# Patient Record
Sex: FEMALE | Race: WHITE | NOT HISPANIC OR LATINO | Employment: FULL TIME | URBAN - METROPOLITAN AREA
[De-identification: names, ages, dates, MRNs, and addresses within clinical notes are randomized per-mention and may not be internally consistent; named-entity substitution may affect disease eponyms.]

---

## 2020-10-27 ENCOUNTER — OFFICE VISIT (OUTPATIENT)
Dept: FAMILY MEDICINE | Facility: CLINIC | Age: 30
End: 2020-10-27
Payer: COMMERCIAL

## 2020-10-27 VITALS
TEMPERATURE: 99.2 F | SYSTOLIC BLOOD PRESSURE: 154 MMHG | DIASTOLIC BLOOD PRESSURE: 106 MMHG | RESPIRATION RATE: 20 BRPM | BODY MASS INDEX: 44.41 KG/M2 | OXYGEN SATURATION: 98 % | HEART RATE: 121 BPM | WEIGHT: 293 LBS | HEIGHT: 68 IN

## 2020-10-27 DIAGNOSIS — F64.9 GENDER DYSPHORIA: Primary | ICD-10-CM

## 2020-10-27 DIAGNOSIS — R03.0 ELEVATED BLOOD PRESSURE READING WITHOUT DIAGNOSIS OF HYPERTENSION: ICD-10-CM

## 2020-10-27 DIAGNOSIS — Z83.3 FAMILY HISTORY OF DIABETES MELLITUS: ICD-10-CM

## 2020-10-27 LAB
ALBUMIN SERPL-MCNC: 3.6 G/DL (ref 3.4–5)
ALP SERPL-CCNC: 57 U/L (ref 40–150)
ALT SERPL W P-5'-P-CCNC: 58 U/L (ref 0–70)
ANION GAP SERPL CALCULATED.3IONS-SCNC: 5 MMOL/L (ref 3–14)
AST SERPL W P-5'-P-CCNC: 32 U/L (ref 0–45)
BASOPHILS # BLD AUTO: 0.1 10E9/L (ref 0–0.2)
BASOPHILS NFR BLD AUTO: 0.8 %
BILIRUB SERPL-MCNC: 0.5 MG/DL (ref 0.2–1.3)
BUN SERPL-MCNC: 9 MG/DL (ref 7–30)
CALCIUM SERPL-MCNC: 9.3 MG/DL (ref 8.5–10.1)
CHLORIDE SERPL-SCNC: 98 MMOL/L (ref 94–109)
CHOLEST SERPL-MCNC: 226 MG/DL
CO2 SERPL-SCNC: 29 MMOL/L (ref 20–32)
CREAT SERPL-MCNC: 0.71 MG/DL (ref 0.66–1.25)
DIFFERENTIAL METHOD BLD: ABNORMAL
EOSINOPHIL # BLD AUTO: 0.1 10E9/L (ref 0–0.7)
EOSINOPHIL NFR BLD AUTO: 2 %
ERYTHROCYTE [DISTWIDTH] IN BLOOD BY AUTOMATED COUNT: 13.3 % (ref 10–15)
GFR SERPL CREATININE-BSD FRML MDRD: >90 ML/MIN/{1.73_M2}
GLUCOSE SERPL-MCNC: 356 MG/DL (ref 70–99)
HBA1C MFR BLD: 10.5 % (ref 0–5.6)
HCT VFR BLD AUTO: 50.3 % (ref 40–53)
HDLC SERPL-MCNC: 21 MG/DL
HGB BLD-MCNC: 16.8 G/DL (ref 13.3–17.7)
IMM GRANULOCYTES # BLD: 0 10E9/L (ref 0–0.4)
IMM GRANULOCYTES NFR BLD: 0.3 %
LDLC SERPL CALC-MCNC: ABNORMAL MG/DL
LDLC SERPL DIRECT ASSAY-MCNC: 66 MG/DL
LYMPHOCYTES # BLD AUTO: 2 10E9/L (ref 0.8–5.3)
LYMPHOCYTES NFR BLD AUTO: 30.6 %
MCH RBC QN AUTO: 27.5 PG (ref 26.5–33)
MCHC RBC AUTO-ENTMCNC: 33.4 G/DL (ref 31.5–36.5)
MCV RBC AUTO: 83 FL (ref 78–100)
MONOCYTES # BLD AUTO: 0.4 10E9/L (ref 0–1.3)
MONOCYTES NFR BLD AUTO: 5.7 %
NEUTROPHILS # BLD AUTO: 4 10E9/L (ref 1.6–8.3)
NEUTROPHILS NFR BLD AUTO: 60.6 %
NONHDLC SERPL-MCNC: 205 MG/DL
NRBC # BLD AUTO: 0 10*3/UL
NRBC BLD AUTO-RTO: 0 /100
PLATELET # BLD AUTO: 256 10E9/L (ref 150–450)
POTASSIUM SERPL-SCNC: 3.8 MMOL/L (ref 3.4–5.3)
PROT SERPL-MCNC: 8.3 G/DL (ref 6.8–8.8)
RBC # BLD AUTO: 6.1 10E12/L (ref 4.4–5.9)
SODIUM SERPL-SCNC: 131 MMOL/L (ref 133–144)
TRIGL SERPL-MCNC: 1750 MG/DL
WBC # BLD AUTO: 6.6 10E9/L (ref 4–11)

## 2020-10-27 PROCEDURE — 85025 COMPLETE CBC W/AUTO DIFF WBC: CPT | Performed by: FAMILY MEDICINE

## 2020-10-27 PROCEDURE — 36415 COLL VENOUS BLD VENIPUNCTURE: CPT | Performed by: FAMILY MEDICINE

## 2020-10-27 PROCEDURE — 83036 HEMOGLOBIN GLYCOSYLATED A1C: CPT | Performed by: FAMILY MEDICINE

## 2020-10-27 PROCEDURE — 80061 LIPID PANEL: CPT | Performed by: FAMILY MEDICINE

## 2020-10-27 PROCEDURE — 99204 OFFICE O/P NEW MOD 45 MIN: CPT | Performed by: FAMILY MEDICINE

## 2020-10-27 PROCEDURE — 83721 ASSAY OF BLOOD LIPOPROTEIN: CPT | Mod: 59 | Performed by: FAMILY MEDICINE

## 2020-10-27 PROCEDURE — 80053 COMPREHEN METABOLIC PANEL: CPT | Performed by: FAMILY MEDICINE

## 2020-10-27 ASSESSMENT — PATIENT HEALTH QUESTIONNAIRE - PHQ9
SUM OF ALL RESPONSES TO PHQ QUESTIONS 1-9: 15
5. POOR APPETITE OR OVEREATING: NEARLY EVERY DAY

## 2020-10-27 ASSESSMENT — ANXIETY QUESTIONNAIRES
6. BECOMING EASILY ANNOYED OR IRRITABLE: SEVERAL DAYS
IF YOU CHECKED OFF ANY PROBLEMS ON THIS QUESTIONNAIRE, HOW DIFFICULT HAVE THESE PROBLEMS MADE IT FOR YOU TO DO YOUR WORK, TAKE CARE OF THINGS AT HOME, OR GET ALONG WITH OTHER PEOPLE: SOMEWHAT DIFFICULT
5. BEING SO RESTLESS THAT IT IS HARD TO SIT STILL: SEVERAL DAYS
2. NOT BEING ABLE TO STOP OR CONTROL WORRYING: MORE THAN HALF THE DAYS
GAD7 TOTAL SCORE: 13
7. FEELING AFRAID AS IF SOMETHING AWFUL MIGHT HAPPEN: MORE THAN HALF THE DAYS
3. WORRYING TOO MUCH ABOUT DIFFERENT THINGS: MORE THAN HALF THE DAYS
1. FEELING NERVOUS, ANXIOUS, OR ON EDGE: MORE THAN HALF THE DAYS

## 2020-10-27 ASSESSMENT — MIFFLIN-ST. JEOR: SCORE: 2396.32

## 2020-10-27 NOTE — Clinical Note
Patient seeing you to discuss feminizing hormone therapy. BP high (she was super nervous so if still high next visit would recommend treating). I put in a referral for laser hair in case they do it at the  (she might need to call her insurance to see preferred location for laser hair and you might need to put in a referral for that specific place). She would benefit from a PCP so hopefully that can be you! She's got a lot of medical anxiety.  Best, Franca Sterling

## 2020-10-27 NOTE — PATIENT INSTRUCTIONS
After visit summary    1. Labs today  2. Review informed consent for feminizing hormone therapy  3. Schedule a 40 minute follow up to discuss informed consent and hormone types and dosing    Franca Saeed

## 2020-10-28 ASSESSMENT — ANXIETY QUESTIONNAIRES: GAD7 TOTAL SCORE: 13

## 2020-10-29 ENCOUNTER — OFFICE VISIT (OUTPATIENT)
Dept: PHARMACY | Facility: CLINIC | Age: 30
End: 2020-10-29
Payer: COMMERCIAL

## 2020-10-29 ENCOUNTER — OFFICE VISIT (OUTPATIENT)
Dept: FAMILY MEDICINE | Facility: CLINIC | Age: 30
End: 2020-10-29
Payer: COMMERCIAL

## 2020-10-29 VITALS
BODY MASS INDEX: 43.4 KG/M2 | WEIGHT: 293 LBS | HEART RATE: 108 BPM | TEMPERATURE: 99.1 F | DIASTOLIC BLOOD PRESSURE: 105 MMHG | HEIGHT: 69 IN | OXYGEN SATURATION: 96 % | SYSTOLIC BLOOD PRESSURE: 163 MMHG

## 2020-10-29 VITALS
SYSTOLIC BLOOD PRESSURE: 163 MMHG | TEMPERATURE: 99.1 F | HEART RATE: 108 BPM | WEIGHT: 293 LBS | HEIGHT: 69 IN | OXYGEN SATURATION: 96 % | DIASTOLIC BLOOD PRESSURE: 105 MMHG | BODY MASS INDEX: 43.4 KG/M2

## 2020-10-29 DIAGNOSIS — Z79.4 TYPE 2 DIABETES MELLITUS WITHOUT COMPLICATION, WITH LONG-TERM CURRENT USE OF INSULIN (H): Primary | ICD-10-CM

## 2020-10-29 DIAGNOSIS — E78.1 HYPERTRIGLYCERIDEMIA: ICD-10-CM

## 2020-10-29 DIAGNOSIS — Z83.3 FAMILY HISTORY OF DIABETES MELLITUS: Primary | ICD-10-CM

## 2020-10-29 DIAGNOSIS — Z23 NEED FOR PROPHYLACTIC VACCINATION AND INOCULATION AGAINST INFLUENZA: ICD-10-CM

## 2020-10-29 DIAGNOSIS — E11.9 TYPE 2 DIABETES MELLITUS WITHOUT COMPLICATION, WITH LONG-TERM CURRENT USE OF INSULIN (H): Primary | ICD-10-CM

## 2020-10-29 DIAGNOSIS — R03.0 ELEVATED BLOOD PRESSURE READING WITHOUT DIAGNOSIS OF HYPERTENSION: ICD-10-CM

## 2020-10-29 LAB
CHOLEST SERPL-MCNC: 211.7 MG/DL (ref 0–200)
CHOLEST/HDLC SERPL: 8.2 {RATIO} (ref 0–5)
HDLC SERPL-MCNC: 25.8 MG/DL
LDLC SERPL CALC-MCNC: ABNORMAL MG/DL (ref 0–129)
TRIGL SERPL-MCNC: 1084.5 MG/DL (ref 0–150)
VLDL CHOLESTEROL: 216.9 MG/DL (ref 7–32)

## 2020-10-29 PROCEDURE — 80061 LIPID PANEL: CPT | Performed by: FAMILY MEDICINE

## 2020-10-29 PROCEDURE — 99214 OFFICE O/P EST MOD 30 MIN: CPT | Mod: 25 | Performed by: FAMILY MEDICINE

## 2020-10-29 PROCEDURE — 82043 UR ALBUMIN QUANTITATIVE: CPT | Performed by: FAMILY MEDICINE

## 2020-10-29 PROCEDURE — 90686 IIV4 VACC NO PRSV 0.5 ML IM: CPT | Performed by: FAMILY MEDICINE

## 2020-10-29 PROCEDURE — 90471 IMMUNIZATION ADMIN: CPT | Performed by: FAMILY MEDICINE

## 2020-10-29 PROCEDURE — 99605 MTMS BY PHARM NP 15 MIN: CPT | Performed by: PHARMACIST

## 2020-10-29 PROCEDURE — 36415 COLL VENOUS BLD VENIPUNCTURE: CPT | Performed by: FAMILY MEDICINE

## 2020-10-29 RX ORDER — IBUPROFEN 200 MG
600 TABLET ORAL EVERY 4 HOURS PRN
COMMUNITY
End: 2020-11-26

## 2020-10-29 RX ORDER — METFORMIN HCL 500 MG
500 TABLET, EXTENDED RELEASE 24 HR ORAL 2 TIMES DAILY WITH MEALS
Qty: 360 TABLET | Refills: 1 | Status: SHIPPED | OUTPATIENT
Start: 2020-10-29 | End: 2020-11-27

## 2020-10-29 ASSESSMENT — MIFFLIN-ST. JEOR
SCORE: 2403.02
SCORE: 2403.02

## 2020-10-29 NOTE — Clinical Note
I will plan to call Ghazala next week to check in with initiation of these changes to start managing diabetes and get blood pressure readings. Thank you for the opportunity to collaborate on the care of this patient!  Barb Ortiz, Pharm D.

## 2020-10-29 NOTE — PROGRESS NOTES
MTM ENCOUNTER  SUBJECTIVE/OBJECTIVE:                           Osman Castellano is a 30 year old adult coming in for an initial visit. Ghazala Castellano was referred to me from Dr. Sterling. Patient is seeing provider after our visit today.     Chief Complaint: New diabetes diagnosis.    Allergies/ADRs: Reviewed in chart  Tobacco: Ghazala Castellano reports that Ghazala Castellano has never smoked. Ghazala Castellano has never used smokeless tobacco.  Alcohol: Less than 1 beverage / month  Caffeine: 3 cups/day of coffee; has stopped soda almost entirely due to sugar content. Did have one on Tuesday and notices an increase around holidays.  Activity: making recent effort so start increasing activity; difficult with pandemic - start with physical video games on Switch this week. Her personal goal is 15-30 mins every day.      Medication Adherence/Access: Ghazala is not currently taking any chronic medications. Only ibuprofen 200mg 3 tablets prn for pain up to 3 times daily in frequently for aches and pains    Diabetes: Ghazala is feeling a bit overwhelmed about new diabetes diagnosis but is motivated to control blood sugars and improve health. She has concerns about her new diagnosis impacting her desire to initiate hormone replacement therapy soon. Her mom has diabetes (takes metformin and tests blood sugars) and there is a family history of T2DM which she is aware of starting later in life. Ghazala has made some lifestyle changes and is aware of how her body reacts to sugar. She has some concerns about insulin and it causing her blood sugars to increase.    Hypertension: Ghazala is not currently testing her blood pressure at home. She has noticed that she has had high blood pressures in the past.    Hypertriglyceridemia: Ghazala is fasting today and is amenable to getting labs today. She believes her mother also takes medication for cholesterol management.      Today's Vitals: BP (!) 163/105   " Pulse 108   Temp 99.1  F (37.3  C) (Oral)   Ht 5' 8.5\" (1.74 m)   Wt 322 lb (146.1 kg)   SpO2 96%   BMI 48.25 kg/m      The ASCVD Risk score (Elinor EUBANKS Jr., et al., 2013) failed to calculate for the following reasons:    The 2013 ASCVD risk score is only valid for ages 40 to 79    ASSESSMENT:                              Medication Adherence: No issues identified    Diabetes: Not at goal - needs additional therapy  Ghazala's A1C of 10.5% is not at goal of <7%.  Metformin 2000mg daily is recommended for initial therapy. It is reasonable to titrate metformin for GI tolerability. This is her first visit regarding new DM diagnosis and her questions/concerns were addressed including education on insulin decreasing blood sugar, not increasing it.     Since A1C>10%, insulin is recommended. A starting basal insulin dose of 0.2 units/kg would be 29 units daily. Since Ghazala has concerns for hypoglycemia, it is reasonable to start at 10 units today with close monitoring and titration up to 29 units. It was explained that long acting insulin would be where therapy would be started to help get blood sugars controlled more efficiently but has slow onset of action.  Ghazala was educated on use of insulin and demonstrated this in clinic.     Additionally, Ghazala was educated on self monitored blood glucose and recognizing and treating low blood sugars. She is willing to start testing blood sugars daily.     Ghazala has never had a flu shot and was educated on the importance of a flu shot for people with diabetes. PPSV23 and Hep B vaccines should also be considered in the future. She was educated on daily foot exam and discussed annual foot, eye, and dental exams in the future. She is interested in a referral to nutrition/diabetes education. There is no expected complications with HRT and diabetes management.       Hypertension: Not at goal; needs additional monitoring  Ghazala is not currently testing blood pressure at home. It would " be helpful to know if blood pressure readings are elevated at home to rule out white coat hypertension. She was supplied a home blood pressure cuff today and educated on how to use it daily with a log sheet to record. Urine albumin test was recommended today to evaluate microalbuminuria and assess if ACE/ARB appropriate for kidney protection and blood pressure management.    Hypertrigliceridemia: Not at goal; needs additional therapy  She was fasting when she came to clinic so fasting lipid panel was ordered. Triglycerides were elevated to 1084 mg/dL; readings above 885 mg/dL are  Recommended to be treated with fenofibrate initially to manage elevated triglycerides to prevent pancreatitis. Lifestyle management including diet and exercise and controlling blood glucose will also decrease triglycerides. Uncontrolled diabetes also contributes to elevated triglycerides and may infer insulin resistance.  Since Ghazala is less than 40, ASCVD risk is unable to be calculated. Oral estrogens may also increase triglycerides which may need to be evaluated as Ghazala considers HRT.      PLAN:                            To patient:  1) Start metformin ER 500mg BID with plan to taper to 2000 mg daily  2) Start testing blood sugars at least once daily in the morning  3) Start monitoring blood pressure at home  4) Get flu shot today    To provider:  1) recommend fasting lipid panel and urine albumin   2) recommend start basal insulin 10 units with plan to taper to 29 units daily  3) consider fenofibrate in the future to manage triglycerides  4) consider ACE/ARB for blood pressure management, pending urine albumin results in the future      I spent 45 minutes with this patient today. All changes were made via collaborative practice agreement with Dr. RENO Santillan. A copy of the visit note was provided to the patient's referring provider.    Will follow up in 1 week via phone.    The patient was given a summary of these recommendations. See  Provider note/AVS from today.     Barb Ortiz, Pharm D.

## 2020-10-29 NOTE — PATIENT INSTRUCTIONS
Recommendations from today's MTM visit:                                                    Today we reviewed what your medicines are for, how to know if they are working, that your medicines are safe and how to make your medicine regimen as easy as possible.      To prepare to take your blood pressure:  1. Do not consume any caffeinated beverages (tea, coffee, soda), do not smoke, do not exercise for 30 minutes before measuring your blood pressure.  2. Sit quietly for at least 5 minutes before starting to measure your blood pressure.    To measure your blood pressure:  1. Sit with both feet flat on the floor. Do not stand, do not lie down.  2. Place the cuff on your arm above your elbow so that your elbow can bend comfortably.  3. Pull the cuff tight enough to stay in place and allow for your fingers to fit between your arm and the cuff.  4. Position the cuff so that the cord lies down the inside of your arm.  5. Do not talk while you are using the machine.  6. Press the START button. It will squeeze your arm and then release slowly.   7. When you see the numbers on the screen, you are done.   8. Write the numbers down and the time of day so that you can track what they are.        It was great to speak with you today.  I value your experience and would be very thankful for your time with providing feedback on our clinic survey. You may receive a survey via email or text message in the next few days.     Next MTM visit: in 1-2 weeks    To schedule another MTM appointment, please call the clinic directly or you may call the MTM scheduling line at [phone] or toll-free at [phone].     My Clinical Pharmacist's contact information:                                                      It was a pleasure talking with you today!  Please feel free to contact me with any questions or concerns you have.      Barb Ortiz, Pharm D.

## 2020-10-29 NOTE — PROGRESS NOTES
Ghazala Peña is a 30 year old  who presents for   Chief Complaint   Patient presents with     Diabetes     questions how HRT would interact with new DX of diabetes     Imm/Inj     Flu Shot       Assessment and Plan      Ghazala was seen today for diabetes and imm/inj.    Diagnoses and all orders for this visit:    Type 2 diabetes mellitus without complication, with long-term current use of insulin (H)  Plan to start metformin and lantus today.   Teaching done via Pharm D.   Referred to nutrition for further discussion on diet recommendations.   Plan 1 week check in with PharmD via phone visit. 2 week visit with provider.     -     blood glucose monitoring (NO BRAND SPECIFIED) meter device kit; Use to test blood sugar 2 times daily or as directed.  -     blood glucose (NO BRAND SPECIFIED) lancets standard; Use to test blood sugar 2 times daily or as directed.  -     blood glucose (NO BRAND SPECIFIED) test strip; Use to test blood sugar 2 times daily or as directed.  -     metFORMIN (GLUCOPHAGE-XR) 500 MG 24 hr tablet; Take 1 tablet (500 mg) by mouth 2 times daily (with meals)  -     Lipid [place] ([place])  -     Albumin Random Urine Quantitative with Creat Ratio  -     insulin glargine (LANTUS PEN) 100 UNIT/ML pen; Inject 10 Units Subcutaneous At Bedtime  -     insulin pen needle (32G X 6 MM) 32G X 6 MM miscellaneous; Use 1 pen needles daily or as directed.  -     NUTRITION REFERRAL - INTERNAL    Elevated blood pressure reading without diagnosis of hypertension  Blood pressure elevated today. May have history of white coat hypertension. Patient is going to monitor BP at home, will bring log to next appointment. May need anti-hypertensive.     Hypertriglyceridemia  Labs returned after appointment. Fasting triglycerides remain elevated. Discussed with pharm D, consider statin therapy (although patient is below age range for ASCVD recommendations) vs. fibrate to help lower triglycerides.     Need for prophylactic  "vaccination and inoculation against influenza  -     INFLUENZA VACCINE IM > 6 MONTHS VALENT IIV4 [10525]    I did reassure patient that a new diagnosis of diabetes would not prevent her from starting HRT. We briefly discussed concerns about starting estrogen in the setting of uncontrolled blood pressure, we will see how her blood pressure is doing on home checks before deciding next steps.     Return in about 12 days (around 11/10/2020).    Options for treatment and follow-up care were reviewed with the patient. Ghazala Peña engaged in the decision making process and verbalized understanding of the options discussed and agreed with the final plan.    Vani Fletcher, DO         HPI       Ghazala Peña is a 30 year old  who presents for   Patient presents with:  Diabetes: questions how HRT would interact with new DX of diabetes  Imm/Inj: Flu Shot      HPI:     Patient presents today with new diagnosis of diabetes. [facility] seen at [facility], had routine screening labs done. A1c noted to be 10.5.     Lab Results   Component Value Date    A1C 10.5 10/27/2020     Patient does not have a history of diabetes.   Working on diet - tracking macros. Overall finds she feels healthier when eating this way.   Has been told in the past that she has high blood pressure, notes she is often anxious when going to the doctor.   Met with pharmacy already today - they reviewed medications, options and glucometer.     Patient is an established patient of this clinic..    Reviewed and updated as needed this visit by Provider  Tobacco  Allergies  Meds   Med Hx  Surg Hx  Fam Hx  Soc Hx             Review of Systems:   Review of Systems  All ROS was negative except those noted in HPI       Physical Exam:     Vitals:    10/29/20 0828 10/29/20 0829   BP: (!) 160/103 (!) 163/105   Pulse: 108    Temp: 99.1  F (37.3  C)    TempSrc: Oral    SpO2: 96%    Weight: 146.1 kg (322 lb)    Height: 1.74 m (5' 8.5\")      Body mass index is 48.25 " kg/m .  Vitals were reviewed and were normal  Physical Exam    Results:      Results from this visit  Results for orders placed or performed in visit on 10/29/20   Lipid [place] ([facility])     Status: Abnormal   Result Value Ref Range    Cholesterol 211.7 (H) 0.0 - 200.0 mg/dL    Cholesterol/HDL Ratio 8.2 (H) 0.0 - 5.0    HDL Cholesterol 25.8 (L) >40.0 mg/dL    Triglycerides 1,084.5 (H) 0.0 - 150.0 mg/dL    VLDL Cholesterol 216.9 (H) 7.0 - 32.0 mg/dL    LDL Cholesterol Calculated Unable to calculate Trig >=400 0 - 129 mg/dL    Narrative    Triglycerides were >400 mg/dL, unable to calculate the LDL       DO ANGELO Garcia [facility]

## 2020-10-30 LAB
CREAT UR-MCNC: 201 MG/DL
MICROALBUMIN UR-MCNC: 1360 MG/L
MICROALBUMIN/CREAT UR: 676.62 MG/G CR (ref 0–17)

## 2020-11-03 NOTE — PROGRESS NOTES
I have verified the content of the note, which accurately reflects my assessment of the patient and the plan of care.   Thomas Guerra, [facility], PharmD

## 2020-11-05 ENCOUNTER — TELEPHONE (OUTPATIENT)
Dept: FAMILY MEDICINE | Facility: CLINIC | Age: 30
End: 2020-11-05

## 2020-11-05 NOTE — TELEPHONE ENCOUNTER
Patient called requesting to have a call back from PharmD regarding what their next steps should be. Please call back to discuss, okay to LVM.    Patrizia Quezada, Senior Patient Representative/

## 2020-11-09 NOTE — TELEPHONE ENCOUNTER
PHARMACY TELEPHONE ENCOUNTER:    Reason: DM follow up       I called the patient back. She has recently started insulin and continues to take 10 units daily. She has been monitoring BS in the AM.  Reports BS have been dropping. Has not experienced any adverse effects.  Reports BS this  mg/dl.  Today we reviewed BS goals in the AM and post meals. We reviewed save BS values.   Ghazala will continue to monitor her blood sugar.  Planned to visit with Dr. Meredith later this week.    She has been increasing her exercise and controlling her diet with has also contributed to improve her BS.   I have congratulated her on these changes and encouraged her to discuss this further with Dr. Meredith.    No changes made on DM medications today.      Thomas Guerra, Pharm.D.

## 2020-11-11 ENCOUNTER — OFFICE VISIT (OUTPATIENT)
Dept: FAMILY MEDICINE | Facility: CLINIC | Age: 30
End: 2020-11-11
Payer: COMMERCIAL

## 2020-11-11 VITALS
SYSTOLIC BLOOD PRESSURE: 176 MMHG | BODY MASS INDEX: 47.05 KG/M2 | DIASTOLIC BLOOD PRESSURE: 108 MMHG | WEIGHT: 293 LBS | TEMPERATURE: 98.5 F | HEART RATE: 95 BPM | RESPIRATION RATE: 16 BRPM

## 2020-11-11 DIAGNOSIS — R80.9 TYPE 2 DIABETES MELLITUS WITH MICROALBUMINURIA, WITH LONG-TERM CURRENT USE OF INSULIN (H): ICD-10-CM

## 2020-11-11 DIAGNOSIS — F64.9 GENDER DYSPHORIA: ICD-10-CM

## 2020-11-11 DIAGNOSIS — E11.29 TYPE 2 DIABETES MELLITUS WITH MICROALBUMINURIA, WITH LONG-TERM CURRENT USE OF INSULIN (H): ICD-10-CM

## 2020-11-11 DIAGNOSIS — I10 ESSENTIAL HYPERTENSION: Primary | ICD-10-CM

## 2020-11-11 DIAGNOSIS — Z79.4 TYPE 2 DIABETES MELLITUS WITH MICROALBUMINURIA, WITH LONG-TERM CURRENT USE OF INSULIN (H): ICD-10-CM

## 2020-11-11 DIAGNOSIS — E78.1 HYPERTRIGLYCERIDEMIA: ICD-10-CM

## 2020-11-11 PROCEDURE — 99214 OFFICE O/P EST MOD 30 MIN: CPT | Mod: GC | Performed by: STUDENT IN AN ORGANIZED HEALTH CARE EDUCATION/TRAINING PROGRAM

## 2020-11-11 RX ORDER — LISINOPRIL 2.5 MG/1
2.5 TABLET ORAL DAILY
Qty: 60 TABLET | Refills: 0 | Status: SHIPPED | OUTPATIENT
Start: 2020-11-11 | End: 2020-11-27

## 2020-11-11 RX ORDER — FENOFIBRATE 200 MG/1
200 CAPSULE ORAL
Qty: 60 CAPSULE | Refills: 0 | Status: SHIPPED | OUTPATIENT
Start: 2020-11-11 | End: 2021-01-04

## 2020-11-11 NOTE — PROGRESS NOTES
Clinical Pharmacy Consult:                                                    Ghazala Castellano is a 30 year old adult coming in for a clinical pharmacist consult.  Ghazala Castellano was referred to me from Dr. Meredith.    Reason for Consult: New insulin dose    Lantus 10 units at 9PM at night.    No new adverse effects.  Has been able to improve blood sugars and blood pressure with changes to diet and exercise.      Record of home blood sugars      Date Fasting AM Before lunch After lunch Before Dinner After Dinner Before Bedtime Late night   11/11 124         11/10 114         11/09 119         11/08 122         11/07 124         11/06 127         11/05 141         11/04 165         11/03 159           Lab Results   Component Value Date    A1C 10.5 10/27/2020       Home blood pressures:  129/90  129/100  129/89  132/89  126/87  133/95  130/95  139/94  143/91  145/97    Ghazala has been using the Tr[employer] jose:  Has been using this to track lifestyle changes.      Improving diet    Decreasing caffeine intake.      Monitoring weight a few times a week    Feels that this has brought an objective perspective to her care.      Discussion:   HTN: improved, but remain above goal of 130/80 mg/dl. Agree with Dr. Meredith's assessment to initiate Lisinopril today to address proteinuria and elevated blood pressure.    DM: Improved values.  Patient has been tolerating insulin start and morning blood sugars continue to trend downwards.  May consider either continuing on the same dose or consider decreasing the dose       Plan:  1. Agree with addition of ACE inhibitor   2. Continue to monitor Blood sugars     Thomas Guerra Pharm.D.       .

## 2020-11-11 NOTE — PROGRESS NOTES
"       SULY Vivar is a 30 year old who presents for   Chief Complaint   Patient presents with     RECHECK     DM follow up: Checking glucose at home and Blood pressure at home. Patient brought a log book of the readings     RECHECK     HRT: Patient would like to discus side effects of starting a hormone,\"estrogen\".     Chest Pain     Upper right side of chest has some achy pain.     Initial HRT visit was on 10/27/20  fouind to have diabetes on initial screenings  Follow up visit 10/29/20 -  elevated triglycerides. pharmD discussed then to consider statin vs fibrate  started metformin 500 mg bid and 10 U lantus then.   teaching done with pharmd.    No chest pain   No shortness of breath  No RUQ pain  No vision changes     Diabetes Follow-up  <7.0  Patient is checking blood sugars: once to twice daily.  Results are as follows:    Check pharmD note.          -Last A1C was   Lab Results   Component Value Date    A1C 10.5 10/27/2020        Diabetic concerns: None    Chest Pain or exercise related calf pain (claudication):no     Symptoms of hypoglycemia (low blood sugar): none     Paresthesias (numbness or burning in feet) or sores: No     Diabetic eye exam within the last year?: not yet. Just diagnosed.     We discussed elevated triglycerides >1000 and risk of pancreatitis     Hypertension Follow-up     <130/80    Outpatient blood pressures are being checked at home. See PharmD note. diastolics as high as 100.     Low Salt Diet: no added salt    Daily NSAID Use?no   Last Basic Metabolic Panel:  Lab Results   Component Value Date     10/27/2020      Lab Results   Component Value Date    POTASSIUM 3.8 10/27/2020     Lab Results   Component Value Date    CHLORIDE 98 10/27/2020     Lab Results   Component Value Date    DIAMOND 9.3 10/27/2020     Lab Results   Component Value Date    CO2 29 10/27/2020     Lab Results   Component Value Date    BUN 9 10/27/2020     Lab Results   Component Value Date    CR 0.71 10/27/2020 "     Lab Results   Component Value Date     10/27/2020     Problem, Medication and Allergy Lists were reviewed and updated if needed..    Patient is an established patient of this clinic.  Past Medical History:   Diagnosis Date     Anxiety      Depressive disorder      Environmental allergies     flare-up in fall          Review of Systems:   Review of Systems  See hpi        Physical Exam:     Vitals:    11/11/20 1522 11/11/20 1525   BP: (!) 165/102 (!) 176/108   Pulse: 95    Resp: 16    Temp: 98.5  F (36.9  C)    TempSrc: Oral    Weight: 142.4 kg (314 lb)      Body mass index is 47.05 kg/m .  Vital signs normal except hypertension      Physical Exam  Constitutional:       General: Ghazala Castellano is not in acute distress.     Appearance: Ghazala Castellano is obese. Ghazala Castellano is not ill-appearing, toxic-appearing or diaphoretic.   Eyes:      General: No scleral icterus.     Conjunctiva/sclera: Conjunctivae normal.   Cardiovascular:      Rate and Rhythm: Normal rate and regular rhythm.      Pulses: Normal pulses.      Heart sounds: Normal heart sounds. No murmur.   Pulmonary:      Effort: Pulmonary effort is normal. No respiratory distress.      Breath sounds: No wheezing or rhonchi.   Skin:     General: Skin is warm and dry.      Capillary Refill: Capillary refill takes less than 2 seconds.   Neurological:      Mental Status: Ghazala Castellano is alert.   Psychiatric:         Mood and Affect: Mood normal.         Behavior: Behavior normal.         Thought Content: Thought content normal.         Judgment: Judgment normal.         Results:   Results from last visit:  Office Visit on 10/29/2020   Component Date Value Ref Range Status     Cholesterol 10/29/2020 211.7* 0.0 - 200.0 mg/dL Final     Cholesterol/HDL Ratio 10/29/2020 8.2* 0.0 - 5.0 Final     HDL Cholesterol 10/29/2020 25.8* >40.0 mg/dL Final     Triglycerides 10/29/2020 1,084.5* 0.0 - 150.0 mg/dL Final      VLDL Cholesterol 10/29/2020 216.9* 7.0 - 32.0 mg/dL Final     LDL Cholesterol Calculated 10/29/2020 Unable to calculate Trig >=400  0 - 129 mg/dL Final     Creatinine Urine 10/29/2020 201  mg/dL Final     Albumin Urine mg/L 10/29/2020 1,360  mg/L Final     Albumin Urine mg/g Cr 10/29/2020 676.62* 0 - 17 mg/g Cr Final     Assessment and Plan      Ghazala was seen today for DM, hypertriglyceride and hypertension check.     Diagnoses and all orders for this visit:    Essential hypertension  Made diagnosis of hypertension with high clinic BPs and with high diastolic BPs at home.   Start 2.5 mg lisinopril today. Also renal protective.   BMP in 2 weeks.   -     lisinopril (ZESTRIL) 2.5 MG tablet; Take 1 tablet (2.5 mg) by mouth daily    Hypertriglyceridemia  Decision made today to start fenofibrate 200 mg with pharmD recommendation. Discussed common side effects and efficacy to reduce triglycerides up to 70%.   Needed to start fibrate also to decrease possible risk of pancreatitis with triglycerides now over 1,000.   Whenever we start statin in the future, will be important to reeval triglyceride level and likely stop fibrate then.   -     fenofibrate micronized (LOFIBRA) 200 MG capsule; Take 1 capsule (200 mg) by mouth every morning (before breakfast)    Type 2 diabetes mellitus with microalbuminuria, with long-term current use of insulin (H)  Started lisinopril today. See above. Albuminuria present.   Continue lantus and metformin without change today.   Blood glucose mostly in range.    nutrition needed - discuss at next visit   phone visit in 1-2 weeks with pharmd    Follow up with me or Dr. Collado in 2 weeks.      There are no discontinued medications.    Options for treatment and follow-up care were reviewed with the patient. Osman Castellano  engaged in the decision making process and verbalized understanding of the options discussed and agreed with the final plan.    Damian Meredith MD

## 2020-11-11 NOTE — PATIENT INSTRUCTIONS
Here is the plan from today's visit    1. Essential hypertension  Start 2.5 mg lisinopril   - lisinopril (ZESTRIL) 2.5 MG tablet; Take 1 tablet (2.5 mg) by mouth daily  Dispense: 60 tablet; Refill: 0    2. Hypertriglyceridemia  Start fenofibrate for high triglycerides   Keep up the hard work   - fenofibrate micronized (LOFIBRA) 200 MG capsule; Take 1 capsule (200 mg) by mouth every morning (before breakfast)  Dispense: 60 capsule; Refill: 0    No changes to diabetes management or meds     In person visit in 2 weeks to check labs     Please call or return to clinic if your symptoms don't go away.    Thank you for coming to [facility] today.  Lab Testing:  **If you had lab testing today and your results are reassuring or normal they will be mailed to you or sent through [employer] within 7 days.   **If the lab tests need quick action we will call you with the results.  The phone number we will call with results is # [phone] (home) . If this is not the best number please call our clinic and change the number.  Medication Refills:  If you need any refills please call your pharmacy and they will contact us.   If you need to  your refill at a new pharmacy, please contact the new pharmacy directly. The new pharmacy will help you get your medications transferred faster.   Scheduling:  If you have any concerns about today's visit or wish to schedule another appointment please call our office during normal business hours [phone] (8-5:00 M-F)  If a referral was made to a [facility] and you don't get a call from central scheduling please call [phone].  If a Mammogram was ordered for you at [facility] call [phone] to schedule or change your appointment.  If you had an XRay/CT/Ultrasound/MRI ordered the number is [phone] to schedule or change your radiology appointment.   Medical Concerns:  If you have urgent medical concerns please call [phone] at any time of  the day.    Damian Meredith MD

## 2020-11-13 PROBLEM — E78.1 HYPERTRIGLYCERIDEMIA: Status: ACTIVE | Noted: 2020-11-13

## 2020-11-13 PROBLEM — I10 ESSENTIAL HYPERTENSION: Status: ACTIVE | Noted: 2020-11-13

## 2020-11-26 RX ORDER — PEN NEEDLE, DIABETIC 32GX 5/32"
1 NEEDLE, DISPOSABLE MISCELLANEOUS 4 TIMES DAILY PRN
COMMUNITY
Start: 2020-10-30 | End: 2021-01-04

## 2020-11-27 ENCOUNTER — OFFICE VISIT (OUTPATIENT)
Dept: FAMILY MEDICINE | Facility: CLINIC | Age: 30
End: 2020-11-27
Payer: COMMERCIAL

## 2020-11-27 VITALS
DIASTOLIC BLOOD PRESSURE: 88 MMHG | HEART RATE: 110 BPM | BODY MASS INDEX: 46.6 KG/M2 | TEMPERATURE: 99.4 F | WEIGHT: 293 LBS | OXYGEN SATURATION: 98 % | SYSTOLIC BLOOD PRESSURE: 156 MMHG

## 2020-11-27 DIAGNOSIS — F64.9 GENDER DYSPHORIA: ICD-10-CM

## 2020-11-27 DIAGNOSIS — R80.9 TYPE 2 DIABETES MELLITUS WITH MICROALBUMINURIA, WITH LONG-TERM CURRENT USE OF INSULIN (H): Primary | ICD-10-CM

## 2020-11-27 DIAGNOSIS — Z79.4 TYPE 2 DIABETES MELLITUS WITH MICROALBUMINURIA, WITH LONG-TERM CURRENT USE OF INSULIN (H): Primary | ICD-10-CM

## 2020-11-27 DIAGNOSIS — E78.1 HYPERTRIGLYCERIDEMIA: ICD-10-CM

## 2020-11-27 DIAGNOSIS — E11.29 TYPE 2 DIABETES MELLITUS WITH MICROALBUMINURIA, WITH LONG-TERM CURRENT USE OF INSULIN (H): Primary | ICD-10-CM

## 2020-11-27 DIAGNOSIS — I10 ESSENTIAL HYPERTENSION: ICD-10-CM

## 2020-11-27 LAB
BUN SERPL-MCNC: 10.9 MG/DL (ref 7–21)
CALCIUM SERPL-MCNC: 9.9 MG/DL (ref 8.5–10.1)
CHLORIDE SERPLBLD-SCNC: 105.1 MMOL/L (ref 98–110)
CHOLEST SERPL-MCNC: 170.9 MG/DL (ref 0–200)
CHOLEST/HDLC SERPL: 5.5 {RATIO} (ref 0–5)
CO2 SERPL-SCNC: 27.2 MMOL/L (ref 20–32)
CREAT SERPL-MCNC: 0.9 MG/DL (ref 0.7–1.3)
GFR SERPL CREATININE-BSD FRML MDRD: >90 ML/MIN/1.7 M2
GLUCOSE SERPL-MCNC: 149.6 MG'DL (ref 70–99)
HDLC SERPL-MCNC: 31.2 MG/DL
LDLC SERPL CALC-MCNC: 67 MG/DL (ref 0–129)
POTASSIUM SERPL-SCNC: 3.8 MMOL/L (ref 3.3–4.5)
SODIUM SERPL-SCNC: 138.9 MMOL/L (ref 132.6–141.4)
TRIGL SERPL-MCNC: 362.7 MG/DL (ref 0–150)
VLDL CHOLESTEROL: 72.5 MG/DL (ref 7–32)

## 2020-11-27 PROCEDURE — 90715 TDAP VACCINE 7 YRS/> IM: CPT | Performed by: FAMILY MEDICINE

## 2020-11-27 PROCEDURE — 90732 PPSV23 VACC 2 YRS+ SUBQ/IM: CPT | Performed by: FAMILY MEDICINE

## 2020-11-27 PROCEDURE — 90471 IMMUNIZATION ADMIN: CPT | Performed by: FAMILY MEDICINE

## 2020-11-27 PROCEDURE — 90472 IMMUNIZATION ADMIN EACH ADD: CPT | Performed by: FAMILY MEDICINE

## 2020-11-27 PROCEDURE — 99214 OFFICE O/P EST MOD 30 MIN: CPT | Mod: 25 | Performed by: FAMILY MEDICINE

## 2020-11-27 PROCEDURE — 36415 COLL VENOUS BLD VENIPUNCTURE: CPT | Performed by: FAMILY MEDICINE

## 2020-11-27 PROCEDURE — 90746 HEPB VACCINE 3 DOSE ADULT IM: CPT | Performed by: FAMILY MEDICINE

## 2020-11-27 PROCEDURE — 80048 BASIC METABOLIC PNL TOTAL CA: CPT | Performed by: FAMILY MEDICINE

## 2020-11-27 PROCEDURE — 80061 LIPID PANEL: CPT | Performed by: FAMILY MEDICINE

## 2020-11-27 RX ORDER — BLOOD-GLUCOSE METER
1 EACH MISCELLANEOUS 2 TIMES DAILY
COMMUNITY
Start: 2020-10-29

## 2020-11-27 RX ORDER — LISINOPRIL 5 MG/1
5 TABLET ORAL DAILY
Qty: 90 TABLET | Refills: 3 | Status: SHIPPED | OUTPATIENT
Start: 2020-11-27 | End: 2021-03-08

## 2020-11-27 RX ORDER — LANCETS
1 EACH MISCELLANEOUS 2 TIMES DAILY
COMMUNITY
Start: 2020-11-26 | End: 2024-03-11

## 2020-11-27 RX ORDER — METFORMIN HYDROCHLORIDE EXTENDED-RELEASE TABLETS 1000 MG/1
1000 TABLET, FILM COATED, EXTENDED RELEASE ORAL 2 TIMES DAILY WITH MEALS
Qty: 180 TABLET | Refills: 3 | Status: SHIPPED | OUTPATIENT
Start: 2020-11-27 | End: 2020-12-02

## 2020-11-27 NOTE — PATIENT INSTRUCTIONS
I recommend you to get a cholesterol re check to evaluate the triglyceride levels. To rule out any possible further complications such as pancreatitis for any elevated triglyceride levels.    Keep your lantax 10 units at night.     Increase the metformin and increase the lisinopril to protect your kidneys.     If you feel dizzy, light headed, blurry vision, shaky, if the blood pressure falls to low, Have some juice, piece of toast, or candy.  Call if symptoms do not resolve.     For estrogen therapy goal is to reduce A1C levels to 7.     Return for with kassidy Meredith 1 month, in person, for hormones.

## 2020-11-27 NOTE — PROGRESS NOTES
HPI       Osman Castellano is a 30 year old  who presents for   Chief Complaint   Patient presents with     RECHECK     HRT and Blood pressure check       RECHECK:She reports that she has been keeping a track of her blood glucose levels, blood pressure, it was almost in 140's about a month ago but is currently around 120/91. Sugars went from 200's to . She reports that she has an experience of tracking blood sugar, pressure, protein and carbohydrates in take. She reports that she has pens of insulin. Endorses using 500 mg tablet of metformin. Reports mild diarrhea from the medication. Endorses softer stool when first started the metformin medication. She reports taking the metformin once in the AM/PM.    LISINOPRIL: He reports experiencing dry cough from lisinopril upon initial intake of medication but endorses reduction in cough lately. Denies any severe side effects from lisinopril. Recalls a history of type 11 diabetes mellitus in the mother. Upon questioning about any kidney problems in the family history she replies that she is not aware of any. She also reports taking fenofibrate.     THERAPY: She reports that she has been seeing a therapist since over one year now and endorses a good connection with her therapist. She endorses severe anxiety with medication intake and treatment adherence. She reports working on her anxiety with her therapist.     A1C levels and Estrogen therapy: Upon questioning about eating anything today she replies she has not eaten anything as if yet. I recommend you to get a cholesterol re check to evaluate the triglyceride levels. To rule out any possible further complications such as pancreatitis for any elevated triglyceride levels. Keep your lantax 10 units at night. She is willing to undergo estrogen therapy going forward and is concerned about the care. She likes the idea of breast development, less hair.  She reports psychological trigger such as pulling hair on  her body if she does not shave often. She endorses speaking to dermatologist about a permanent hair removal. She is willing to undergo face advancement and hair line adjustment as a part of her feminization process.     PREVENTIVE HEALTH: Upon questioning about interest in flu shots today as a part of her preventive health. She reports that she is willing to receive one. She is also willing to repeat tdap.     Hypertension Follow-up  <140/90    Outpatient blood pressures are being checked at home.  Results are consistently 130's/80-91.    Chest Pain? :No     Low Salt Diet: starting to watch diet    Daily NSAID Use?No     Did patient take their HTN pills today/last night as usual?  Yes    Last Basic Metabolic Panel:  Lab Results   Component Value Date     10/27/2020      Lab Results   Component Value Date    POTASSIUM 3.8 10/27/2020     Lab Results   Component Value Date    CHLORIDE 98 10/27/2020     Lab Results   Component Value Date    DIAMOND 9.3 10/27/2020     Lab Results   Component Value Date    CO2 29 10/27/2020     Lab Results   Component Value Date    BUN 9 10/27/2020     Lab Results   Component Value Date    CR 0.71 10/27/2020     Lab Results   Component Value Date     10/27/2020       Adherence and Exercise  Medication side effects: yes: mild diarrhea from metformin and mild cough from lisinopril.   How often is a medication missed? Never  Exercise:No exercise None       Hyperlipidemia Follow-Up      Recommended Level of Therapy:Moderate Intensity  (atorvastatin 10-20mg, rosuvastatin 5-10mg, simvastatin 20-40mg, pravastatin 40-80mg, lovastatin 40 mg) AT SOME POINT. Recheck of lipids today .     Other lipid medications/supplements?:  Fenofibrate, without side effects      Cholesterol   Date Value Ref Range Status   10/29/2020 211.7 (H) 0.0 - 200.0 mg/dL Final   10/27/2020 226 (H) <200 mg/dL Final     Comment:     Desirable:       <200 mg/dl      HDL Cholesterol   Date Value Ref Range Status    10/29/2020 25.8 (L) >40.0 mg/dL Final   10/27/2020 21 (L) >39 mg/dL Final      LDL Cholesterol Calculated   Date Value Ref Range Status   10/29/2020 Unable to calculate Trig >=400 0 - 129 mg/dL Final   10/27/2020  <100 mg/dL Final    Cannot estimate LDL when triglyceride exceeds 400 mg/dL     LDL Cholesterol Direct   Date Value Ref Range Status   10/27/2020 66 <100 mg/dL Final     Comment:     Desirable:       <100 mg/dl      Triglycerides   Date Value Ref Range Status   10/29/2020 1,084.5 (H) 0.0 - 150.0 mg/dL Final   10/27/2020 1,750 (H) <150 mg/dL Final     Comment:     Borderline high:  150-199 mg/dl  High:             200-499 mg/dl  Very high:       >499 mg/dl        Cholesterol/HDL Ratio   Date Value Ref Range Status   10/29/2020 8.2 (H) 0.0 - 5.0 Final   ,      Problem, Medication and Allergy Lists were      Patient Active Problem List    Diagnosis Date Noted     Essential hypertension 11/13/2020     Priority: Medium     Hypertriglyceridemia 11/13/2020     Priority: Medium     Diabetes mellitus, type 2 (H) 10/29/2020     Priority: Medium     Morbid obesity (H) 10/29/2020     Priority: Medium     Elevated blood pressure reading without diagnosis of hypertension 10/27/2020     Priority: Medium     Gender dysphoria 10/27/2020     Priority: Medium     Sees Ashlyn Parrish at [facility] - therapist, gender aware       Family history of diabetes mellitus 10/27/2020     Priority: Medium     CARDIOVASCULAR SCREENING; LDL GOAL LESS THAN 160 10/31/2010     Priority: Medium   ,     Current Outpatient Medications   Medication Sig Dispense Refill     BD PEN NEEDLE GOMEZ 2ND GEN 32G X 4 MM miscellaneous Inject 1 Units Subcutaneous 4 times daily as needed       blood glucose (NO BRAND SPECIFIED) lancets standard Use to test blood sugar 2 times daily or as directed. 100 each 11     blood glucose (NO BRAND SPECIFIED) test strip Use to test blood sugar 2 times daily or as directed. 100 each 11     blood  glucose monitoring (NO BRAND SPECIFIED) meter device kit Use to test blood sugar 2 times daily or as directed. 1 kit 0     fenofibrate micronized (LOFIBRA) 200 MG capsule Take 1 capsule (200 mg) by mouth every morning (before breakfast) 60 capsule 0     insulin glargine (LANTUS PEN) 100 UNIT/ML pen Inject 10 Units Subcutaneous At Bedtime 15 mL 0     insulin pen needle (32G X 6 MM) 32G X 6 MM miscellaneous Use 1 pen needles daily or as directed. 50 each 0     lisinopril (ZESTRIL) 5 MG tablet Take 1 tablet (5 mg) by mouth daily 90 tablet 3     metFORMIN (FORTAMET) 1000 MG 24 hr tablet Take 1 tablet (1,000 mg) by mouth 2 times daily (with meals) 180 tablet 3     blood glucose monitoring (SOFTCLIX) lancets 1 each 2 times daily       Blood Glucose Monitoring Suppl (ACCU-CHEK GUIDE) w/Device KIT Inject 1 Units Subcutaneous 2 times daily       NO ACTIVE MEDICATIONS .     ,     Allergies   Allergen Reactions     Ceclor [Cefaclor]    .    Patient is I reviewed  filler.         Review of Systems:   Review of Systems    ROS: Positive for mild diarrhea(metformin side effect), otherwise 10 point ROS neg other than the symptoms noted above in the HPI.    This document serves as a record of the services and decisions personally performed and made by Ana Collado MD. It was created on his/her behalf by Mike Gregory, a trained medical scribe. The creation of this document is based the provider's statements to the medical scribe.  Scribe Mike Gregory  9:28 AM, November 27, 2020           Physical Exam:     Vitals:    11/27/20 0839 11/27/20 0842   BP: (!) 157/89 (!) 156/88   Pulse:  110   Temp:  99.4  F (37.4  C)   TempSrc:  Oral   SpO2:  98%   Weight:  141.1 kg (311 lb)     Body mass index is 46.6 kg/m .  Vitals were reviewed and were normal  Wt Readings from Last 4 Encounters:   11/27/20 141.1 kg (311 lb)   11/11/20 142.4 kg (314 lb)   10/29/20 146.1 kg (322 lb)   10/29/20 146.1 kg (322 lb)        Physical Exam    BMI= Body mass  index is 46.6 kg/m .   GENERAL: healthy, alert and no distress.   PSYCH: Alert and oriented times 3;  affect- anxious      Results:   Results from last visit:  Office Visit on 10/29/2020   Component Date Value Ref Range Status     Cholesterol 10/29/2020 211.7* 0.0 - 200.0 mg/dL Final     Cholesterol/HDL Ratio 10/29/2020 8.2* 0.0 - 5.0 Final     HDL Cholesterol 10/29/2020 25.8* >40.0 mg/dL Final     Triglycerides 10/29/2020 1,084.5* 0.0 - 150.0 mg/dL Final     VLDL Cholesterol 10/29/2020 216.9* 7.0 - 32.0 mg/dL Final     LDL Cholesterol Calculated 10/29/2020 Unable to calculate Trig >=400  0 - 129 mg/dL Final     Creatinine Urine 10/29/2020 201  mg/dL Final     Albumin Urine mg/L 10/29/2020 1,360  mg/L Final     Albumin Urine mg/g Cr 10/29/2020 676.62* 0 - 17 mg/g Cr Final       Assessment and Plan       Osman was seen today for recheck.    Diagnoses and all orders for this visit:    Type 2 diabetes mellitus with microalbuminuria, with long-term current use of insulin (H)  -     Basic Metabolic Panel ([facility])  -     metFORMIN (FORTAMET) 1000 MG 24 hr tablet; Take 1 tablet (1,000 mg) by mouth 2 times daily (with meals)    Essential hypertension  -     lisinopril (ZESTRIL) 5 MG tablet; Take 1 tablet (5 mg) by mouth daily  - We recommend not using spironolactone in combination with lisinopril. Finasteride could be used.  - Great control! Continue the night time lantax. Increase metformin. Increase lisinopril for microalbuminuria. Follow up in a month. Repeat levels today and consider adding statin.     Hypertriglyceridemia  -     Lipid Cascade ([facility])    Gender dysphoria  - Consider trial of progesterone when it is safe to use estrogen. Would use transdermal estrogen patches through the skin when A1C reaches 7.          Medications Discontinued During This Encounter   Medication Reason     ibuprofen (ADVIL/MOTRIN) 200 MG tablet      metFORMIN (GLUCOPHAGE-XR) 500 MG 24 hr tablet Reorder     lisinopril (ZESTRIL)  2.5 MG tablet        Options for treatment and follow-up care were reviewed with the patient. Osman Castellano  engaged in the decision making process and verbalized understanding of the options discussed and agreed with the final plan.    Ana Collado MD  The information in this document, created by the medical scribe for me, accurately reflects the services I personally performed and the decisions made by me. I have reviewed and approved this document for accuracy prior to leaving the patient care area.  Ana Collado MD  9:06 AM, 11/27/20  END TIME: 9:40 AM

## 2020-11-27 NOTE — Clinical Note
Labs pending, doing awesome. Rec to follow up with you q month to document/discuss feminizing goals. Got  started today.   c

## 2020-11-30 ENCOUNTER — TELEPHONE (OUTPATIENT)
Dept: FAMILY MEDICINE | Facility: CLINIC | Age: 30
End: 2020-11-30

## 2020-11-30 DIAGNOSIS — E11.29 TYPE 2 DIABETES MELLITUS WITH MICROALBUMINURIA, WITH LONG-TERM CURRENT USE OF INSULIN (H): Primary | ICD-10-CM

## 2020-11-30 DIAGNOSIS — R80.9 TYPE 2 DIABETES MELLITUS WITH MICROALBUMINURIA, WITH LONG-TERM CURRENT USE OF INSULIN (H): Primary | ICD-10-CM

## 2020-11-30 DIAGNOSIS — Z79.4 TYPE 2 DIABETES MELLITUS WITH MICROALBUMINURIA, WITH LONG-TERM CURRENT USE OF INSULIN (H): Primary | ICD-10-CM

## 2020-11-30 NOTE — TELEPHONE ENCOUNTER
Central Prior Authorization Team   Phone: [phone]    PA Initiation    Medication: metFORMIN (FORTAMET) 1000 MG 24 hr tablet  Insurance Company:    Pharmacy Filling the Rx: [id number] - [place] [id number] [place] [facility] [place] [facility] [address] [facility]  Filling Pharmacy Phone: [phone]  Filling Pharmacy Fax: [phone]  Start Date: 11/30/2020

## 2020-11-30 NOTE — TELEPHONE ENCOUNTER
Prior Authorization Retail Medication Request    Medication/Dose: metFORMIN (FORTAMET) 1000 MG 24 hr tablet  ICD code (if different than what is on RX):  Type 2 diabetes mellitus with microalbuminuria, with long-term current use of insulin (H) [E11.29, R80.9, Z79.4]  - Primary   Previously Tried and Failed:  See Chart  Rationale:  See Chart    Insurance Name:  [facility]  Insurance ID:  [id number]       Pharmacy Information (if different than what is on RX)  Name:  [facility]  Phone:  [phone]

## 2020-12-01 ENCOUNTER — TELEPHONE (OUTPATIENT)
Dept: PHARMACY | Facility: CLINIC | Age: 30
End: 2020-12-01

## 2020-12-01 DIAGNOSIS — Z83.3 FAMILY HISTORY OF DIABETES MELLITUS: Primary | ICD-10-CM

## 2020-12-01 NOTE — TELEPHONE ENCOUNTER
Clinical Pharmacy Consult:                                                    Ghazala Peña is a 30 year old adult called for a clinical pharmacist  follow-up for home blood pressure monitoring.  Ghazala Castellano was referred to me from Dr. Fletcher.     Reason for Consult: Blood pressure monitoring and diabetes follow-up      Discussion:     Hypertension: Ghazala Started lisinopril on 11/15 2.5 mg and has seen a steady decline in blood pressure readings at home. 11/27 increased to 5 mg lisinopril daily at clinic visit.  Ghazala has not experienced dizziness or lightheadedness; she notes a dry cough (inconsistent - 3 times in the past week which Ghazala connects to dry air)    Home Blood Pressure Readings:    Date Time Blood Pressure Pulse Notes   11/26 8am 123/82     11/27 8 am 136/90     11/29 8am 125/85 74    11/30  8 am 122/78  63    12/1 8 am 117/83 70      Blood pressures continue to be at goal without adverse effects. Dry cough is very infrequent and Ghazala connects to environmental dry air and allergies. Will continue to monitor for increase in frequency of dry cough as a potential side effect of lisinopril, but not of high suspicion today.    Diabetes:  Metformin dose was increased to 1000 mg BID on 11/27. Ghazala has noticed a little bit of an increase in gi upset, but this has been tolerable and not consistent. She also attributes this to eating less food with metformin or if she eats more fiber. She also had food poisoning on 11/28 which contributed to soft stool and gi upset. She did not take metformin on the day she had food poisoning. She also continues to take 10 units Lantus QHS. Ghazala is waiting for a prior authorization for 1000 mg ER Metformin from her pharmacy, but has enough 500 mg ER metformin at home to get her through at least another month while waiting for the prior authorization. She would not be opposed to continuing 2 tabs of 500 mg ER metformin if it were covered.    Blood Glucose  Readings:  Fastin/21: 99, : 98, - 96, : 101,  - 97;  - 123;  107; : 85,  - 101    2hrs Post Prandial: : 108, : 109, 126; : 120    Ghazala's blood sugars are at goal and she denies any hypoglycemia. She was educated on hypoglycemia signs/sympotoms and treatment today. As metformin has been titrated up and Ghazala continues to make lifestyle adjustments, it is important to continue to monitor blood sugar and it may be considered to reduce insulin to prevent risk of hypoglycemia. Expect gi effects of metformin to continue to improve as body becomes accustomed to 2000 mg daily dose and recommended to continue to take with food.    Plan:  1. Continue lisinopril 5 mg daily & monitoring blood pressure daily  2. Continue 1000 mg Metformin ER twice daily with food and continue to monitor blood sugar, monitoring for blood sugar <70 mg/dl.  3. PharmD to follow up via phone in 2 weeks.        Barb Ortiz, [facility]

## 2020-12-02 RX ORDER — METFORMIN HCL 500 MG
2000 TABLET, EXTENDED RELEASE 24 HR ORAL
Qty: 360 TABLET | Refills: 3 | Status: SHIPPED | OUTPATIENT
Start: 2020-12-02 | End: 2021-03-08

## 2020-12-02 NOTE — TELEPHONE ENCOUNTER
PRIOR AUTHORIZATION DENIED    Medication: metFORMIN (FORTAMET) 1000 MG-DENIED    Denial Date: 12/1/2020    Denial Rational: PATIENT MUST TRY/FAIL FORMULARY ALTERNATIVES - METFORMIN IR, METFORMIN ER 500MG, 750MG.        Appeal Information:  IF PATIENT IS UNABLE TO TRY/FAIL ALTERNATIVE(S) PLEASE SUPPLY PA TEAM WITH A LETTER OF MEDICAL NECESSITY WITH CLINICAL REASON.

## 2020-12-02 NOTE — TELEPHONE ENCOUNTER
===================    Pharmacy Attestation Statement:    I have verified the content of the note, which accurately reflects my assessment of the patient and the plan of care.     Thomas Guerra, PharmD.    ===================

## 2020-12-02 NOTE — TELEPHONE ENCOUNTER
My last appointment with this patient was 10 years ago and I am not a primary health care provider in any shape of the word    1. Remove my primary care physician designation   2. Please reroute to appropriate health care provider     Steve Mejia MD

## 2020-12-02 NOTE — TELEPHONE ENCOUNTER
I haven't seen patient in 10 years     Please remove primary care physician designation from me    Then close this encounter or Reroute if additional input requested from me     Steve Mejia MD

## 2020-12-02 NOTE — TELEPHONE ENCOUNTER
Dr. Mejia removed as primary provider and routed to Dr. Collado who ordered metformin.  Lizzy Fish,

## 2020-12-09 NOTE — PROGRESS NOTES
Preceptor Attestation:   Patient seen, evaluated and discussed with the resident. I have verified the content of the note, which accurately reflects my assessment of the patient and the plan of care.   Supervising Physician:  Andrae Montilla MD

## 2020-12-16 ENCOUNTER — VIRTUAL VISIT (OUTPATIENT)
Dept: PHARMACY | Facility: CLINIC | Age: 30
End: 2020-12-16
Payer: COMMERCIAL

## 2020-12-16 DIAGNOSIS — Z79.4 TYPE 2 DIABETES MELLITUS WITH MICROALBUMINURIA, WITH LONG-TERM CURRENT USE OF INSULIN (H): Primary | ICD-10-CM

## 2020-12-16 DIAGNOSIS — E11.29 TYPE 2 DIABETES MELLITUS WITH MICROALBUMINURIA, WITH LONG-TERM CURRENT USE OF INSULIN (H): Primary | ICD-10-CM

## 2020-12-16 DIAGNOSIS — I10 ESSENTIAL HYPERTENSION: ICD-10-CM

## 2020-12-16 DIAGNOSIS — E78.1 HYPERTRIGLYCERIDEMIA: ICD-10-CM

## 2020-12-16 DIAGNOSIS — R80.9 TYPE 2 DIABETES MELLITUS WITH MICROALBUMINURIA, WITH LONG-TERM CURRENT USE OF INSULIN (H): Primary | ICD-10-CM

## 2020-12-16 PROCEDURE — 99605 MTMS BY PHARM NP 15 MIN: CPT | Mod: TEL | Performed by: PHARMACIST

## 2020-12-16 PROCEDURE — 99607 MTMS BY PHARM ADDL 15 MIN: CPT | Mod: TEL | Performed by: PHARMACIST

## 2020-12-16 NOTE — Clinical Note
I apologize if this is a duplicate, not sure the last chart sent!    Dr. Meredith, I met with Ghazala today and we decreased her Lantus! Her blood sugars have been at goal and her lifestyle changes could be rewarded and prevent hypoglycemia. I'm happy to meet with her a couple weeks after her visit with you on 1/4 to continue the inertia!    Thanks!  Barb Ortiz, Pharm D.

## 2020-12-16 NOTE — PROGRESS NOTES
MTM ENCOUNTER  SUBJECTIVE/OBJECTIVE:                           Osman Castellano is a 30 year old adult called for a follow-up visit. Ghazala Castellano was referred to me from Dr. Fletcher.  Today's visit is a follow-up MTM visit from 10/29     Reason for visit: Diabetes and hypertesion.    Allergies/ADRs: Reviewed in chart  Tobacco: Ghazala Castellano reports that Ghazala Castellano has never smoked. Ghazala Castellano has never used smokeless tobacco.  Past Medical History: Reviewed in chart      Medication Adherence/Access: no issues reported     Diabetes:  Pt currently taking 1000 mg Metformin BID (1000 mg ER metformin denied by PA); 10 units latus before bed (one night only used 5 units and noted no difference in BG the next day).  Pt does not report problems taking medications regularly.  SMBG: three times daily.   Ranges:     Fasting B/2: 99 mg/dl  12/3: 108  :  99  :  113  12:  112  12 : 114  :  101  129:  101  1210:  91  1211:  87  1212 : 101  12/13:  109  12/14:  103  12/15:  102  1216:  91    Post Prandial BN (Morning, Bedtime)  :  91, 111  12/3:  116, 107  :  101, 108  :  111, 100  12:  96, 100  :  94, 93  12:  103, 91  :  108, 87  10:  111, 94  :  101, 95  :  124, 124  12:  99, 109  :  113, 101  1215:  100, 95  16:  93      Symptoms of low blood sugar? none. Frequency of hypoglycemia? Never  Recent symptoms of high blood sugar? none.    Immunizations: Seasonal Flu? Yes; Pneumonia Vaccine? Hepatitis B  yes  Aspirin: not indicated, <40 years old  Diet/Exercise: Diet - Ghazala is planning meals and timing more often. Calories [id number]. Counting macros: 75-100g protein, <150g net carbs per day, sugar 45g per day, <15 g sat fat. Have been doing this since 10/28. Body feels best when doing this. Ghazala has questions about intermittent fasting between hours of 8:30pm and 11am if ok with current  meds.  Activity: try to walk or jogging sprints: 1 hour per day about 5-6 days per week.       Hypertension: Ghazala continues to take 5 mg lisinopril; no more cough. No lightheadness or dizziness. No numbness or swelling in face/mouth. Ghazala tests her BP every morning and records. She notes getting an Error message (E1) - cuff losing pressure and found that the cuff was too loose. She fixed that and has been getting better readings as of 12/13:    12/13: 123/81 mmHg HR 79 bpm  12/14: 119/79 HR 69  12/15: 119/83 HR 65  12/16: 123/79 HR 64    Hypertriglyceridemia: Ghazala is taking 200 mg fenofibrate every day. She denies any constant or significant abdominal or back pain, no headache. She does note infrequent (2x in past month) dull pain under right breast. She noted this prior to November as well.      Insomnia: Ghazala currently takes Zquil liquid 15 mL 5 days per week to help settle mind and help sleep.      Today's Vitals: There were no vitals taken for this visit. - virtual visit      ASSESSMENT:                              Medication Adherence: No issues identified    Diabetes: Not at goal - dose too high.   Patient is not meeting A1c goal of < 7%. Self monitoring of blood glucose is at goal of fasting <110 mg/dL and post-prandial <180 mg/dL.    Immunizations are up-to-date.     Microalbumin is not at goal < 30 mg/g.  Pt is not up-to-date with recommended annual eye exam and foot exam.  Aspirin therapy is not indicated in this patient due to age.  Pt would benefit from Basal Insulin (lantus) : recommend decrease dose to 8 units at bedtime. Ghazala's fasting and post prandial blood sugars are well below goal. Though she denies symptoms of hypoglycemia, it is recommended to decrease insulin dose to prevent risk of hypoglycemia. Her significant changes in diet and exercise are likely to continue to promote goal blood sugars and may also be rewarded by decreasing insulin. It may be reasonable to discontinue insulin  completely as lifestyle changes have been significant. It is recommended to monitor A1c for 3 months on metformin only to assess efficacy of these lifestyle changes and monotherapy. Additionally, if Ghazala wants to try intermittent fasting, it may be safest to do this once insulin has been discontinued to reduce risk of hypoglycemia. Also to be considered with intermittent fasting is that metformin should be taken with food to prevent nausea and diarrhea (which Ghazala has experienced in the past). Taking metformin once daily may be a good first step for Ghazala to try to see if this is tolerable. It may be reasonable to consider SGLT2s in the future as microalbuminuria was detected at diagnosis in October. These also help with blood pressure and weight loss. If more significant weight loss is prioritized, GLP1 agonists may be considered.    Hypertension: At goal.  Ghazala's blood pressures have been at goal of <130/80mmHg and she denies adverse effects. It is recommended to continue this dose for kidney protection and BP control.    Hypertriglyceridemia: Not at goal - needs additional monitoring  Max triglyceride lowering effects expected in 6-8 weeks. Since Ghazala just started fenofibrate on 11/27, it is reasonable to check lipids at next visit. She denies significant abdominal or back pain which may be associated with fenofibrate use. Mild, dull pain under breast noted infrequently and was noted prior to initiation of fenofibrate so is not related.    Insomnia: At goal  Ghazala finds Zzz-Quil to be effective in treating insomnia. She is using a syrup which likely contains sugar, so in the future it may be beneficial to try melatonin tablets instead which would have no anticholinergic effects and no sugar.      PLAN:                            1) Decrease Lantus to 8 units at bedtime  2) Trial taking all 4 tablets of metformin once daily with food.      I spent 30 minutes with this patient today. All changes were made via  collaborative practice agreement with Dr. Fletcher. A copy of the visit note was provided to the patient's primary care provider.    Will follow up in 4 weeks.    The patient was sent via [employer] a summary of these recommendations.     Barb Ortiz, Pharm D.       I was present for the entire pharmacy encounter.  I agree with the above description of the interview.  I have reviewed the assessment and plan for Ghazala and these reflect our discussed plans.  Thomas Ramírez.D      Patient consented to a telehealth visit: yes  Telemedicine Visit Details  Type of service:  Telephone visit  Start Time: 1:24 PM  End Time: 2:01 PM  Originating Location (patient location): [place]  Distant Location (provider location):  [facility]  Mode of Communication:  Telephone      Medication Therapy Recommendations   Medication Therapy Recommendations  Diabetes mellitus, type 2 (H)    Current Medication: insulin glargine (LANTUS PEN) 100 UNIT/ML pen   Rationale: Dose too high - Dosage too high - Safety   Recommendation: Decrease Dose - Reduce from 10 units to 8 units QHS   Status: Accepted per CPA

## 2021-01-04 ENCOUNTER — OFFICE VISIT (OUTPATIENT)
Dept: PHARMACY | Facility: CLINIC | Age: 31
End: 2021-01-04
Payer: COMMERCIAL

## 2021-01-04 ENCOUNTER — OFFICE VISIT (OUTPATIENT)
Dept: FAMILY MEDICINE | Facility: CLINIC | Age: 31
End: 2021-01-04
Payer: COMMERCIAL

## 2021-01-04 VITALS
SYSTOLIC BLOOD PRESSURE: 131 MMHG | BODY MASS INDEX: 44.41 KG/M2 | HEART RATE: 72 BPM | DIASTOLIC BLOOD PRESSURE: 88 MMHG | OXYGEN SATURATION: 98 % | WEIGHT: 293 LBS | TEMPERATURE: 98.3 F | HEIGHT: 68 IN

## 2021-01-04 VITALS
HEIGHT: 68 IN | WEIGHT: 293 LBS | SYSTOLIC BLOOD PRESSURE: 125 MMHG | DIASTOLIC BLOOD PRESSURE: 83 MMHG | BODY MASS INDEX: 44.41 KG/M2 | TEMPERATURE: 98.3 F | HEART RATE: 72 BPM

## 2021-01-04 DIAGNOSIS — I10 ESSENTIAL HYPERTENSION: ICD-10-CM

## 2021-01-04 DIAGNOSIS — Z79.4 TYPE 2 DIABETES MELLITUS WITH MICROALBUMINURIA, WITH LONG-TERM CURRENT USE OF INSULIN (H): Primary | ICD-10-CM

## 2021-01-04 DIAGNOSIS — E11.29 TYPE 2 DIABETES MELLITUS WITH MICROALBUMINURIA, WITH LONG-TERM CURRENT USE OF INSULIN (H): Primary | ICD-10-CM

## 2021-01-04 DIAGNOSIS — R80.9 TYPE 2 DIABETES MELLITUS WITH MICROALBUMINURIA, WITH LONG-TERM CURRENT USE OF INSULIN (H): Primary | ICD-10-CM

## 2021-01-04 DIAGNOSIS — E78.1 HYPERTRIGLYCERIDEMIA: ICD-10-CM

## 2021-01-04 LAB
CHOLEST SERPL-MCNC: 162.6 MG/DL (ref 0–200)
CHOLEST/HDLC SERPL: 4.3 {RATIO} (ref 0–5)
HDLC SERPL-MCNC: 38.1 MG/DL
LDLC SERPL CALC-MCNC: 88 MG/DL (ref 0–129)
TRIGL SERPL-MCNC: 183.8 MG/DL (ref 0–150)
VLDL CHOLESTEROL: 36.8 MG/DL (ref 7–32)

## 2021-01-04 PROCEDURE — 99214 OFFICE O/P EST MOD 30 MIN: CPT | Mod: GC | Performed by: STUDENT IN AN ORGANIZED HEALTH CARE EDUCATION/TRAINING PROGRAM

## 2021-01-04 PROCEDURE — 36415 COLL VENOUS BLD VENIPUNCTURE: CPT | Performed by: STUDENT IN AN ORGANIZED HEALTH CARE EDUCATION/TRAINING PROGRAM

## 2021-01-04 PROCEDURE — 99607 MTMS BY PHARM ADDL 15 MIN: CPT | Performed by: PHARMACIST

## 2021-01-04 PROCEDURE — 80061 LIPID PANEL: CPT | Performed by: STUDENT IN AN ORGANIZED HEALTH CARE EDUCATION/TRAINING PROGRAM

## 2021-01-04 PROCEDURE — 99606 MTMS BY PHARM EST 15 MIN: CPT | Performed by: PHARMACIST

## 2021-01-04 RX ORDER — FENOFIBRATE 200 MG/1
200 CAPSULE ORAL
Qty: 30 CAPSULE | Refills: 0 | Status: SHIPPED | OUTPATIENT
Start: 2021-01-04 | End: 2021-01-19

## 2021-01-04 ASSESSMENT — ENCOUNTER SYMPTOMS
SHORTNESS OF BREATH: 0
LIGHT-HEADEDNESS: 0
CONSTIPATION: 0
FEVER: 0
VOMITING: 0
ABDOMINAL PAIN: 0
NAUSEA: 0
DIARRHEA: 0
CHILLS: 0
DIZZINESS: 0

## 2021-01-04 ASSESSMENT — MIFFLIN-ST. JEOR
SCORE: 2299.83
SCORE: 2304.64

## 2021-01-04 NOTE — PROGRESS NOTES
"Medication Therapy Management (MTM) Encounter    ASSESSMENT/PLAN:                            Medication Adherence/Access: No issues identified    Diabetes: Not at goal - Unnecessary medication  Patient is not meeting A1c goal of < 7%. Self monitoring of blood glucose is at goal of fasting  mg/dL and post prandial < 180 mg/dL.    Immunizations are up-to-date.     Microalbumin is not at goal < 30 mg/g. Recommend to continue lisinopril and recheck urine albumin at future visit.  Pt is not up-to-date with recommended annual eye exam and foot exam.  Aspirin therapy is not indicated in this patient due to age.  Pt would benefit from Basal Insulin (Lantus) :  Ghazala's fasting and post prandial blood sugar readings have been consistently at goal due to her significant lifestyle changes. She did not notice change in blood sugar readings with decrease in Lantus. It is recommended to discontinue Lantus due to it not likely being necessary to control blood sugar with significant lifestyle modifications and risk for hypoglycemia. Ghazala was educated on the possibility of some ER metformin tablets producing a \"ghost tablet\" and that it does not mean the metformin is not working. She acknowledged understanding and would like to continue taking metformin ER BID. It was discussed with Ghazala that she is likely to see some increases in her blood sugar with discontinuation of Lantus and she was reminded of her goal of fasting 80 - 130mg/dL and 2 hour post prandial <180 mg/dL. It is also reasonable that she checks her BG twice daily instead of TID, especially with discontinuation of Lantus.    Hypertension: At goal  Ghazala's blood pressure at home and in clinic are at goal of <130/80 mmHg. She denies adverse effects. No adjustment recommended.     Hypertriglyceridemia: Not at goal - Needs additional monitoring  Ghazala's triglycerides are not at goal of <150 mg/dL. However, this is not a fasting lipid panel today. Fenofibrate's peak " effects are expected at 6-8 weeks (treatement started 6 weeks ago) so it was reasonable to check these levels today. Ghazala's significant changes in lifestyle have likely contributed to continued improvements in triglyceride levels along with initiation of fenofibrate. Blood sugar control is also expected to reduce triglycerides. It is reasonable to maintain fenofibrate for 6 months - 1 year and recheck fasting lipids to allow for Ghazala to continue lifestyle management and then trial discontinuation of fenofibrate to evaluate whether the hypertriglyceridemia is related to blood sugar and lifestyle or if it may be from a genetic contribution.    Insomnia: Stable  Ghazala reports her insomnia is improving and she requires less Zzz-Quil than before. It was discussed today that the liquid likely contains sugar that may impact her blood sugar readings (especially with stopping Lantus). Additionally, diphenhydramine may have anticholinergic side effects. Since she is already practicing good sleep hygiene limiting screens before bed, melatonin was discussed as a possible replacement to Zzz-Quil liquid. Ghazala will consider this for future discussion.    PLAN:     1) Stop Lantus  2) Reduce testing blood sugars to twice daily  3) Consider melatonin    Will follow up in 2 weeks with Pharm D and 4 weeks with provider.    SUBJECTIVE/OBJECTIVE:                           Osman Castellano is a 30 year old adult coming in for a co-visit with Dr. Meredith.     Reason for visit: Diabetes, Hypertension, and Hypertriglyceridemia follow up.    Allergies/ADRs: Reviewed in chart  Tobacco: Ghazala Castellano reports that Ghazala Castellano has never smoked. Ghazala Castellano has never used smokeless tobacco.  Diet/Exercise: Ghazala continues to track Macros in an jose - fat, protein, carbohydrates. She felt that she indulged a bit more during the holiday season but is back on track.Though her blood sugars have not  reflected significant deviation in diet. She has not tried intermittent fasting yet. Ghazala continues to be active with cardio and weight lifting.  Past Medical History: Reviewed in chart      Medication Adherence/Access: no issues reported; Ghazala takes thorough notes on her medications, blood pressure, and blood sugar daily on paper log sheet.    Diabetes:  Ghazala is currently taking 8 units Lantus at bedtime and Metformin  mg - 2 tablets BID (she did try taking 4 tabs at once and had no adverse effects, but prefers to take 2 tabs BID as she has some concern for not getting whole dose when 4 tabs taken at once and saw a shell from a tablet in her stool),  .  Pt does not report problems taking medications regularly.  SMBG: three times daily and wonders if she could reduce testing to BID:  Fastin/24 - 103  -110   - 140   - 101   - 104   - 98   - 114  1/ - 96  1/3 - 101   - 103    Post Prandial:   - 97, 104   - 106, 125   - 115, 114   - 112, 101   - 106, 98   - 116, 132   - 102, 103   - 97, 118  1/3 - 98, 146   - 110    Symptoms of low blood sugar? none. She noticed no difference in glucose with 2 unit decrease in insulin 2 weeks ago.  Recent symptoms of high blood sugar? none.    Aspirin: No      Hypertension: Ghazala is currently taking lisinopril 5mg QAM. She took it prior to her visit today. She takes her BP every morning at home upon awakening and finds her blood pressure readings are more consistent as she becomes more familiar with measuring at home. She denies any cough or lightheadedness (asside from one episode upon standing up too quickly). Ghazala has some anxiety that she wonders if contributes to hypertension and she is currently managing with therapy and meditation; in the future she would like to discuss medication treatment options with her PCP.    Home Blood Pressure Readings:    Date Time Blood Pressure Pulse Notes     "07:28 125/81 65    12/18 07:36 120/78 64    12/19 07:53 115/79 63    12/20 07:46 114/79 62    12/21 08:13 112/78 67    12/22 07:37 121/79 67    12/23 08:25 115/84 69    12/24 08:02 127/80 66    12/25 07:32 128/81 68    12/26 08:13 129/78 81    12/27 07:42 122/77 66    12/29 09:05 121/79 68    12/30 07:33 119/83 68    12/31 09:09 123/79 73    1/1 09:09 121/77 68    1/2 11:11 123/79 69    1/3 09:39 110/74 77    1/4 08:02 125/83 72        Hypertriglyceridemia: Ghazala is taking fenofibrate 200 mg daily. She denies abdominal pain and unexplained muscle pain or weakness.    Insomnia: Ghazala has reduced the amount of Zzz-Quil she requires in the past few weeks: she currently uses approx 1/2 dose (15 ml) 3 nights per week instead of nightly. She also practices good sleep hygiene including turning off screens prior to bed and listening to podcasts to get to sleep. Ghazala tried melatonin once but didn't know if it was very effective.    Today's Vitals: /83   Pulse 72   Temp 98.3  F (36.8  C) (Oral)   Ht 5' 8.19\" (1.732 m)   Wt 301 lb 6.4 oz (136.7 kg)   BMI 45.57 kg/m          I spent 30 minutes with this patient today. Dr. Meredith was provided the recommendations above  in clinic today and Dr. Whelan is the authorizing prescriber for this visit through the pharmacist collaborative practice agreement.. A copy of the visit note was provided to the patient's referring provider.    The patient was given a summary of these recommendations. See Provider note/AVS from today.     Barb Ortiz, Pharm D.         Medication Therapy Recommendations  Diabetes mellitus, type 2 (H)    Rationale: Treating avoidable adverse medication reaction - Unnecessary medication therapy - Indication   Recommendation: Discontinue Medication - LANTUS VIAL 100 UNIT/ML soln   Status: Accepted per Provider               "

## 2021-01-04 NOTE — PATIENT INSTRUCTIONS
Recommendations from today's MTM visit:                                                    MTM (medication therapy management) is a service provided by a clinical pharmacist designed to help you get the most of out of your medicines.      In addition to Dr. Meredith's recommendation to stop Lantus, may reduce checking blood sugar daily to twice: before breakfast and 2 hours after a meal.    It was great to speak with you today.  I value your experience and would be very thankful for your time with providing feedback on our clinic survey. You may receive a survey via email or text message in the next few days.     Next MTM visit: Follow up with Barb in 2 weeks (phone) and with Dr. Viri Lira (or [facility]) in 4 weeks    To schedule another MTM appointment, please call the clinic directly or you may call the MTM scheduling line at [phone] or toll-free at [phone].     My Clinical Pharmacist's contact information:                                                      It was a pleasure talking with you today!  Please feel free to contact me with any questions or concerns you have.      Barb Ortiz, Pharm D.

## 2021-01-04 NOTE — PROGRESS NOTES
"       HPI       Ghazala Castellano is a 30 year old  who presents for   Chief Complaint   Patient presents with     RECHECK     HRT     seeing pharmacy - last visit 12/16 where they decreased lantus to 8U at bedtime and trialing 2000 mg metformin once daily with food.    Taking 8U lantus now - \"no change in glucose readings really\"   See pharm note for specific glucose readings.   Doing well  No readings over 200   All well within goal     If she eats too much, doesn't \"feel too well\" afterwards   One time she felt lightheaded but thought it was due to some congestion that has now resolved.     BP better controlled too.  No side effects with lisinopril  No chest pain, shortness of breath, vision changes, RUQ abd pain, no vision changes.     Problem, Medication and Allergy Lists were reviewed and updated if needed.    Patient is an established patient of this clinic.  Past Medical History:   Diagnosis Date     Anxiety      Depressive disorder      Environmental allergies     flare-up in fall          Review of Systems:   Review of Systems   Constitutional: Negative for chills and fever.   Respiratory: Negative for shortness of breath.    Cardiovascular: Negative for chest pain.   Gastrointestinal: Negative for abdominal pain, constipation, diarrhea, nausea and vomiting.   Neurological: Negative for dizziness and light-headedness.   Sometimes aching in right side of chest \"maybe I bumped it\" super infrequent. Hasn't been persistent and hasn't tried any meds with it       Physical Exam:     Vitals:    01/04/21 1335   BP: 125/83   Pulse: 72   Temp: 98.3  F (36.8  C)   TempSrc: Oral   SpO2: 98%   Weight: 136.5 kg (301 lb)   Height: 1.727 m (5' 8\")     Body mass index is 45.77 kg/m .  Vitals were reviewed and were normal     Physical Exam  Vitals signs reviewed.   Constitutional:       General: Ghazala Castellano is not in acute distress.     Appearance: Normal appearance. Ghazala Castellano is obese. Ghazala " Casey Castellano is not ill-appearing or diaphoretic.   HENT:      Head: Normocephalic and atraumatic.   Eyes:      General: No scleral icterus.     Conjunctiva/sclera: Conjunctivae normal.   Cardiovascular:      Rate and Rhythm: Normal rate and regular rhythm.      Pulses: Normal pulses.      Heart sounds: Normal heart sounds. No murmur.   Pulmonary:      Effort: Pulmonary effort is normal. No respiratory distress.      Breath sounds: Normal breath sounds. No wheezing.   Abdominal:      Palpations: Abdomen is soft.      Tenderness: There is no abdominal tenderness.   Skin:     General: Skin is warm and dry.   Neurological:      Mental Status: Ghazala Castellano is alert.   Psychiatric:         Mood and Affect: Mood normal.         Behavior: Behavior normal.         Thought Content: Thought content normal.         Judgment: Judgment normal.         Results:   Results are ordered and pending    Assessment and Plan      Type 2 diabetes mellitus with microalbuminuria, with long-term current use of insulin (H)  Following with pharm, too. Better controlled- expecting A1c to be much improved in 4 weeks. Glucose well within range. Stopping lantus today.   Continue 2000 mg metformin  Continue bringing logs to appt    Essential hypertension  Now controlled. On 5 mg lisinopril. Continue.     Hypertriglyceridemia  Initially triglycerides >1000s. Started on max dose fibrate. Repeat lipid today. Expect to see max effects of fibrate at this time.  Continue until follow up with pharmD- then likely decrease dose and consider stopping fibrate as appropriate.   -     Lipid Licking ([facility])  -     fenofibrate micronized (LOFIBRA) 200 MG capsule; Take 1 capsule (200 mg) by mouth every morning (before breakfast)    Patient originally came to [facility] to start HRT. I informed Ghazala that she is very close to starting HRT after all of her hard work controlling diabetes and hypertension, hypertriglycerides.     Follow up in 2  weeks with PharmD.   Follow up in 4 weeks with PCP.      Medications Discontinued During This Encounter   Medication Reason     insulin glargine (LANTUS PEN) 100 UNIT/ML pen      insulin pen needle (32G X 6 MM) 32G X 6 MM miscellaneous      fenofibrate micronized (LOFIBRA) 200 MG capsule Reorder     Options for treatment and follow-up care were reviewed with the patient. Osman Castellano  engaged in the decision making process and verbalized understanding of the options discussed and agreed with the final plan.    Damian Meredith MD

## 2021-01-04 NOTE — Clinical Note
Dr. Meredith,  Thank you for the co-visit today. I discussed with Thomas about when it might be beneficial to stop fenofibrate and he brought up a good point that it would maybe be best for Ghazala to continue to build up confidence and continue these lifestyle changes before stopping the fibrate. Maybe in 6 months we could trial discontinuing fenofibrate to try to help discern if there is any familial component or if these maintained lifestyle changes are enough to continue to control TGs.   Barb Ortiz, Pharm D.

## 2021-01-04 NOTE — PATIENT INSTRUCTIONS
Here is the plan from today's visit    1. Type 2 diabetes mellitus with microalbuminuria, with long-term current use of insulin (H)  Stop lantus/insulin  Keep up the good work! Keep bringing in glucose logs- those are super helpful.   Continue metformin 2000 mg daily     2. Essential hypertension  Continue 5 mg lisinopril daily-- now at goal     3. Hypertriglyceridemia  Follow up with Barb in 2 weeks.  Keep taking fibrate  May decrease in 2-4 weeks    Recheck A1c in 4 weeks with PCP or [facility]     Please call or return to clinic if your symptoms don't go away.    Thank you for coming to [facility] today.  Lab Testing:  **If you had lab testing today and your results are reassuring or normal they will be mailed to you or sent through [employer] within 7 days.   **If the lab tests need quick action we will call you with the results.  The phone number we will call with results is # [phone] (home) . If this is not the best number please call our clinic and change the number.  Medication Refills:  If you need any refills please call your pharmacy and they will contact us.   If you need to  your refill at a new pharmacy, please contact the new pharmacy directly. The new pharmacy will help you get your medications transferred faster.   Scheduling:  If you have any concerns about today's visit or wish to schedule another appointment please call our office during normal business hours [phone] (8-5:00 M-F)  If a referral was made to a [facility] and you don't get a call from central scheduling please call [phone].  If a Mammogram was ordered for you at [facility] call [phone] to schedule or change your appointment.  If you had an XRay/CT/Ultrasound/MRI ordered the number is [phone] to schedule or change your radiology appointment.   Medical Concerns:  If you have urgent medical concerns please call [phone] at any time of the day.    Damian  Keisha Meredith MD

## 2021-01-10 ENCOUNTER — HEALTH MAINTENANCE LETTER (OUTPATIENT)
Age: 31
End: 2021-01-10

## 2021-01-10 NOTE — PROGRESS NOTES
I have verified the content of the note, which accurately reflects my assessment of the patient and the plan of care.   Thomas Guerra, [facility], PharmD

## 2021-01-16 NOTE — PROGRESS NOTES
Preceptor Attestation:    Patient seen and evaluated in person. I discussed the patient with the resident. I have verified the content of the note, which accurately reflects my assessment of the patient and the plan of care.   Supervising Physician:  Martha Whelan MD.

## 2021-01-19 ENCOUNTER — VIRTUAL VISIT (OUTPATIENT)
Dept: PHARMACY | Facility: CLINIC | Age: 31
End: 2021-01-19
Payer: COMMERCIAL

## 2021-01-19 DIAGNOSIS — I10 ESSENTIAL HYPERTENSION: ICD-10-CM

## 2021-01-19 DIAGNOSIS — E78.1 HYPERTRIGLYCERIDEMIA: ICD-10-CM

## 2021-01-19 DIAGNOSIS — Z79.4 TYPE 2 DIABETES MELLITUS WITH MICROALBUMINURIA, WITH LONG-TERM CURRENT USE OF INSULIN (H): Primary | ICD-10-CM

## 2021-01-19 DIAGNOSIS — E11.29 TYPE 2 DIABETES MELLITUS WITH MICROALBUMINURIA, WITH LONG-TERM CURRENT USE OF INSULIN (H): Primary | ICD-10-CM

## 2021-01-19 DIAGNOSIS — R80.9 TYPE 2 DIABETES MELLITUS WITH MICROALBUMINURIA, WITH LONG-TERM CURRENT USE OF INSULIN (H): Primary | ICD-10-CM

## 2021-01-19 PROCEDURE — 99607 MTMS BY PHARM ADDL 15 MIN: CPT | Mod: TEL | Performed by: PHARMACIST

## 2021-01-19 PROCEDURE — 99606 MTMS BY PHARM EST 15 MIN: CPT | Mod: TEL | Performed by: PHARMACIST

## 2021-01-19 RX ORDER — FENOFIBRATE 200 MG/1
200 CAPSULE ORAL
Qty: 90 CAPSULE | Refills: 1 | Status: SHIPPED | OUTPATIENT
Start: 2021-01-19 | End: 2021-03-08

## 2021-01-19 NOTE — Clinical Note
"Dr. Chopra,  I believe you may be seeing Ghazala for the first time in 2 weeks so I wanted to forward you my recommendation for labs at that visit (A1C, urine albumin, fasting lipid). She also noted an \"acrid\" smell to her urine which I wondered if a ketone test would be beneficial? Her blood sugars are not at all elevated, so I'm not thinking DKA but it was a concern she shared with me on the phone today. She is also due for an annual foot exam to put that on your radar.  She is doing extremely well with her new diagnosis of DM in October!  Barb Ortiz, Pharm D.     "

## 2021-01-19 NOTE — PATIENT INSTRUCTIONS
Recommendations from today's MTM visit:                                                    MTM (medication therapy management) is a service provided by a clinical pharmacist designed to help you get the most of out of your medicines.      There are no changes in your medications today. Continue checking your feet for any cuts or sores. A goal would be to do this daily.    I went ahead and sent a 90 day refill of your fenofibrate 200 mg capsules to [facility] on [facility] so that if you need it, you can get it prior to your visit on February 4th with Dr. Chopra.     It was great to speak with you today.  I value your experience and would be very thankful for your time with providing feedback on our clinic survey. You may receive a survey via email or text message in the next few days.     Next MTM visit: 6 weeks (4 weeks after visit with Dr. Chopra)    To schedule another MTM appointment, please call the clinic directly or you may call the MTM scheduling line at [phone] or toll-free at [phone].     My Clinical Pharmacist's contact information:                                                      It was a pleasure talking with you today!  Please feel free to contact me with any questions or concerns you have.      Barb Ortiz, Pharm D.   [phone]

## 2021-01-19 NOTE — PROGRESS NOTES
Medication Therapy Management (MTM) Encounter    ASSESSMENT:                            Medication Adherence/Access: No issues identified - There are no medications that are known to cause change in urine smell on Ghazala's list.    Diabetes: At goal  Though patient is not meeting A1c goal of < 7%, Self monitoring of blood glucose is at goal of fasting  mg/dL and post prandial < 180 mg/dL. Consistently at goal.    Immunizations are not up-to-date.   Next Hep B vaccine due 3/27/21  Microalbumin is not at goal < 30 mg/g. Recommend continue lisinopril for kidney protection  Pt is not up-to-date with recommended annual eye exam and foot exam. She was instructed today to do daily foot exams which she has been doing periodically already.  Aspirin therapy is not indicated in this patient due to age.  Pt would benefit from no changes. It is recommended that she get an A1C at next visit as well as urine albumin to monitor progress as Ghazala has made significant lifestyle modifications. Her sugars have remained at goal with discontinuation of insulin and is titrated to recommended dose of 2000 mg metformin. Her post prandial blood sugars are at goal but low end of normal (110s), though she denies hypoglycemia. She does see variability with exercise in BG, but this is not to an extent where she is out of goal or safety range. Ghazala's reported change in urine odor does not appear to be related to any medications. Her reported blood sugars would not indicate DKA. However, with change in diet/exercise, its possible she is experiencing increased ketones as the body is breaking down fat for energy. Further discussion of diet is recommended. Would recommend evaluating ketones in urine at next visit as potential cause of urine odor.    Hypertension: At goal.  Ghazala's blood pressure is <130/80 mmHg and she denies side effects. It is recommended to maintain lisinopril for kidney benefit and blood pressure  lowering.    Hypertrigliceridemia: Needs Additional Monitoring  Ghazala's triglycerides were not at goal of <150 mg/dL. See note from 1/4/20 for further assessment of hypertriglyceridemia. Recommend to continue therapy for 6 months - 1 year to help to further support lifestyle changes and protect from pancreatitis from elevated triglycerides. A fasting lipid level is recommended at next visit to better assess fenofibrate efficacy.    Insomnia: At goal.  Ghazala has found melatonin 3 mg to be effective in helping her fall asleep. It is recommended if needed to increase to  melatonin 5 mg instead of diphenhydramine as diphenhydramine may have anticholinergic side effects such as urinary retention and dry mouth with longer duration of action which may cause drowsiness in the morning.    Allergies: At goal.  Ghazala finds apple cider vinegar to be effective at treating her allergies. She has no symptoms currently, no further recommendation.    PLAN:                              To Patient:  1) Increase frequency of foot exam to daily  2) Fenofibrate ordered for 90 day supply     To Provider:  1) Recommend labs at next visit: A1c, urine albumin, fasting lipids  2) Consider urine ketones to better understand acrid urine reported by patient.  3) Recommend annual foot exam    Follow-up: 6 weeks with PharmD    SUBJECTIVE/OBJECTIVE:                          Osman Castellano is a 30 year old adult called for a follow-up visit. She was referred to me from Dr. Meredith.  Today's visit is a follow-up MTM visit from 1/4/2021     Reason for visit: Medication Therapy Management - Diabetes, Hypertension, Hypertriglyceridemia, Insomnia    Allergies/ADRs: Reviewed in chart  Tobacco: She reports that she has never smoked. She has never used smokeless tobacco.  Caffeine: 1 cups/day of tea  Activity: 30-45 mins of cardio 5 days per week  Past Medical History: Reviewed in chart      Medication Adherence/Access: no issues reported. Ghazala wonders  "if any of her medications may make her urine smell \"acrid\" (no pain or burning when urinating, not cloudy) as she has noticed a change in urine smell when she started these medications.    Diabetes:  Pt currently taking Metformin  mg 2 tabs BID with food.  She denies any gi upset with this medication. Stopped Lantus on  and has not noticed much difference in her blood sugar readings .  Pt does not report problems taking medications regularly.SMBG: two times daily.     Ranges:   Fastin/5: 95  : 115  : 109  : 109  : 107  1/10:  109  : 102  : 95  : 106  : 100  1/15: 109  : 99  : 97  : 107  : 100    2hr Post Prandial:     : 97   118  : 123  : 100  : 101  1/10: 101  : 113  : 107  : 102  : 112  1/15: 93  : 96  : 104  : 113    Symptoms of low blood sugar? none.   Recent symptoms of high blood sugar? none.  Urinary symptoms have improved.  Last eye exam was: not recorded  Last foot exam was: not recorded, though she does periodically monitor her feet at home for cuts and feeling.  Immunizations: Seasonal Flu? Yes; Pneumonia Vaccine? Yes; Hep B - due for 3rd dose 3/27/21.  Most Recent Immunizations   Administered Date(s) Administered     HepB 2002     HepB-Adult 2020     Influenza Vaccine IM > 6 months Valent IIV4 10/29/2020     MMR 2002     Pneumococcal 23 valent 2020     Tdap (Adacel,Boostrix) 2020     Tdap (Adult) Unspecified Formulation 2002       Aspirin: no  Diet/Exercise: Ghazala is drinking smoothies (fruits and veggies) for breakfast and notices that her body responds better to natural sugars in fruits/veggies than refined sugars. She eats regular meals throughout the day. She noticed BG drops 2 hours after smoothies and sometimes goes up or down when she checks before and after exercise. Maybe notice some increasing BG with more strenous exercise. Still monitoring nutrition. "     Lab Results   Component Value Date    A1C 10.5 10/27/2020       Hypertension: Ghazala is currently taking 5 mg lisinopril daily and denies cough, dizziness, or lightheadedness. She limits sodium to <2300mg and drinks an average of 1 gallon per day of water.      Home Blood Pressure Readings:     Date Time Blood Pressure Pulse Notes   1/5 (all morning readings) 120/79 68    1/6  122/78 66    1/7  119/75 72    1/8  116/78 73    1/9  118/80 65    1/10  118/80 69    1/11  122/76 62    1/12  120/74 62    1/13  121/75 74    1/14  114/77 68    1/15  112/75 76    1/16  114/79 73    1/17  113/78 68    1/18  113/78 66    1/19  117/76 67          Hypertriglyceremia: Ghazala is currently taking fenofibrate 200 mg capsule daily. She denies any side effects. Ghazala also requested a refill of this medication as 30 day supply without refills was last ordered.    Insomnia: Ghazala stopped Zzz-quil due to sugar content and started taking 3mg melatonin per night. She also has 5 mg melatonin 5 mg at home and  diphenhydramine capsules too, but hasn't been needing these.     Allergies: Ghazala is currently taking 1 tablespoon of apple cidar vinegar for allergies. She finds this is effective (no symptoms reported today) and has no desire for additional therapy.      Today's Vitals: There were no vitals taken for this visit. - virtual visit  ----------------        I spent 36 minutes with this patient today. Evelyne Meredith and Keysha were provided the recommendations above  via routed note and Dr. Manzanares is the authorizing prescriber for this visit through the pharmacist collaborative practice agreement.. A copy of the visit note was provided to the patient's primary care provider.    The patient was given a summary of these recommendations via [employer].     Barb Ortiz, Pharm D.       I have verified the content of the note, which accurately reflects my assessment of the patient and the plan of care.     Glendy Cochran, DesireeD,  [facility]        Telemedicine Visit Details  Type of service:  Telephone visit  Start Time: 1:50 PM  End Time: 2:26 PM  Originating Location (patient location): Home  Distant Location (provider location):  [facility]        Medication Therapy Recommendations  Diabetes mellitus, type 2 (H)    Current Medication: metFORMIN (GLUCOPHAGE-XR) 500 MG 24 hr tablet   Rationale: Medication requires monitoring - Needs additional monitoring   Recommendation: Order Lab - A1C   Status: Accepted per CPA          Current Medication: metFORMIN (GLUCOPHAGE-XR) 500 MG 24 hr tablet   Rationale: Medication requires monitoring - Needs additional monitoring   Recommendation: Order Lab - Urine Albumin   Status: Accepted per CPA         Hypertriglyceridemia    Current Medication: fenofibrate micronized (LOFIBRA) 200 MG capsule   Rationale: Medication requires monitoring - Needs additional monitoring   Recommendation: Order Lab - Fasting Lipid   Status: Accepted per CPA

## 2021-01-20 DIAGNOSIS — R80.9 TYPE 2 DIABETES MELLITUS WITH MICROALBUMINURIA, WITH LONG-TERM CURRENT USE OF INSULIN (H): Primary | ICD-10-CM

## 2021-01-20 DIAGNOSIS — E11.29 TYPE 2 DIABETES MELLITUS WITH MICROALBUMINURIA, WITH LONG-TERM CURRENT USE OF INSULIN (H): Primary | ICD-10-CM

## 2021-01-20 DIAGNOSIS — E78.1 HYPERTRIGLYCERIDEMIA: ICD-10-CM

## 2021-01-20 DIAGNOSIS — Z79.4 TYPE 2 DIABETES MELLITUS WITH MICROALBUMINURIA, WITH LONG-TERM CURRENT USE OF INSULIN (H): Primary | ICD-10-CM

## 2021-02-04 ENCOUNTER — OFFICE VISIT (OUTPATIENT)
Dept: FAMILY MEDICINE | Facility: CLINIC | Age: 31
End: 2021-02-04
Payer: COMMERCIAL

## 2021-02-04 VITALS
HEART RATE: 92 BPM | TEMPERATURE: 99.6 F | OXYGEN SATURATION: 98 % | RESPIRATION RATE: 20 BRPM | WEIGHT: 291 LBS | BODY MASS INDEX: 44.1 KG/M2 | SYSTOLIC BLOOD PRESSURE: 132 MMHG | HEIGHT: 68 IN | DIASTOLIC BLOOD PRESSURE: 84 MMHG

## 2021-02-04 DIAGNOSIS — R80.9 TYPE 2 DIABETES MELLITUS WITH MICROALBUMINURIA, WITHOUT LONG-TERM CURRENT USE OF INSULIN (H): Primary | ICD-10-CM

## 2021-02-04 DIAGNOSIS — F64.9 GENDER DYSPHORIA: ICD-10-CM

## 2021-02-04 DIAGNOSIS — E78.1 HYPERTRIGLYCERIDEMIA: ICD-10-CM

## 2021-02-04 DIAGNOSIS — E11.29 TYPE 2 DIABETES MELLITUS WITH MICROALBUMINURIA, WITHOUT LONG-TERM CURRENT USE OF INSULIN (H): Primary | ICD-10-CM

## 2021-02-04 LAB
ALBUMIN SERPL-MCNC: 5.1 MG/DL (ref 3.8–5)
ALP SERPL-CCNC: 30.8 U/L (ref 31.7–110.7)
ALT SERPL-CCNC: 17 U/L (ref 0–45)
AST SERPL-CCNC: 24.3 U/L (ref 0–55)
BILIRUB SERPL-MCNC: 0.6 MG/DL (ref 0.2–1.3)
BILIRUBIN UR: NEGATIVE MG/DL
BLOOD UR: NEGATIVE MG/DL
BUN SERPL-MCNC: 7.8 MG/DL (ref 7–21)
CALCIUM SERPL-MCNC: 9.9 MG/DL (ref 8.5–10.1)
CHLORIDE SERPLBLD-SCNC: 100.8 MMOL/L (ref 98–110)
CHOLEST SERPL-MCNC: 150.7 MG/DL (ref 0–200)
CHOLEST/HDLC SERPL: 5.1 {RATIO} (ref 0–5)
CO2 SERPL-SCNC: 34.7 MMOL/L (ref 20–32)
CREAT SERPL-MCNC: 0.8 MG/DL (ref 0.7–1.3)
CREAT UR-MCNC: 128 MG/DL
GFR SERPL CREATININE-BSD FRML MDRD: >90 ML/MIN/1.7 M2
GLUCOSE SERPL-MCNC: 116.1 MG'DL (ref 70–99)
GLUCOSE URINE: NEGATIVE
HBA1C MFR BLD: 5.2 % (ref 4.1–5.7)
HDLC SERPL-MCNC: 29.7 MG/DL
HEMOGLOBIN: 14.9 G/DL (ref 13.3–17.7)
KETONES UR QL: NEGATIVE MG/DL
LDLC SERPL CALC-MCNC: 85 MG/DL (ref 0–129)
LEUKOCYTE ESTERASE UR: NEGATIVE
MICROALBUMIN UR-MCNC: 185 MG/L
MICROALBUMIN/CREAT UR: 144.53 MG/G CR (ref 0–17)
NITRITE UR QL STRIP: NEGATIVE MG/DL
PH UR STRIP: 6.5 [PH] (ref 4.5–8)
POTASSIUM SERPL-SCNC: 3.9 MMOL/L (ref 3.3–4.5)
PROT SERPL-MCNC: 7.9 G/DL (ref 6.8–8.8)
PROTEIN UR: ABNORMAL MG/DL
SODIUM SERPL-SCNC: 133.7 MMOL/L (ref 132.6–141.4)
SP GR UR STRIP: 1.02 (ref 1–1.03)
TRIGL SERPL-MCNC: 179.5 MG/DL (ref 0–150)
UROBILINOGEN UR STRIP-ACNC: ABNORMAL E.U./DL
VIT B12 SERPL-MCNC: 342 PG/ML (ref 193–986)
VLDL CHOLESTEROL: 35.9 MG/DL (ref 7–32)

## 2021-02-04 PROCEDURE — 99214 OFFICE O/P EST MOD 30 MIN: CPT | Mod: GC | Performed by: STUDENT IN AN ORGANIZED HEALTH CARE EDUCATION/TRAINING PROGRAM

## 2021-02-04 PROCEDURE — 82043 UR ALBUMIN QUANTITATIVE: CPT | Performed by: FAMILY MEDICINE

## 2021-02-04 PROCEDURE — 80053 COMPREHEN METABOLIC PANEL: CPT | Performed by: STUDENT IN AN ORGANIZED HEALTH CARE EDUCATION/TRAINING PROGRAM

## 2021-02-04 PROCEDURE — 80061 LIPID PANEL: CPT | Performed by: STUDENT IN AN ORGANIZED HEALTH CARE EDUCATION/TRAINING PROGRAM

## 2021-02-04 PROCEDURE — 85018 HEMOGLOBIN: CPT | Performed by: STUDENT IN AN ORGANIZED HEALTH CARE EDUCATION/TRAINING PROGRAM

## 2021-02-04 PROCEDURE — 36415 COLL VENOUS BLD VENIPUNCTURE: CPT | Performed by: STUDENT IN AN ORGANIZED HEALTH CARE EDUCATION/TRAINING PROGRAM

## 2021-02-04 PROCEDURE — 81003 URINALYSIS AUTO W/O SCOPE: CPT | Performed by: STUDENT IN AN ORGANIZED HEALTH CARE EDUCATION/TRAINING PROGRAM

## 2021-02-04 PROCEDURE — 83036 HEMOGLOBIN GLYCOSYLATED A1C: CPT | Performed by: STUDENT IN AN ORGANIZED HEALTH CARE EDUCATION/TRAINING PROGRAM

## 2021-02-04 PROCEDURE — 82607 VITAMIN B-12: CPT | Performed by: STUDENT IN AN ORGANIZED HEALTH CARE EDUCATION/TRAINING PROGRAM

## 2021-02-04 ASSESSMENT — MIFFLIN-ST. JEOR: SCORE: 2254.47

## 2021-02-04 NOTE — PROGRESS NOTES
Assessment & Plan     Type 2 diabetes mellitus with microalbuminuria, without long-term current use of insulin (H)  Hypertriglyceridemia  Patient with 30 diabetes who is now transitioned off of insulin and is on Metformin twice daily, is not vegan or vegetarian.  We did the diabetic foot exam today which had no concerning findings, she understands that she is due to get her eye visit done and I have sent a referral to this effect.  She is well connected with pharmacy for diabetes management support.  Today we are getting a CMP (R effects of Metformin), UA looking for ketones and sugars, albumin in urine, a hemoglobin A1c.  While she is not vegan or vegetarian given Metformin's known side effect of causing low B12, we are also checking this today to see if she would benefit from supplementation.  I have discussed this with the patient sure she knows that these results will appear in her labs for today.  When all of these results are back I will reach out to her regarding the timeline of beginning her HRT.  Given the blood sugars that she is recorded (mostly 90s to 110s), I anticipate that her A1c will be much improved.  She should continue with her home cares.  - EYE ADULT REFERRAL  - Vitamin B12  - Comprehensive Metabolic Panel ([facility])  -- Urinalysis([facility])  - Albumin Random Urine Quantitative with Creat Ratio  - Hemoglobin A1c ([facility])  - Lipid Panel ([facility])    Gender dysphoria  Discussed HRT today, patient is interested in starting estrogen, patches and injections are most favored right now, provided her A1c makes this safe to initiate.  Per chart review it looks like Dr. Collado has also discussed progesterone, patient notes recognizing spironolactone as a blocker and the patient was reassured that we will support her and work with her to establisha regimen to best manage her desired outcomes.  We discussed micro teams as the patient has some anxiety around coming to the doctor and health care, which is  "appropriate.  We are very excited to support her in her process and we talked through who she may expect to see and hear from should she have questions.  If it is now safe to start her HRT, I will reach out to her and let her know to schedule her next available with myself, Dr. Collado, or another member of our micro team so she can begin the process of soon as possible.  We discussed that at that time she will also require a testosterone baseline level.  - Hemoglobin (HGB) ([facility])      BMI:   Estimated body mass index is 44.25 kg/m  as calculated from the following:    Height as of this encounter: 1.727 m (5' 8\").    Weight as of this encounter: 132 kg (291 lb).         MEDICATIONS:  Continue current medications without change  FUTURE APPOINTMENTS:       - Follow-up visit in two weeks to a month, depending on lab results today       - Schedule fasting labs in 3 months, then make appointment to review therapy plan       - Schedule eye exam (annual) for diabetes    No follow-ups on file.    Sima Gilbert MD  [facility] YANNTAMMI Javed is a 30 year old who presents to clinic today for the following health issues accompanied by herself.  Ms. Piper has been working very hard the past few months, initially presented for hormone replacement therapy as part of management for her gender dysphoria however was found to have type 2 diabetes, hypertriglyceridemia, hypertension that had to be managed to minimize the possibility of negative side effects from the HRT.  She has worked very hard and has made great improvement from both a blood sugar, blood pressure perspective.  She is presenting today for lab tests to monitor her treatment effects.  If her A1c is below 7, we can initiate hormone replacement therapy at her next visit.  She has been taking her medication as directed and notes no side effects, she says her blood sugar has been well controlled in the 80s (not " "symptomatic) to 120s on her Metformin.  Her blood pressure has been well controlled on her current regimen, she is checking it daily.  She is caring for her feet and doing regular exams, has noted no ulcers, wounds, sensation loss.  She is appropriately anxious about starting her therapies as this has been a much bigger deal than she was anticipating.  She has no further questions or concerns and would just like to get to the next steps.    HPI       Review of Systems   No wounds to feet, no loss of sensation, no tingling, no burning.  Has some coldness in the feet.    Positive for anxiety.      Objective    /84   Pulse 92   Temp 99.6  F (37.6  C) (Oral)   Resp 20   Ht 1.727 m (5' 8\")   Wt 132 kg (291 lb)   SpO2 98%   BMI 44.25 kg/m    Body mass index is 44.25 kg/m .  Physical Exam   Vital signs reviewed  Constitutional: Age appropriate human, well kempt, no acute distress  HENT: Sclera non-icteric and not injected, pink conjunctivae  Cardiac: Regular rate  Resp: Speaking in full sentences on room air, no respiratory distress  Skin: Warm, dry  Msk: Ambulates independently, full range of gesture, no lower extremity edema  Diabetic foot exam: normal DP and PT pulses, no trophic changes or ulcerative lesions, normal sensory exam and normal monofilament exam  Neuro: Alert and oriented x4, movements coordinated and symmetric, appropriate gait  Psych: No acute distress      No results found for this or any previous visit (from the past 24 hour(s)).          "

## 2021-02-04 NOTE — PROGRESS NOTES
Preceptor Attestation:    Patient seen and evaluated in person. I discussed the patient with the resident. I have verified the content of the note, which accurately reflects my assessment of the patient and the plan of care.   Supervising Physician:  Ariel Sherwood MD, MD.

## 2021-02-04 NOTE — PATIENT INSTRUCTIONS
Here is the plan from today's visit    1. Type 2 diabetes mellitus with microalbuminuria, without long-term current use of insulin (H)  Checking A1c, your urine, and your lipids today.  Since you are on Metformin we are also checking on your liver, electrolytes and B12 level.  Outside of a vegan/vegetarian lifestyle it is uncommon for B12 to be significantly impacted by Metformin but not unheard of so today we are checking a B12 level to see if he would benefit from supplementation.  Your foot exam today was negative for anything concerning, continue to keep doing what you are doing with your feet they are very well cared for and healthy.  You should see an eye doctor for a diabetic eye exam, someone should call you to help make that appointment as I have sent a new referral.  Whenever you can get that done within the calendar year is fine.  I will reach out to you when all your lab results are in, I anticipate that we will be within the next day or 2, with our next steps.  - EYE ADULT REFERRAL  - Vitamin B12  - Comprehensive Metabolic Panel ([facility])  -- Urinalysis([facility])  - Albumin Random Urine Quantitative with Creat Ratio  - Hemoglobin A1c ([facility])  - Lipid Panel ([facility])    2. Gender dysphoria  You have worked incredibly hard to get here from October, for what it is worth a see and recognize the amount of work you have done to get where you need to be.  You are doing all the right things and I anticipate that we will soon be able to move onto HRT.  I will reach out to you tomorrow to let you know if we can schedule your next appointment to start HRT, at that time we will need to do a testosterone level.  If everything is as we hope today, I will put in for you to have the next available appointment with Dr. Collado, myself, or one of our microtea members (on the sheet you took home with you) to initiate HRT.  - Hemoglobin (HGB) ([facility])    3. Hypertriglyceridemia  We will check your triglycerides today,  continue to work on your healthy eating and follow-up with pharmacy if you have questions about no medications.  - Lipid Panel ([facility])    Please call or return to clinic if your symptoms don't go away.    Follow up plan  I will reach out to you when your results are back to discuss when your next follow-up should be.    Thank you for coming to [facility] today.  Lab Testing:  **If you had lab testing today and your results are reassuring or normal they will be mailed to you or sent through [facility] within 7 days.   **If the lab tests need quick action we will call you with the results.  The phone number we will call with results is # [phone] (home) . If this is not the best number please call our clinic and change the number.  Medication Refills:  If you need any refills please call your pharmacy and they will contact us.   If you need to  your refill at a new pharmacy, please contact the new pharmacy directly. The new pharmacy will help you get your medications transferred faster.   Scheduling:  If you have any concerns about today's visit or wish to schedule another appointment please call our office during normal business hours [phone] (8-5:00 M-F)  If a referral was made to a [facility] and you don't get a call from central scheduling please call [phone].     Medical Concerns:  If you have urgent medical concerns please call [phone] at any time of the day.    Sima Gilbert MD

## 2021-02-05 NOTE — RESULT ENCOUNTER NOTE
B12 and Hgb wnl  A1c significantly improved, now within normal limits  BMP - nothing actionable, will monitor  Lipids and urine albumin improving, recheck in 3 months and continue with current therapies

## 2021-02-10 ENCOUNTER — OFFICE VISIT (OUTPATIENT)
Dept: FAMILY MEDICINE | Facility: CLINIC | Age: 31
End: 2021-02-10
Payer: COMMERCIAL

## 2021-02-10 VITALS
TEMPERATURE: 99.2 F | DIASTOLIC BLOOD PRESSURE: 80 MMHG | RESPIRATION RATE: 18 BRPM | OXYGEN SATURATION: 96 % | WEIGHT: 293 LBS | HEART RATE: 88 BPM | BODY MASS INDEX: 45.13 KG/M2 | SYSTOLIC BLOOD PRESSURE: 135 MMHG

## 2021-02-10 DIAGNOSIS — F64.9 GENDER DYSPHORIA: Primary | ICD-10-CM

## 2021-02-10 PROCEDURE — 84403 ASSAY OF TOTAL TESTOSTERONE: CPT | Performed by: STUDENT IN AN ORGANIZED HEALTH CARE EDUCATION/TRAINING PROGRAM

## 2021-02-10 PROCEDURE — 99214 OFFICE O/P EST MOD 30 MIN: CPT | Mod: GC | Performed by: STUDENT IN AN ORGANIZED HEALTH CARE EDUCATION/TRAINING PROGRAM

## 2021-02-10 PROCEDURE — 36415 COLL VENOUS BLD VENIPUNCTURE: CPT | Performed by: STUDENT IN AN ORGANIZED HEALTH CARE EDUCATION/TRAINING PROGRAM

## 2021-02-10 PROCEDURE — 99000 SPECIMEN HANDLING OFFICE-LAB: CPT | Performed by: STUDENT IN AN ORGANIZED HEALTH CARE EDUCATION/TRAINING PROGRAM

## 2021-02-10 RX ORDER — ESTRADIOL 0.05 MG/D
1 PATCH TRANSDERMAL WEEKLY
Qty: 8 PATCH | Refills: 0 | Status: SHIPPED | OUTPATIENT
Start: 2021-02-10 | End: 2021-04-27 | Stop reason: DRUGHIGH

## 2021-02-10 NOTE — PROGRESS NOTES
Preceptor Attestation:    Patient seen and evaluated in person. I discussed the patient with the resident. I have verified the content of the note, which accurately reflects my assessment of the patient and the plan of care.   Supervising Physician:  Venecia Sanchez MD.

## 2021-02-10 NOTE — PATIENT INSTRUCTIONS
Here is the plan from today's visit    1. Gender dysphoria  Repeated congratulations and excitement from your care team over being able to start this process.  I have sent prescription for your patch to your preferred pharmacy, if you are having difficulty getting the patch to stick clean the underlying skin with an alcohol pad or rubbing alcohol prior to attaching the pad.  Replace patch once a week on the same day and approximately the same time.  You may start them whenever you wish.  We will check in in 4 weeks to see how you are doing, you can always check in via [facility] sooner if you have questions or concerns.  Per discussion I am glad you have therapy and mental health support, while I do not anticipate significant mood fluctuations with just the estrogen addition there can be a lot of complicated emotions around starting HRT (especially as this has been a longer term goal), please let us know if there is anything we can be doing to support you during this..  There are no wrong questions.  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  - estradiol (CLIMARA) 0.05 MG/24HR weekly patch; Place 1 patch onto the skin once a week  Dispense: 8 patch; Refill: 0  - Testosterone total      Please call or return to clinic if your symptoms don't go away.    Follow up plan  Follow up with me (or Dr Collado) and PharmD in four weeks for lab check and med check in.  Reach out if you have any questions.    Thank you for coming to [facility] today.  Lab Testing:  **If you had lab testing today and your results are reassuring or normal they will be mailed to you or sent through [facility] within 7 days.   **If the lab tests need quick action we will call you with the results.  The phone number we will call with results is # [phone] (home) . If this is not the best number please call our clinic and change the number.  Medication Refills:  If you need any  refills please call your pharmacy and they will contact us.   If you need to  your refill at a new pharmacy, please contact the new pharmacy directly. The new pharmacy will help you get your medications transferred faster.   Scheduling:  If you have any concerns about today's visit or wish to schedule another appointment please call our office during normal business hours [phone] (8-5:00 M-F)  If a referral was made to a [facility] and you don't get a call from central scheduling please call [phone].     Medical Concerns:  If you have urgent medical concerns please call [phone] at any time of the day.    Sima Gilbert MD

## 2021-02-10 NOTE — PROGRESS NOTES
"    Assessment & Plan     Gender dysphoria  Ghazala has done an incredible amount of work and is now at a good place to start HRT.  This visit was spent reviewing the feminizing hormone consent form (which should be scanned into the EMR) which was signed, the patient indicates that she is interested in both estrogen and androgen blockers.  At this visit we will be initiating estrogen patch, as well as checking a testosterone total level.  The benefits and risks of different estrogen modalities were discussed, the patient's preference was between patch and injection more strongly towards patch, as the patient will be starting a statin soon and the patch is good for patients with transaminitis and VTE risk we decided this would be a good methodology with which to start.  We will start 1 medication at a time so as not to confuse side effects or impacts, in the future we can discuss an androgen blocker and this consent has already been completed.  The patient expresses some significant anxiety around interacting with the healthcare system and we have set the expectation that we will be doing monthly visits as we adjust medication regimen but will then be able to space them out more.  We will see her in 1 month, at which time she will also check in with Pharm.D. around her lipid and glucose control medications, for a more holistic visit (HRT and general medical care).  - estradiol (CLIMARA) 0.05 MG/24HR weekly patch  Dispense: 8 patch; Refill: 0  - Testosterone total  0956}     BMI:   Estimated body mass index is 45.13 kg/m  as calculated from the following:    Height as of 2/4/21: 1.727 m (5' 8\").    Weight as of this encounter: 134.6 kg (296 lb 12.8 oz).       Return in about 4 weeks (around 3/10/2021) for With me and with PharmD, for Lab Work, Call patient and schedule appt, Thank you kindly.    Sima Gilbert MD  [facility]   Ghazala Javed is a 30 year old who presents for " the following health issues  accompanied by herself:    HPI   Patient has been working with her therapist regarding problems her gender dysphoria for over a year, first came to gender confirming care in October 2020 to begin HRT.  At that time she was found to have hypertension, diabetes, and the decision was made to optimize her medical care prior to starting HRT.  She has done an incredible amount of hard work and is now in a good place to begin HRT.  She has the consent form with her to start feminizing therapies, and would like to discuss which forms of estrogen would be best for her.    She does have significant anxiety and has questions around whether to expect significant mood side effects with starting different HRT.  The potential dysregulation of starting HRT is not greater than the benefit of managing her dysphoria at this time however she would like to be forewarned.    Review of Systems   No complaints or concerns.      Objective    /80   Pulse 88   Temp 99.2  F (37.3  C) (Oral)   Resp 18   Wt 134.6 kg (296 lb 12.8 oz)   SpO2 96%   BMI 45.13 kg/m    Body mass index is 45.13 kg/m .  Physical Exam   Vital signs reviewed  Constitutional: Age appropriate human, no acute distress, well kempt  HENT: Sclera non-icteric and not injected, pink conjunctivae  Resp: Speaking in full sentences on room air, no respiratory distress  Msk: Ambulates independently, full range of gesture  Neuro: Alert and oriented x4, movements coordinated and symmetric, appropriate gait  Psych: Slightly anxious affect, baseline      Office Visit on 02/04/2021   Component Date Value Ref Range Status     Specific Gravity Urine 02/04/2021 1.020  1.005 - 1.030 Final     pH Urine 02/04/2021 6.5  4.5 - 8.0 Final     Leukocyte Esterase UR 02/04/2021 Negative  NEGATIVE Final     Nitrite Urine 02/04/2021 Negative  NEGATIVE mg/dL Final     Protein UR 02/04/2021 1+* NEGATIVE mg/dL Final     Glucose Urine 02/04/2021 Negative  NEGATIVE  Final     Ketones Urine 02/04/2021 Negative  NEGATIVE mg/dL Final     Urobilinogen mg/dL 02/04/2021 1.0 E.U./dL  0.2 E.U./dL E.U./dL Final     Bilirubin UR 02/04/2021 Negative  NEGATIVE mg/dL Final     Blood UR 02/04/2021 Negative  NEGATIVE mg/dL Final     Creatinine Urine 02/04/2021 128  mg/dL Final     Albumin Urine mg/L 02/04/2021 185  mg/L Final     Albumin Urine mg/g Cr 02/04/2021 144.53* 0 - 17 mg/g Cr Final     Cholesterol 02/04/2021 150.7  0.0 - 200.0 mg/dL Final     Cholesterol/HDL Ratio 02/04/2021 5.1* 0.0 - 5.0 Final     HDL Cholesterol 02/04/2021 29.7* >40.0 mg/dL Final     Triglycerides 02/04/2021 179.5* 0.0 - 150.0 mg/dL Final     VLDL Cholesterol 02/04/2021 35.9* 7.0 - 32.0 mg/dL Final     LDL Cholesterol Calculated 02/04/2021 85  0 - 129 mg/dL Final     Hemoglobin A1C 02/04/2021 5.2  4.1 - 5.7 % Final     Hemoglobin 02/04/2021 14.9  13.3 - 17.7 g/dL Final     Vitamin B12 02/04/2021 342  193 - 986 pg/mL Final     Calcium 02/04/2021 9.9  8.5 - 10.1 mg/dL Final     Chloride 02/04/2021 100.8  98.0 - 110.0 mmol/L Final     Carbon Dioxide 02/04/2021 34.7* 20.0 - 32.0 mmol/L Final     Creatinine 02/04/2021 0.8  0.7 - 1.3 mg/dL Final     Glucose 02/04/2021 116.1* 70.0 - 99.0 mg'dL Final     Potassium 02/04/2021 3.9  3.3 - 4.5 mmol/L Final     Sodium 02/04/2021 133.7  132.6 - 141.4 mmol/L Final     Protein Total 02/04/2021 7.9  6.8 - 8.8 g/dL Final     GFR Estimate 02/04/2021 >90  >60.0 mL/min/1.7 m2 Final     GFR Estimate If Black 02/04/2021 >90  >60.0 mL/min/1.7 m2 Final     Albumin 02/04/2021 5.1* 3.8 - 5.0 mg/dL Final     Alkaline Phosphatase 02/04/2021 30.8* 31.7 - 110.7 U/L Final     ALT 02/04/2021 17.0  0.0 - 45.0 U/L Final     AST 02/04/2021 24.3  0.0 - 55.0 U/L Final     Bilirubin Total 02/04/2021 0.6  0.2 - 1.3 mg/dL Final     Urea Nitrogen 02/04/2021 7.8  7.0 - 21.0 mg/dL Final     No results found for this or any previous visit (from the past 24 hour(s)).

## 2021-02-12 LAB — TESTOST SERPL-MCNC: 355 NG/DL (ref 240–950)

## 2021-02-24 ENCOUNTER — VIRTUAL VISIT (OUTPATIENT)
Dept: PHARMACY | Facility: CLINIC | Age: 31
End: 2021-02-24
Payer: COMMERCIAL

## 2021-02-24 VITALS — SYSTOLIC BLOOD PRESSURE: 120 MMHG | HEART RATE: 66 BPM | DIASTOLIC BLOOD PRESSURE: 76 MMHG

## 2021-02-24 DIAGNOSIS — I10 ESSENTIAL HYPERTENSION: ICD-10-CM

## 2021-02-24 DIAGNOSIS — R80.9 TYPE 2 DIABETES MELLITUS WITH MICROALBUMINURIA, WITHOUT LONG-TERM CURRENT USE OF INSULIN (H): Primary | ICD-10-CM

## 2021-02-24 DIAGNOSIS — E11.29 TYPE 2 DIABETES MELLITUS WITH MICROALBUMINURIA, WITHOUT LONG-TERM CURRENT USE OF INSULIN (H): Primary | ICD-10-CM

## 2021-02-24 DIAGNOSIS — E78.1 HYPERTRIGLYCERIDEMIA: ICD-10-CM

## 2021-02-24 DIAGNOSIS — F64.9 GENDER DYSPHORIA: ICD-10-CM

## 2021-02-24 PROCEDURE — 99606 MTMS BY PHARM EST 15 MIN: CPT | Mod: TEL | Performed by: PHARMACIST

## 2021-02-24 PROCEDURE — 99607 MTMS BY PHARM ADDL 15 MIN: CPT | Mod: TEL | Performed by: PHARMACIST

## 2021-02-24 NOTE — PROGRESS NOTES
Medication Therapy Management (MTM) Encounter    ASSESSMENT:                            Medication Adherence/Access: No issues identified    Diabetes: At goal.  Patient is meeting A1c goal of < 7%. Self monitoring of blood glucose is at goal of fasting  mg/dL and post prandial < 180 mg/dL.    Immunizations are up-to-date.     Microalbumin is not at goal < 30 mg/g.  Has improved in the past 4 months.   Pt is not up-to-date with recommended annual eye exam. -Referral has been placed  Aspirin therapy is not indicated in this patient due to age.  Statin therapy is not indicated for this patient due to age.  Pt would benefit from no changes..        Hypertension: At goal.  Ghazala's blood pressure is at goal of less than 130/80 mmHg. Recommend continue lisinopril for continued blood pressure control, kidney protection, and is well-tolerated.      Hypertriglyceridemia: Not at goal: Needs additional monitoring.  Though not at goal of less than 150, triglycerides continue to go down with fibrate therapy and with blood sugar control.  It is well-tolerated.  Recommend to continue fibrate and continue monitoring triglycerides to help support lifestyle changes and reduce risks with hormone replacement therapy.  Recommend recheck lipids in 3 months.    Hormone Replacement Therapy: Stable.  Ghazala reports some mood changes with new estradiol therapy.  These are tolerable side effects to her.  Had trouble initially with patches staying on for full 7 days.  Now that placing an arm, able to have full 7 days of therapy with 1 patch.  She was educated today on signs and symptoms of blood clot to monitor for that risk.    Supplements: Stable.  Vitamin D 5000 international units twice weekly is safe and effective dosing.  No changes recommended today.    Insomnia: At goal  Ghazala uses melatonin 3 mg every night along with sleep hygiene and this is effective to help her sleep.  No changes recommended today.      PLAN:                             To Patient:  Continue to follow with primary care provider for management of chronic conditions and hormone replacement.    To Provider:  Recommend lipid panel in 3 months    Follow-up: With primary care provider as directed    SUBJECTIVE/OBJECTIVE:                          Osman Castellano is a 30 year old adult called for a follow-up visit. She was referred to me from Dr. Fletcher.  Today's visit is a follow-up MTM visit from 2021.     Reason for visit: Medication therapy management.    Allergies/ADRs: Reviewed in chart  Tobacco: She reports that she has never smoked. She has never used smokeless tobacco.  Past Medical History: Reviewed in chart      Medication Adherence/Access: no issues reported    Diabetes:  Pt currently taking 500 mg metformin XR 2 tabs BID.  Pt does not report problems taking medications regularly. Never missed a dose.  SMBG: two times daily.   Ranges:     2/10 - Fastin   97, 114   - 100, 96   - 115, 89   - 106, 97  15 - 104, 100   -102, 118   - 105, 122   - 111, 110   - 98, 96   -101, 98   - 99, 100   - 98, 101   - 105 (fasting)   - 93, 91    Symptoms of low blood sugar? none. Frequency of hypoglycemia? Never.  Recent symptoms of high blood sugar? none.  Does note some tingling in fingers occasionally, which resolves with movement of digits.  Last eye exam was: None - Referral placed 2021  Last foot exam was: 2021  Aspirin: No  Statin: No  Diet/Exercise: Not doing intermittent fasting currently.  Current workout schedule is Monday, Tuesday, Thursday, Friday mostly cardio.  She notices that her endurance is improving as she continues these lifestyle modifications.  Immunizations:   Most Recent Immunizations   Administered Date(s) Administered     HepB 2002     HepB-Adult 2020     Influenza Vaccine IM > 6 months Valent IIV4 10/29/2020     MMR 2002     Pneumococcal 23 valent 2020     Tdap  (Adacel,Boostrix) 11/27/2020     Tdap (Adult) Unspecified Formulation 08/20/2002     Lab Results   Component Value Date    A1C 5.2 02/04/2021    A1C 10.5 10/27/2020     10/29/2020    Ref Range & Units 3mo ago    Creatinine Urine mg/dL 201     Albumin Urine mg/L mg/L 1,360     Albumin Urine mg/g Cr 0 - 17 mg/g Cr      2/4/2021  Ref Range & Units 2wk ago    Creatinine Urine mg/dL 128    Albumin Urine mg/L mg/L 185    Albumin Urine mg/g Cr 0 - 17 mg/g Cr 144.53High             Hypertension: 5 mg lisinopril every morning.  Denies cough, dizziness, lightheadedness, and swelling or numbness in face and mouth.    Home Blood Pressure Readings:  Notices anxiety makes systolic higher. Relaxes and re-checks    Date Time Blood Pressure Pulse Notes   2/10 07:30 - 8:30 120/76 71    2/11  115/78 61    2/12  115/80 70    2/13  115/78 66    2/14  117/79 65    2/15  126/79 62    2/16  124/72 67    2/17  112/76 66    2/18  123/76 73    2/19  116/77 72    2/20  118/79 73    2/21  116/75 72    2/22  118/77 68    2/23  113/74 71    2/24  120/76 66          Hypertriglyceridemia: Taking fenofibrate 200 mg daily in the morning.  Does not report any side effects.    Recent Labs   Lab Test 02/04/21  0851 01/04/21  1423   CHOL 150.7 162.6   HDL 29.7* 38.1*   LDL 85 88   TRIG 179.5* 183.8*   CHOLHDLRATIO 5.1* 4.3         Hormone Replacement Therapy: Estradiol patches; changing once per week - Changing Thursdays. Now on arm to help stick. Noticing some increased irritability, a little bit more emotional, and less patient. Physical. All mild changes. No swelling, redness, pain, noted in extremities.    Supplements: Taking vitamin D 5000 international unit(s) twice weekly      Insomnia: Taking melatonin 3mg every night and practices good sleep hygiene.  This is effective for patient.    Today's Vitals: /76   Pulse 66   ----------------      I spent 40 minutes with this patient today. All changes were made via collaborative practice  agreement with Dr. Perez. A copy of the visit note was provided to the patient's primary care provider.    The patient was sent via [employer] a summary of these recommendations.     Barb Ortiz, Pharm D.         Telemedicine Visit Details  Type of service:  Telephone visit  Start Time: 14:52  End Time: 15:35  Originating Location (patient location): [place]  Distant Location (provider location):  [facility] [place]              Medication Therapy Recommendations  Hypertriglyceridemia    Current Medication: fenofibrate micronized (LOFIBRA) 200 MG capsule   Rationale: Medication requires monitoring - Needs additional monitoring   Recommendation: Continue to Monitor - Recommend continued monitoring and future lab   Status: Accepted - no CPA Needed

## 2021-02-24 NOTE — PATIENT INSTRUCTIONS
Recommendations from today's MTM visit:                                                    MTM (medication therapy management) is a service provided by a clinical pharmacist designed to help you get the most of out of your medicines.      As we discussed today, there is a small chance for blood clots with estrogen so I want you to know what to watch for.  These are the signs and symptoms to watch for and let us know if you are experiencing any of them immediately:    Swelling in legs or arms (especially if one leg or arm gets more swelling than the other)    Pain in legs or arms    Red splotches on skin of legs or arms    Warm to the touch    It was great to speak with you today. As we discussed, please never hesitate to reach out to the pharmacy team at [facility] via phone or MyChart with any questions!      I value your experience and would be very thankful for your time with providing feedback on our clinic survey. You may receive a survey via email or text message in the next few days.     Your next visit will be with your doctor as you have discussed.    To schedule another MTM appointment, please call the clinic directly or you may call the MTM scheduling line at [phone] or toll-free at [phone].     My Clinical Pharmacist's contact information:                                                      It was a pleasure talking with you today!  Please feel free to contact me with any questions or concerns you have.      Barb Ortiz, Pharm D.   [phone]

## 2021-02-24 NOTE — Clinical Note
I had the opportunity to meet with Ghazala today for a phone visit. She continues to do well.  We discussed today that we likely do not need regular Pharm.D. follow-ups.  But she can always reach out with questions and concerns.  The only recommendation for the care team would be to recheck lipids in 3 months.  Barb Ortiz, Pharm D.

## 2021-03-03 NOTE — PROGRESS NOTES
I have verified the content of the note, which accurately reflects my assessment of the patient and the plan of care.   Thomas Guerra, [facility], PharmD

## 2021-03-08 ENCOUNTER — VIRTUAL VISIT (OUTPATIENT)
Dept: FAMILY MEDICINE | Facility: CLINIC | Age: 31
End: 2021-03-08
Payer: COMMERCIAL

## 2021-03-08 DIAGNOSIS — R80.9 TYPE 2 DIABETES MELLITUS WITH MICROALBUMINURIA, WITHOUT LONG-TERM CURRENT USE OF INSULIN (H): ICD-10-CM

## 2021-03-08 DIAGNOSIS — E78.1 HYPERTRIGLYCERIDEMIA: ICD-10-CM

## 2021-03-08 DIAGNOSIS — I10 ESSENTIAL HYPERTENSION: ICD-10-CM

## 2021-03-08 DIAGNOSIS — E11.29 TYPE 2 DIABETES MELLITUS WITH MICROALBUMINURIA, WITHOUT LONG-TERM CURRENT USE OF INSULIN (H): ICD-10-CM

## 2021-03-08 DIAGNOSIS — F64.9 GENDER DYSPHORIA: Primary | ICD-10-CM

## 2021-03-08 PROBLEM — E66.01 CLASS 3 SEVERE OBESITY DUE TO EXCESS CALORIES WITH SERIOUS COMORBIDITY AND BODY MASS INDEX (BMI) OF 45.0 TO 49.9 IN ADULT (H): Status: ACTIVE | Noted: 2020-10-29

## 2021-03-08 PROBLEM — E66.813 CLASS 3 SEVERE OBESITY DUE TO EXCESS CALORIES WITH SERIOUS COMORBIDITY AND BODY MASS INDEX (BMI) OF 45.0 TO 49.9 IN ADULT (H): Status: ACTIVE | Noted: 2020-10-29

## 2021-03-08 PROBLEM — R03.0 ELEVATED BLOOD PRESSURE READING WITHOUT DIAGNOSIS OF HYPERTENSION: Status: RESOLVED | Noted: 2020-10-27 | Resolved: 2021-03-08

## 2021-03-08 PROCEDURE — 99214 OFFICE O/P EST MOD 30 MIN: CPT | Mod: 95 | Performed by: FAMILY MEDICINE

## 2021-03-08 RX ORDER — LISINOPRIL 10 MG/1
10 TABLET ORAL DAILY
Qty: 90 TABLET | Refills: 3 | Status: SHIPPED | OUTPATIENT
Start: 2021-03-08 | End: 2022-01-27

## 2021-03-08 RX ORDER — METFORMIN HCL 500 MG
1000 TABLET, EXTENDED RELEASE 24 HR ORAL 2 TIMES DAILY
Qty: 360 TABLET | Refills: 3
Start: 2021-03-08 | End: 2021-11-04

## 2021-03-08 RX ORDER — FENOFIBRATE 200 MG/1
200 CAPSULE ORAL
Qty: 90 CAPSULE | Refills: 3 | Status: SHIPPED | OUTPATIENT
Start: 2021-03-08 | End: 2021-04-27

## 2021-03-08 RX ORDER — ESTRADIOL 0.1 MG/D
2 PATCH TRANSDERMAL WEEKLY
Qty: 24 PATCH | Refills: 1 | Status: SHIPPED | OUTPATIENT
Start: 2021-03-08 | End: 2021-06-08

## 2021-03-08 NOTE — PATIENT INSTRUCTIONS
Gender  1. Increase estrogen to 0.2 mg. Apply two patches once a week  - I recommend rotating patch sites  - If you want to use up your 0.05 mg patches, you will need to apply two of them and then one of the new 0.1 mg patches (total of three patches)  2. Once you've been on the 0.2mg patches for one month, I will check labs  3. Ordered future labs.  - Schedule lab only visit in the morning- these will be fasting labs so fast 6 hours prior to visit    Diabetes  1. Continue metformin as prescribed  2. We will increase your lisinopril to 10mg daily to try to decrease protein in urine  - Check protein 6-8 weeks after medication increase  - If urine protein levels are still elevated, I will increase dose one more time    Cholesterol  1. Provided refill on fenofibrate.  - Refill on file at the [facility] pharmacy

## 2021-03-08 NOTE — Clinical Note
Lindsey:  JOSE ALBERTO on your pt. Rec next visit after labs (she will schedule lab only, fasting) after being on 0.2 mg for 1 month, then see if wants to start finasteride (avoid dimas due to ACEi). I bumped ACE dose to get at microalbumin.   c

## 2021-03-08 NOTE — PROGRESS NOTES
Ghazala Javed is a 30 year old who is being evaluated via a billable telephone visit.      How would you like to obtain your AVS? MyChart    Assessment & Plan     Gender dysphoria  - Targeting body hair, dose increase to 200mcg given control of other chronic conditions.   - Would avoid spironolactone as blocker given use of ACE inhibitor and risk of hyperkalemia, disucssed finasteride, progesterone, lupron and maximization of estradiol dosing.  - estradiol (CLIMARA) 0.1 MG/24HR weekly patch  Dispense: 24 patch; Refill: 1  - Gucose Fasting ([facility])  - Lipid Panel ([facility])  - Testosterone total    Type 2 diabetes mellitus with microalbuminuria, with long-term current use of insulin (H)  - Continue excellent work   - metFORMIN (GLUCOPHAGE-XR) 500 MG 24 hr tablet  Dispense: 360 tablet; Refill: 3    Essential hypertension  - Back off on frequency of BP checks and increase lisnopril not for BP but for microalbumin (above)  - lisinopril (ZESTRIL) 10 MG tablet  Dispense: 90 tablet; Refill: 3    Hypertriglyceridemia  - Refills provided and future labs ordered once estradiol is at max dose  - fenofibrate micronized (LOFIBRA) 200 MG capsule  Dispense: 90 capsule; Refill: 3      Return in about 4 weeks (around 4/5/2021) for Hormones with Dr. Chopra - labs due, 4 weeks, in person.    The information in this document, created by the medical scribe for me, accurately reflects the services I personally performed and the decisions made by me. I have reviewed and approved this document for accuracy prior to leaving the patient care area.    Ana Collado MD  4:29 PM, 03/08/21  [facility]    Subjective   Ghazala Javed is a 30 year old who presents for the following health issues     HPI     Gender  Has questions regarding labs and T blockers. Estrogen patch every Thursday at noon. Had some anxiety around patch placement, but spoke with Dr. Chopra who recommended applying patches to the arm. Patches are going  well. More irritable and emotional. Worried about slow feminizing results. Experiencing dysphoria around facial and body hair, body shape. Was pulling out facial hair without knowing it, has some scarring. Has questions about atrophy on finasteride. Would prefer to wait on estrogen dose increase before starting blocker.     No PMHx or FHx with blood clots.    DM  Has been managing DM well, taking two tablets metformin BID, no side effects. Endorses paresthesias in the bilateral toes.    HTN  Taking 5mg lisinopril. On fenofibrate, wonders if she will have refills on med.    Labs 02/04/21- Protein in urine    Social Hx  No tobacco use.    Review of Systems     Positive for: increased irritability, emotional, paresthesias in the bilateral toes, gender dysphoria, anxiety, trichotillomania    See HPI for additional sxs.    This document serves as a record of the services and decisions personally performed and made by Ana Collado MD. It was created on his/her behalf by Angie Eubanks, a trained medical scribe. The creation of this document is based the provider's statements to the medical scribe.  Scribe Angie Eubanks 4:03 PM, March 8, 2021        Objective      Vitals:  No vitals were obtained today due to virtual visit.    Physical Exam   healthy, alert and no distress  PSYCH: Alert and oriented times 3; coherent speech, normal   rate and volume, able to articulate logical thoughts, able   to abstract reason. Her vocal affect is anxious  RESP: No cough, no audible wheezing, able to talk in full sentences  Remainder of exam unable to be completed due to telephone visits    Office Visit on 02/10/2021   Component Date Value Ref Range Status     Testosterone Total 02/10/2021 355  240 - 950 ng/dL Final    Comment: This test was developed and its performance characteristics determined by the   [facility].   It has not been cleared or approved by the FDA. The  laboratory is regulated   under CLIA as qualified to perform high-complexity testing. This test is used   for clinical purposes. It should not be regarded as investigational or for   research.       Reviewed by me- micro albumin still elevated at 144 goal under 25    Start Time: 4:03 PM  End Time: 4:40 PM    Phone call duration: 37 minutes

## 2021-03-09 ENCOUNTER — TELEPHONE (OUTPATIENT)
Dept: FAMILY MEDICINE | Facility: CLINIC | Age: 31
End: 2021-03-09

## 2021-03-09 ENCOUNTER — MYC MEDICAL ADVICE (OUTPATIENT)
Dept: FAMILY MEDICINE | Facility: CLINIC | Age: 31
End: 2021-03-09

## 2021-03-09 NOTE — TELEPHONE ENCOUNTER
Prior Authorization Retail Medication Request    Medication/Dose: estradiol (CLIMARA) 0.05 MG/24HR weekly patch  ICD code (if different than what is on RX):  Gender dysphoria [F64.9]  - Primary   Previously Tried and Failed:  See chart  Rationale:  See chart    Insurance Name:  [employer]  Insurance ID:  [id number]       Pharmacy Information (if different than what is on RX)  Name:  [facility]  Phone:  [phone]

## 2021-03-09 NOTE — TELEPHONE ENCOUNTER
[facility] phone call message- medication clarification/question:    Full Medication Name: estradiol (CLIMARA) 0.1 MG/24HR weekly patch   Dose: see chart    Question/Clarification needed: Patient calling, as they were advised by the pharmacy that there is an issue with this prescription and they are faxing PA to clinic today. Please return patient's call to advise of prescription status once update is available.       Pharmacy confirmed as   [id number] - [place], [place] - [address] AT [facility] [address] [facility]  [address]  [facility] [place] [address]  Phone: [phone] Fax: [phone]  : Yes    Please leave ONLY preferred pharmacy    OK to leave a message on voice mail? Yes    Advised patient that RN would call back within 3 hours, unless emergent.    Primary language: English      needed? No    Call taken on March 9, 2021 at 11:36 AM by Erlinda Llanos    Route to [facility]

## 2021-03-09 NOTE — TELEPHONE ENCOUNTER
RN routing to Prior Authorization team to initiate PA for patient's eastradiol patches that were prescribed by Dr. Collado on 3/8/21.   Elva Bustillo RN

## 2021-03-10 NOTE — TELEPHONE ENCOUNTER
PA Initiation    Medication: estradiol weekly patch  Insurance Company: [employer] - Phone [phone] Fax [phone]  Pharmacy Filling the Rx: [facility] #[id number] - [place] [address] AT [facility] [address] [facility]  Filling Pharmacy Phone: [phone]  Filling Pharmacy Fax:    Start Date: 3/10/2021    Central Prior Authorization Team   Phone: [phone]

## 2021-03-12 NOTE — TELEPHONE ENCOUNTER
Prior Authorization Approval    Authorization Effective Date: 1/1/2021  Authorization Expiration Date: 3/11/2022  Medication: estradiol weekly patch  Approved Dose/Quantity: 8/28 days  Reference #: [id number]   Insurance Company: [employer] - Phone [phone] Fax [phone]  Which Pharmacy is filling the prescription (Not needed for infusion/clinic administered): [id number] - [facility] [address] [place] [address] AVE [address] AT [facility] OF [place] [facility]  Pharmacy Notified: Yes **Instructed pharmacy to notify patient when script is ready to /ship.**  Patient Notified: Yes

## 2021-03-17 ENCOUNTER — TELEPHONE (OUTPATIENT)
Dept: PHARMACY | Facility: CLINIC | Age: 31
End: 2021-03-17

## 2021-03-17 NOTE — TELEPHONE ENCOUNTER
Clinical Pharmacy Telephone Encounter     Called to speak with Ghazala about blood pressure control. BP previously controlled, but lisinopril was increased at last visit with Dr. Collado for proteinuria.    She reports BP today was 112/78mmHg. She states it is typically around 115/75mmHg.     Ghazala denies symptoms of hypotension (dizziness, lightheadedness) and adverse effects from ACE-I.     Encouraged patient to follow-up with any questions/concerns.    Glendy Cochran, PharmD, BCACP

## 2021-04-22 DIAGNOSIS — F64.9 GENDER DYSPHORIA: ICD-10-CM

## 2021-04-22 LAB
CHOLEST SERPL-MCNC: 155.3 MG/DL (ref 0–200)
CHOLEST/HDLC SERPL: 3.7 {RATIO} (ref 0–5)
GLUCOSE P FAST SERPL-MCNC: 99 MG/DL (ref 51–109)
HDLC SERPL-MCNC: 42 MG/DL
LDLC SERPL CALC-MCNC: 87 MG/DL (ref 0–129)
TRIGL SERPL-MCNC: 132.4 MG/DL (ref 0–150)
VLDL CHOLESTEROL: 26.5 MG/DL (ref 7–32)

## 2021-04-22 PROCEDURE — 80061 LIPID PANEL: CPT

## 2021-04-22 PROCEDURE — 82947 ASSAY GLUCOSE BLOOD QUANT: CPT

## 2021-04-22 PROCEDURE — 36415 COLL VENOUS BLD VENIPUNCTURE: CPT

## 2021-04-22 PROCEDURE — 84403 ASSAY OF TOTAL TESTOSTERONE: CPT | Performed by: FAMILY MEDICINE

## 2021-04-24 LAB — TESTOST SERPL-MCNC: 97 NG/DL (ref 240–950)

## 2021-04-27 ENCOUNTER — VIRTUAL VISIT (OUTPATIENT)
Dept: FAMILY MEDICINE | Facility: CLINIC | Age: 31
End: 2021-04-27
Payer: COMMERCIAL

## 2021-04-27 DIAGNOSIS — F64.9 GENDER DYSPHORIA: Primary | ICD-10-CM

## 2021-04-27 DIAGNOSIS — E78.1 HYPERTRIGLYCERIDEMIA: ICD-10-CM

## 2021-04-27 PROCEDURE — 99207 PR NO CHARGE LOS: CPT | Mod: 95 | Performed by: STUDENT IN AN ORGANIZED HEALTH CARE EDUCATION/TRAINING PROGRAM

## 2021-04-27 RX ORDER — FENOFIBRATE 200 MG/1
200 CAPSULE ORAL
Qty: 90 CAPSULE | Refills: 3 | Status: SHIPPED | OUTPATIENT
Start: 2021-04-27 | End: 2022-01-27

## 2021-04-27 NOTE — PATIENT INSTRUCTIONS
"Here is the plan from today's visit    1. Gender dysphoria  I am glad to hear about your dysphoria being improved, even if we can't completely pin down why that is.  Remember that you are early in this \"second puberty\"phase and that your physical transition may not look or feel like everyone else's, that you are having some chest soreness intermittently as opposed to constantly is well within the range of normal.  Particularly some of the skin and hair effects can take a few months on a therapeutic dose to kick him so we can watch for that (sometimes most clearly in retrospect which I know is not hugely comforting to hear while we are in the moment) over the next few months.  + In masc folks who were transitioning we track testosterone levels in part because at high levels it is toxic to the heart and blood vessels.  It is not standard practice to do the same thing for estrogen.  For estrogen we mostly track effects, the \"number\" that helps this measure those effects is testosterone and how that is suppressed.  The baseline \" normal\" for men is around 240 to 950, especially given that your current level is significantly less than it was previously the estrogen is doing what we ask it to.  If in the next few months you are not seeing the degree of effects that you would like we can absolutely talk about adding an androgen blocker or adjusting your therapy.  + It is not likely that you have to be on estrogen patches for the rest of your life, the more we optimize the rest of your health, the more we reduce the risks associated with pill estrogen and the safer we are to switch to that format.  + Let me know if something goes wrong with the patches or you run out early and we can chat about getting you covered.  You are not stuck.    2. Hypertriglyceridemia  Your lipids are all within target range, congratulations just reiterating again that you have been so much work to get where you are and in addition to hearing how " you are feeling I am also seeing the numbers (since I know you get a lot of benefit from looking at numbers and tracking more objective data).  Keep up the good work!  Let us know how we can be supporting you.  - fenofibrate micronized (LOFIBRA) 200 MG capsule; Take 1 capsule (200 mg) by mouth every morning (before breakfast)  Dispense: 90 capsule; Refill: 3    Please call or return to clinic if your symptoms don't go away.    Follow up plan  I will see you in 1 month to follow-up and check in on how you are feeling, next lab draw in 2 months in June    Thank you for coming to [facility] today.  Lab Testing:  You will next be due for lab testing in early June.  Medication Refills:  If you need any refills please call your pharmacy and they will contact us.   If you need to  your refill at a new pharmacy, please contact the new pharmacy directly. The new pharmacy will help you get your medications transferred faster.   Scheduling:  If you have any concerns about today's visit or wish to schedule another appointment please call our office during normal business hours [phone] (8-5:00 M-F)  If a referral was made to a [facility] and you don't get a call from central scheduling please call [phone].     Medical Concerns:  If you have urgent medical concerns please call [phone] at any time of the day.    Sima Gilbert MD

## 2021-04-27 NOTE — PROGRESS NOTES
Ghazala Javed is a 31 year old who is being evaluated via a billable telephone visit.      What phone number would you like to be contacted at? [phone]  How would you like to obtain your AVS? Cinthia    Assessment & Plan     Gender dysphoria  We discussed her T level, is now in 97 from 355.  She is also reporting some increased emotional episodes (not detrimental to her mental health just noticeable), intermittent breast tenderness left greater than right, and for reasons difficult to pin down her dysphoria is not as severe.  She has not yet noticed a change in body hair or skin softness.  She is 1 month on her current dose at time of T draw.  We discussed whether or not add an androgen blocker, at this time she is feeling more comfortable with being on estrogen and some of her anxiety around this is resolved however it is not completely abated.  She is comfortable staying where she is now as we continue to watch for effects.  We discussed her feelings around the patches that she is finding some difficulty finding appropriate areas to stick them, she won't need to be on patches the rest of her life and we would be open to switching her to pills when the rest of her picture is more optimized.  She is done a lot of work to get where she has and over the next few months we will be transitioning into routine monitoring.   -In future if patient wants to add any androgen blocker I think this would be quite reasonable after we see the full effects of her current dose of estrogen.  -As her cardiovascular health is optimized, I would be comfortable switching her to estrogen pills if she so wishes in the future after a thorough risk benefits discussion    Hypertriglyceridemia  Reviewed hyperlipidemia labs collected last time, all lipids are within goal range.  Ghazala has been working very hard to get where she is and is showing significant results.  She will next be due for labs about the same time that she is due for her  "estrogen check, in 2 months.  - Refill the fenofibrate      Review of the result(s) of each unique test - T, hld  Independent interpretation of a test performed by another physician/other qualified health care professional (not separately reported) - T effects  Diagnosis or treatment significantly limited by social determinants of health - Hesitency with healthcare    BMI:   Estimated body mass index is 45.13 kg/m  as calculated from the following:    Height as of 2/4/21: 1.727 m (5' 8\").    Weight as of 2/10/21: 134.6 kg (296 lb 12.8 oz).   Weight management plan: Discussed healthy diet and exercise guidelines    FUTURE APPOINTMENTS:       - Follow-up visit in 2 month for HRT labs and check in       - Check in, in one month    Return in about 4 weeks (around 5/25/2021).    Sima Gilbert MD  [facility] LOLIS Javed is a 31 year old who presents for the following health issues:    HPI     Patient noticing some emotional irritability, more emotional overall but this may be due to pandemic stressors. Not unmanageable, feels better than the dysphoria and she is not acutely concerned. Noted one day of mild testicular pain that has no repeated. Is noticing some breast tenderness, L>R. Her dysphoria is better in some areas and she notices she's feeling better in many areas but that they are difficult to categorize.     Review of Systems   See above      Objective         Vitals:  No vitals were obtained today due to virtual visit.    Physical Exam   healthy, alert and no distress  PSYCH: Alert and oriented times 3; coherent speech, normal   rate and volume, able to articulate logical thoughts, able   to abstract reason, no tangential thoughts, no hallucinations   or delusions  Her affect is normal, pleasant and full  RESP: No cough, no audible wheezing, able to talk in full sentences  Remainder of exam unable to be completed due to telephone visits    Orders Only on " 04/22/2021   Component Date Value Ref Range Status     Testosterone Total 04/22/2021 97* 240 - 950 ng/dL Final    Comment: This test was developed and its performance characteristics determined by the   [facility].   It has not been cleared or approved by the FDA. The laboratory is regulated   under CLIA as qualified to perform high-complexity testing. This test is used   for clinical purposes. It should not be regarded as investigational or for   research.       Cholesterol 04/22/2021 155.3  0.0 - 200.0 mg/dL Final     Cholesterol/HDL Ratio 04/22/2021 3.7  0.0 - 5.0 Final     HDL Cholesterol 04/22/2021 42.0  >40.0 mg/dL Final     Triglycerides 04/22/2021 132.4  0.0 - 150.0 mg/dL Final     VLDL Cholesterol 04/22/2021 26.5  7.0 - 32.0 mg/dL Final     LDL Cholesterol Calculated 04/22/2021 87  0 - 129 mg/dL Final     Glucose Fasting 04/22/2021 99.0  51.0 - 109.0 mg/dL Final         Phone call duration:  1022 start  1054 END  32 minutes

## 2021-04-27 NOTE — PROGRESS NOTES
Preceptor Attestation:    I discussed the patient with the resident and evaluated the patient in person. I have verified the content of the note, which accurately reflects my assessment of the patient and the plan of care.   Supervising Physician:  Bambi Buchanan MD.

## 2021-04-28 ENCOUNTER — IMMUNIZATION (OUTPATIENT)
Dept: NURSING | Facility: CLINIC | Age: 31
End: 2021-04-28
Payer: COMMERCIAL

## 2021-04-28 PROCEDURE — 0001A PR COVID VAC PFIZER DIL RECON 30 MCG/0.3 ML IM: CPT

## 2021-04-28 PROCEDURE — 91300 PR COVID VAC PFIZER DIL RECON 30 MCG/0.3 ML IM: CPT

## 2021-05-08 ENCOUNTER — HEALTH MAINTENANCE LETTER (OUTPATIENT)
Age: 31
End: 2021-05-08

## 2021-05-19 ENCOUNTER — IMMUNIZATION (OUTPATIENT)
Dept: NURSING | Facility: CLINIC | Age: 31
End: 2021-05-19
Attending: FAMILY MEDICINE
Payer: COMMERCIAL

## 2021-05-19 PROCEDURE — 91300 PR COVID VAC PFIZER DIL RECON 30 MCG/0.3 ML IM: CPT

## 2021-05-19 PROCEDURE — 0002A PR COVID VAC PFIZER DIL RECON 30 MCG/0.3 ML IM: CPT

## 2021-06-08 ENCOUNTER — VIRTUAL VISIT (OUTPATIENT)
Dept: FAMILY MEDICINE | Facility: CLINIC | Age: 31
End: 2021-06-08
Payer: COMMERCIAL

## 2021-06-08 DIAGNOSIS — F64.9 GENDER DYSPHORIA: ICD-10-CM

## 2021-06-08 DIAGNOSIS — R80.9 TYPE 2 DIABETES MELLITUS WITH MICROALBUMINURIA, WITHOUT LONG-TERM CURRENT USE OF INSULIN (H): Primary | ICD-10-CM

## 2021-06-08 DIAGNOSIS — E78.1 HYPERTRIGLYCERIDEMIA: ICD-10-CM

## 2021-06-08 DIAGNOSIS — E11.29 TYPE 2 DIABETES MELLITUS WITH MICROALBUMINURIA, WITHOUT LONG-TERM CURRENT USE OF INSULIN (H): Primary | ICD-10-CM

## 2021-06-08 PROCEDURE — 99214 OFFICE O/P EST MOD 30 MIN: CPT | Mod: 95 | Performed by: STUDENT IN AN ORGANIZED HEALTH CARE EDUCATION/TRAINING PROGRAM

## 2021-06-08 RX ORDER — ESTRADIOL 0.1 MG/D
2 PATCH TRANSDERMAL WEEKLY
Qty: 24 PATCH | Refills: 1 | Status: SHIPPED | OUTPATIENT
Start: 2021-06-08 | End: 2022-01-27

## 2021-06-08 ASSESSMENT — ANXIETY QUESTIONNAIRES
7. FEELING AFRAID AS IF SOMETHING AWFUL MIGHT HAPPEN: NOT AT ALL
1. FEELING NERVOUS, ANXIOUS, OR ON EDGE: SEVERAL DAYS
3. WORRYING TOO MUCH ABOUT DIFFERENT THINGS: SEVERAL DAYS
IF YOU CHECKED OFF ANY PROBLEMS ON THIS QUESTIONNAIRE, HOW DIFFICULT HAVE THESE PROBLEMS MADE IT FOR YOU TO DO YOUR WORK, TAKE CARE OF THINGS AT HOME, OR GET ALONG WITH OTHER PEOPLE: SOMEWHAT DIFFICULT
5. BEING SO RESTLESS THAT IT IS HARD TO SIT STILL: NOT AT ALL
GAD7 TOTAL SCORE: 3
2. NOT BEING ABLE TO STOP OR CONTROL WORRYING: NOT AT ALL
6. BECOMING EASILY ANNOYED OR IRRITABLE: SEVERAL DAYS

## 2021-06-08 ASSESSMENT — PATIENT HEALTH QUESTIONNAIRE - PHQ9
SUM OF ALL RESPONSES TO PHQ QUESTIONS 1-9: 5
5. POOR APPETITE OR OVEREATING: NOT AT ALL

## 2021-06-08 NOTE — PROGRESS NOTES
Ghazala Javed is a 31 year old who is being evaluated via a billable telephone visit.      What phone number would you like to be contacted at? [phone]  How would you like to obtain your AVS? Cinthia    Assessment & Plan     Gender dysphoria  Ghazala is doing well, noticing breast development, softer and more sensitive skin, increased range of emotional mood, all of which are feeling good to her.  We discussed that we base a lot of our dosing of HRT on the effects that she is feeling and if she is feeling effects well and her testosterone is suppressed we do not necessarily need to track her estrogen levels as we are not aiming for levels.  She is reporting persistent discomfort and dysphoria around dark hair on her face or body, at her next visit we should start discussing hair removal options or referral to gender support for hair removal options.  She is noticing that her sexual arousal pattern has changed, that her erections are slower in onset and less firm, although at this time she is aware that there has been some mental changes around how she experiences arousal and orgasm.  She is concerned with atrophy and loss of sexual function over time with exposure to estrogen, I will do more reading on this topic and be ready to more thoroughly answer her questions next time I see her.  For now she is still able to achieve orgasm, she is not partnered at this time and does not want an acute referral for sexual functioning support.  We discussed switching to pills from patches for estrogen, I would want to wait until I have the rest of her labs back and we can do an in person visit and exam, check her blood pressure logs before we did this.  We discussed that other than a small increased in clotting risk relatively speaking the effects that she notices should be the same.  - estradiol (CLIMARA) 0.1 MG/24HR weekly patch; Place 2 patches onto the skin once a week  - Comprehensive metabolic panel; Future  - Lipid  panel reflex to direct LDL Fasting; Future  - Testosterone total; Future  - Hemoglobin; Future    Type 2 diabetes mellitus with microalbuminuria, without long-term current use of insulin (H)  Patient with type 2 diabetes, states that her sugars at home have been fairly well controlled.  She is taking her medication as directed, no changes or concerns.  She is due for recheck of her A1c and baseline labs.  I have put in for future lab orders, she can come in for a lab visit within the next few weeks and we will discuss the results at her next visit which should be in person in the middle of July.  - Comprehensive metabolic panel; Future  - Lipid panel reflex to direct LDL Fasting; Future  - Hemoglobin A1c; Future  - Albumin Random Urine Quantitative with Creat Ratio; Future    Hypertriglyceridemia  Patient is due for recheck of her lipids, she will come in for a lab only visit within the next few weeks and we will then follow-up on her results added in person visit in mid July.  - Lipid panel reflex to direct LDL Fasting; Future    Return in about 3 weeks (around 6/29/2021) for Call patient and schedule lab appt 2 weeks, in person appt 4 weeks..    Sima Gilbert MD  [facility] LOLIS Javed is a 31 year old who presents for the following health issues    HPI     1) Refill of estradiol patches, concerned they are running out of refill  2) Noticing less breast pain than previously, less aching but some increased nipple sensitivity. Noticing a little shape changes, pleased with the development and changes. Is a little anxious about weight loss impacting breast size.  3) Skin is softer, softer face skin that is also more sensitive. Still distressed by body hair.  4) Emotionally: easier to be emotionally effected by things, more weepy. Doesn't feel distressing, just noticing broader emotional range. Feel more access to their emotions, not as irritable. They have a therapist  and they are working through things well, excellent work on self awareness.   5) Noticing some changes in arousal, takes longer to get an erection and maintain. Unclear whether it's just head-space and arousal pattern      Review of Systems   See HPI      Objective           Vitals:  No vitals were obtained today due to virtual visit.    Physical Exam   healthy, alert and no distress  PSYCH: Alert and oriented times 3; coherent speech, normal   rate and volume, able to articulate logical thoughts, able   to abstract reason, no tangential thoughts, no hallucinations   or delusions  Her affect is normal, pleasant and full  RESP: No cough, no audible wheezing, able to talk in full sentences  Remainder of exam unable to be completed due to telephone visits      Start 1045  Phone call duration: 30 minutes

## 2021-06-08 NOTE — PATIENT INSTRUCTIONS
It was good to talk to you today, I appreciate your flexibility with scheduling and I was running late.    Going through your list of questions one by one, I have refilled your estradiol patches, you should be good for the next several months.  If there is ever any question or problem please reach out to me and I will get it fixed.    It is normal to notice different breast sensations, that you are noticing some change in shape is great and noticing some changes in nipple sensitivity or even color are very normal.  If you are noticing changes in shape this suggest that you are having changes in the type of breast tissue that is developing and even as you continue to work on physical fitness and weight loss I anticipate that you will still have noticeable changes to your chest.    I think this is the second time you have noticed being distressed by body hair, if you like your next visit we can start looking at hair removal and hair thinning options, seeing what referrals we can get for you, discuss laser or other measures.  Just let me know when you when you start having that conversation.    I am glad you and your therapist are working well together, having a broader emotional range in depth is a really common effect with estrogen.  You have done a lot of work on your self, your self-awareness, and you were very good at noticing how medications and situations affect your mind and your body.  Let us know if you need further support from us in this arena.    Regarding your changes in arousal, I will reach out to the docs we have here and to do some more reading and have more information next time I see you.  For now since it sounds as you are able to become aroused and reach orgasm, there is nothing urgent or emergent to do.  We can treat difficulty maintaining an erection in women the same way that we do with everyone else so there are treatment options available to you that do not involve changing your hormone  treatments if that becomes something that you are interested in her something that you feel would improve her quality of life.      I have requested for the  to call you and schedule a lab appointment in about 2 weeks, and then an in person appointment about 4 weeks from now at which time we will go over your lab results and check in in person.  When you come to that please bring your sugar log and your blood pressure log.    Good luck staying cool, take care of yourself and I will see you in a month.  Reach out to me if you have any other questions or concerns.

## 2021-06-08 NOTE — PROGRESS NOTES
Preceptor Attestation:   I discussed the patient with the resident. I talked to the patient on the phone. I have verified the content of the note, which accurately reflects my assessment of the patient and the plan of care.   Supervising Physician:  Stephany Santillan MD.

## 2021-06-09 ASSESSMENT — ANXIETY QUESTIONNAIRES: GAD7 TOTAL SCORE: 3

## 2021-08-11 ENCOUNTER — LAB (OUTPATIENT)
Dept: LAB | Facility: CLINIC | Age: 31
End: 2021-08-11
Payer: COMMERCIAL

## 2021-08-11 DIAGNOSIS — E11.29 TYPE 2 DIABETES MELLITUS WITH MICROALBUMINURIA, WITHOUT LONG-TERM CURRENT USE OF INSULIN (H): ICD-10-CM

## 2021-08-11 DIAGNOSIS — E78.1 HYPERTRIGLYCERIDEMIA: ICD-10-CM

## 2021-08-11 DIAGNOSIS — R80.9 TYPE 2 DIABETES MELLITUS WITH MICROALBUMINURIA, WITHOUT LONG-TERM CURRENT USE OF INSULIN (H): ICD-10-CM

## 2021-08-11 DIAGNOSIS — F64.9 GENDER DYSPHORIA: Primary | ICD-10-CM

## 2021-08-11 DIAGNOSIS — F64.9 GENDER DYSPHORIA: ICD-10-CM

## 2021-08-11 LAB
ALBUMIN SERPL-MCNC: 3.6 G/DL (ref 3.4–5)
ALP SERPL-CCNC: 32 U/L (ref 40–150)
ALT SERPL W P-5'-P-CCNC: 25 U/L (ref 0–70)
ANION GAP SERPL CALCULATED.3IONS-SCNC: 6 MMOL/L (ref 3–14)
AST SERPL W P-5'-P-CCNC: 17 U/L (ref 0–45)
BILIRUB SERPL-MCNC: 0.4 MG/DL (ref 0.2–1.3)
BUN SERPL-MCNC: 11 MG/DL (ref 7–30)
CALCIUM SERPL-MCNC: 8.9 MG/DL (ref 8.5–10.1)
CHLORIDE BLD-SCNC: 105 MMOL/L (ref 94–109)
CHOLEST SERPL-MCNC: 160 MG/DL
CO2 SERPL-SCNC: 26 MMOL/L (ref 20–32)
CREAT SERPL-MCNC: 0.87 MG/DL (ref 0.52–1.25)
CREAT UR-MCNC: 237 MG/DL
FASTING STATUS PATIENT QL REPORTED: YES
GFR SERPL CREATININE-BSD FRML MDRD: >90 ML/MIN/1.73M2
GLUCOSE BLD-MCNC: 89 MG/DL (ref 70–99)
HBA1C MFR BLD: 5.3 % (ref 0–5.6)
HDLC SERPL-MCNC: 31 MG/DL
HGB BLD-MCNC: 13.4 G/DL
LDLC SERPL CALC-MCNC: 89 MG/DL
MICROALBUMIN UR-MCNC: 132 MG/L
MICROALBUMIN/CREAT UR: 55.7 MG/G CR (ref 0–25)
NONHDLC SERPL-MCNC: 129 MG/DL
POTASSIUM BLD-SCNC: 4.5 MMOL/L (ref 3.4–5.3)
PROT SERPL-MCNC: 7.8 G/DL (ref 6.8–8.8)
SODIUM SERPL-SCNC: 137 MMOL/L (ref 133–144)
TRIGL SERPL-MCNC: 198 MG/DL

## 2021-08-11 PROCEDURE — 82043 UR ALBUMIN QUANTITATIVE: CPT

## 2021-08-11 PROCEDURE — 36415 COLL VENOUS BLD VENIPUNCTURE: CPT

## 2021-08-11 PROCEDURE — 83036 HEMOGLOBIN GLYCOSYLATED A1C: CPT

## 2021-08-11 PROCEDURE — 84403 ASSAY OF TOTAL TESTOSTERONE: CPT

## 2021-08-11 PROCEDURE — 80053 COMPREHEN METABOLIC PANEL: CPT

## 2021-08-11 PROCEDURE — 85018 HEMOGLOBIN: CPT

## 2021-08-11 PROCEDURE — 80061 LIPID PANEL: CPT

## 2021-08-14 LAB — TESTOST SERPL-MCNC: 108 NG/DL (ref 8–950)

## 2021-09-08 ENCOUNTER — OFFICE VISIT (OUTPATIENT)
Dept: FAMILY MEDICINE | Facility: CLINIC | Age: 31
End: 2021-09-08
Payer: COMMERCIAL

## 2021-09-08 VITALS
OXYGEN SATURATION: 97 % | WEIGHT: 293 LBS | HEART RATE: 92 BPM | RESPIRATION RATE: 16 BRPM | HEIGHT: 68 IN | TEMPERATURE: 99.5 F | BODY MASS INDEX: 44.41 KG/M2 | DIASTOLIC BLOOD PRESSURE: 82 MMHG | SYSTOLIC BLOOD PRESSURE: 124 MMHG

## 2021-09-08 DIAGNOSIS — I10 ESSENTIAL HYPERTENSION: ICD-10-CM

## 2021-09-08 DIAGNOSIS — R80.9 TYPE 2 DIABETES MELLITUS WITH MICROALBUMINURIA, WITHOUT LONG-TERM CURRENT USE OF INSULIN (H): ICD-10-CM

## 2021-09-08 DIAGNOSIS — E11.29 TYPE 2 DIABETES MELLITUS WITH MICROALBUMINURIA, WITHOUT LONG-TERM CURRENT USE OF INSULIN (H): ICD-10-CM

## 2021-09-08 DIAGNOSIS — E78.1 HYPERTRIGLYCERIDEMIA: ICD-10-CM

## 2021-09-08 DIAGNOSIS — F64.9 GENDER DYSPHORIA: Primary | ICD-10-CM

## 2021-09-08 PROCEDURE — 99214 OFFICE O/P EST MOD 30 MIN: CPT | Mod: GC | Performed by: STUDENT IN AN ORGANIZED HEALTH CARE EDUCATION/TRAINING PROGRAM

## 2021-09-08 ASSESSMENT — MIFFLIN-ST. JEOR: SCORE: 2379.19

## 2021-09-08 NOTE — PROGRESS NOTES
Assessment & Plan     Gender dysphoria  Patient with stable dose of estrogen, using patches that are fixed to the abdomen (dislocation is going best although it is only been a week or so since finally finding this location that seems to work well, will continue to workshop).  Previously being on multiple high risk categories for DVT or VTE prevented use of higher potency higher concentration of estrogen, however is health continues to improve we can reexamine changing to a different form of estrogen for further testosterone suppression as needed.  Right now she is noticing that she is having jeans that fit differently, had breast tenderness this is resolved although she is not sure what degree she has fat distribution versus new breast tissue, has noticed skin changes.  Has also noticed change in erections, does not have dysphoria around genitals, is worried about loss of ability to have erections.  We discussed change in erection orgasm connection, change in arousal patterns, change in sex drive, change in texture and type of erections as well as the options for ED medications or other treatments later.  Reassured patient will consider what they want and reach out as needed.  We will follow up with repeat labs in 3 months including estrogen level.    Type 2 diabetes mellitus with microalbuminuria, without long-term current use of insulin (H)  Essential hypertension  Hypertriglyceridemia  Hyperlipidemia  Labs from August are generally looking much better, continue to show improvement or stability across the board.  We discussed transitioning from a crunch style, goal oriented weight loss dieting to maintenance level relationship with food and activity.  Patient is figuring out how to eat well while not allowing food and body to become a source of obsessive focus or pathologic fixation.  We will recheck labs in 3 months, continue to work on stabilizing eating habits, reintegrating exercise after starting a job were  "she is on her feet for over 6 hours in the day.    Return in about 3 months (around 12/8/2021) for Lab Work, In-Clinic Visit, Hgb A1c, Thank you kindly.    Sima Gilbert MD  [facility] LOLIS Duncan is a 31 year old who presents for the following health issues    HPI     + Lab result discussion  Worried about elevated albumin to creatinine ratio in urine  Notes stable testosterone around 108, wondering if there is something else we should be doing right now    +HRT  Has found stable placement for patches on abdomen, has been doing research at home as to what types of locations are best for adherence and absorption  Is noticing body shape changes, even though there has been an increase in weight over the summer    +General  Is working now at the [employer], on feet for 6 hours and doing well  Dedicated workout times decreased with work  Did have a slip in diet control, feels is getting back on the right track  Taking medications as prescribed no questions or concerns    Review of Systems   HPI      Objective    /82   Pulse 92   Temp 99.5  F (37.5  C) (Oral)   Resp 16   Ht 1.733 m (5' 8.23\")   Wt 144.6 kg (318 lb 12.8 oz)   SpO2 97%   BMI 48.15 kg/m    Body mass index is 48.15 kg/m .  Physical Exam   Alert and appropriate human appears stated age  Pulm breathing easily on room air  HEENT warm and dry    Lab on 08/11/2021   Component Date Value Ref Range Status     Hemoglobin 08/11/2021 13.4  g/dL Final     Hemoglobin A1C 08/11/2021 5.3  0.0 - 5.6 % Final    Normal <5.7%   Prediabetes 5.7-6.4%    Diabetes 6.5% or higher     Note: Adopted from ADA consensus guidelines.     Testosterone Total 08/11/2021 108  8 - 950 ng/dL Final     Cholesterol 08/11/2021 160  <200 mg/dL Final    Age 0-19 years  Desirable: <170 mg/dL  Borderline high:  170-199 mg/dl  High:            >199 mg/dl    Age 20 years and older  Desirable: <200 mg/dL     Triglycerides 08/11/2021 198* " <150 mg/dL Final    0-9 years:  Normal:    Less than 75 mg/dL  Borderline high:  75-99 mg/dL  High:             Greater than or equal to 100 mg/dL    0-19 years:  Normal:    Less than 90 mg/dL  Borderline high:   mg/dL  High:             Greater than or equal to 130 mg/dL    20 years and older:  Normal:    Less than 150 mg/dL  Borderline high:  150-199 mg/dL  High:             200-499 mg/dL  Very high:   Greater than or equal to 500 mg/dL     Direct Measure HDL 08/11/2021 31* >=40 mg/dL Final    0-19 years:       Greater than or equal to 45 mg/dL   Low: Less than 40 mg/dL   Borderline low: 40-44 mg/dL     20 years and older:   Female: Greater than or equal to 50 mg/dL   Male:   Greater than or equal to 40 mg/dL          LDL Cholesterol Calculated 08/11/2021 89  <=100 mg/dL Final    Age 0-19 years:  Desirable: 0-110 mg/dL   Borderline high: 110-129 mg/dL   High: >= 130 mg/dL    Age 20 years and older:  Desirable: <100mg/dL  Above desirable: 100-129 mg/dL   Borderline high: 130-159 mg/dL   High: 160-189 mg/dL   Very high: >= 190 mg/dL     Non HDL Cholesterol 08/11/2021 129  <130 mg/dL Final    0-19 years:  Desirable:          Less than 120 mg/dL  Borderline high:   120-144 mg/dL  High:                   Greater than or equal to 145 mg/dL    20 years and older:  Desirable:          130 mg/dL  Above Desirable: 130-159 mg/dL  Borderline high:   160-189 mg/dL  High:               190-219 mg/dL  Very high:     Greater than or equal to 220 mg/dL     Patient Fasting > 8hrs? 08/11/2021 Yes   Final     Sodium 08/11/2021 137  133 - 144 mmol/L Final     Potassium 08/11/2021 4.5  3.4 - 5.3 mmol/L Final     Chloride 08/11/2021 105  94 - 109 mmol/L Final    0-20 years:       Female:  mmol/L  Male:    mmol/L       20 years and older:   Female:  mmol/L   Male:    mmol/L       Carbon Dioxide (CO2) 08/11/2021 26  20 - 32 mmol/L Final     Anion Gap 08/11/2021 6  3 - 14 mmol/L Final     Urea Nitrogen  08/11/2021 11  7 - 30 mg/dL Final    Female  0 to 15 days           3-23  15 days to 1 year      3-17  1 to 10 years          9-22  10 to 19 years         7-19  19 years and older     7-30    Male  0 to 15 days           3-23  15 days to 1 year      3-17  1 to 10 years          9-22  10 to 19 years         7-21  19 years and older     7-30     Creatinine 08/11/2021 0.87  0.52 - 1.25 mg/dL Final    20 y and older Female 0.52-1.04 mg/dL  20 y and older Male 0.66-1.25 mg/dL    Varies with the amount of muscle mass present.    GICH  Female: 0.6-1.2 mg/dL  Male: 0.7-1.3 mg/dL       Calcium 08/11/2021 8.9  8.5 - 10.1 mg/dL Final     Glucose 08/11/2021 89  70 - 99 mg/dL Final     Alkaline Phosphatase 08/11/2021 32* 40 - 150 U/L Final    Female:    0-3 years  110-320 U/L  4-9 years  150-420 U/L  10-11 years  130-560 U/L  12-13 years  105-420 U/L  14-15 years   U/L  16 years and older   U/L      Male:  0-3 years  110-320 U/L  4-9 years  150-420 U/L  10-15 years  130-530 U/L  16-19 years   U/L  20 years and older   U/L       AST 08/11/2021 17  0 - 45 U/L Final     ALT 08/11/2021 25  0 - 70 U/L Final    Female    All ages 0-50 U/L    Male    0 - 19 years       0-50 U/L  20 years and older 0-70 U/L            Protein Total 08/11/2021 7.8  6.8 - 8.8 g/dL Final     Albumin 08/11/2021 3.6  3.4 - 5.0 g/dL Final     Bilirubin Total 08/11/2021 0.4  0.2 - 1.3 mg/dL Final     GFR Estimate 08/11/2021 >90  >60 mL/min/1.73m2 Final    GFR not calculated when sex unspecified or nonbinary.  As of July 11, 2021, eGFR is calculated by the CKD-EPI creatinine equation, without race adjustment. eGFR can be influenced by muscle mass, exercise, and diet. The reported eGFR is an estimation only and is only applicable if the renal function is stable.     Creatinine Urine mg/dL 08/11/2021 237  mg/dL Final     Albumin Urine mg/L 08/11/2021 132  mg/L Final     Albumin Urine mg/g Cr 08/11/2021 55.70* 0.00 - 25.00 mg/g Cr Final     0-17 y             0-25 mg/g creatinine  18 y and older, Female 0-25 mg/g creatinine  18 y and older, Male 0-17 mg/g creatinine

## 2021-09-14 ENCOUNTER — TELEPHONE (OUTPATIENT)
Dept: DERMATOLOGY | Facility: CLINIC | Age: 31
End: 2021-09-14

## 2021-09-14 ENCOUNTER — VIRTUAL VISIT (OUTPATIENT)
Dept: DERMATOLOGY | Facility: CLINIC | Age: 31
End: 2021-09-14
Payer: COMMERCIAL

## 2021-09-14 DIAGNOSIS — F64.9 GENDER DYSPHORIA: Primary | ICD-10-CM

## 2021-09-14 PROCEDURE — 99203 OFFICE O/P NEW LOW 30 MIN: CPT | Mod: 95 | Performed by: DERMATOLOGY

## 2021-09-14 NOTE — PATIENT INSTRUCTIONS
Laser Hair Removal     ? What is it?  Laser hair removal is a medical treatment that uses light to reduce hair. It works by targeting the  pigment (melanin) in darker hair.    ? Is it permanent?  No. Laser hair removal may not be permanent. Hair may regrow years later, but it tends to be  less, thinner, and paler than before treatment.    ? Does it work on blonde or white hair?  No. Laser hair removal will not work for blonde or white hair. You will need electrolysis (a  treatment that uses radio waves to stop hair growth) for any blonde or white hair.    ? How many treatments do I need?  We suggest 6 to 12 treatments (performed every 3-6 weeks).    ? What are the risks?  I will have redness, pain and swelling. I may have skin discoloration, bruising and itching. Risks  are permanent discoloration, hives, infection, scarring, eye injury, hair growth, and blisters. The  outcome could be no improvement or slight improvement. Multiple treatments are required. All  hair will not be removed.    ? Will my insurance pay for this?  We will work with you to try to get insurance coverage. After your visit, we will send a letter to  your insurance through our financial counselors to check coverage. If you do NOT hear from us  in two weeks, please contact us via [employer] or telephone. For more details on insurance  coverage, see the diagram below.    Insurance Workflow        Contact Information  ? [facility]  [address], [place] [address]  Phone Number: [phone]    ? [address], [address] [facility], [place] [address]  Phone Number: [phone]    ? Transgender Intake & Referral Coordinator for insurance denial navigation:  Phone Number: [phone]    ? For urgent needs outside of business hours: Call [phone] and ask for the  dermatology resident on call.    ? What should I do BEFORE treatment?    6 weeks before treatment  ?  Keep your treatment area out of the sun. Do NOT tan the removal area or come in for  treatment with a tan. Tans increase the risks of treatment. Do NOT use spray tan or any  over-the-counter self-tanners before treatment.  ? Stop waxing, plucking, electrolysis and other laser treatments.  ? Stop any other cosmetic treatments to the area, such as chemical peels or  Dermabrasion.    1 week before treatment  ? Do NOT use retinols, such as adapalene (Differin) and tretinoin (Retin-A), on the  treatment area.  ? Do NOT use any bleaching creams, such as hydroquinone, on the treatment area.    The night before treatment  ? Gently shave the treatment area.

## 2021-09-14 NOTE — TELEPHONE ENCOUNTER
FC - please submit letter from Dr. Tolentino dated 9/14/21 to insurance.    Financial Counselor Review:    Procedure to be performed (include CPT code):  Laser hair removal, [address]  Diagnosis:  Gender dyshporia  ICD-10 code:  F64.9  # of treatments requested:  12    Ana Palomares RN

## 2021-09-14 NOTE — LETTER
"September 14, 2021        Re: Osman \"Perla\" MICH Castellano  [address] [place] [address] [address]  [facility] [address]        To Whom This May Concern,     We are writing to request coverage of laser hair removal of the face and neck for Perla as part of the treatment of gender dysphoria, which is medically necessary.       We would expect 1 treatment every 3-8 weeks for a maximum of 12 treatments.     The CPT Code Description utilized would be 62723: Unlisted procedure, skin, mucous membrane and subcutaneous tissue.  The price is $245 per treatment.     Perla is a patient of mine who was assigned a sex of male at birth with a female gender identity. Perla has socially transitioned and has been maintained on a feminizing hormone therapy regimen consisting of estradiol. The goal of feminizing hormone therapy is to treat gender dysphoria by helping to develop feminine secondary sex characteristics, which includes thinning of body and facial hair.     However, despite adherence to this regimen, there are some masculine features that cannot be overcome with hormonal intervention alone once puberty has taken place.  Facial and body hair may thin out and have slower regrowth with feminizing hormones, but it does not go away entirely using hormones only.  Subsequently, Perla continues to experience significant gender dysphoria from the persistent growth of hair on the face and neck despite feminizing hormone therapy.     Laser hair removal is thus medically necessary for the treatment of gender dysphoria for Perla. This procedure is medically necessary, based on standard definitions of medical necessity as used by insurers (Ref 1) because it (1) is in accordance with generally accepted standards of medical practice and supported by evidence-based recommendations, (2) clinically appropriate and considered medical necessary and effective for the treatment of gender dysphoria, and (3) not intended for the \"convenience\" or " "\"cosmetic\" benefit of the patient.     In fact, studies have found that facial hair removal is the most desired gender-affirming procedure to treat gender dysphoria among transfeminine individuals, more important than facial feminization surgery, breast augmentation, or vaginoplasty (Ref 2). This is partially because facial areas are difficult to conceal in everyday interactions and there are no effective, long-term treatment options for facial hair removal besides laser hair removal or electrolysis. Furthermore, gender-affirming facial procedures have been demonstrated to improve quality of life in transfeminine patients experiencing gender dysphoria (Ref 3 & 4). Transwomen are at high risk of becoming victims of violence and the more their outer physical appearance aligns with their gender identity, the safer they are in our society. Perla's gender presentation is female with the exception of this continued regrowth of hair in areas that are not associated with the female gender.       Leading international and national expert groups also support the medical necessity of laser hair removal for transfeminine individuals:     1. The World Professional Association for Transgender Health (WPATH) is an international, interdisciplinary, professional association devoted to the understanding and treatment of individuals with Gender Dysphoria (GD). According to the WPATH standards of care (version 7) facial hair removal is designated as a \"medically necessary\" treatment for gender dysphoria and is not \"cosmetic\" or \"elective\" or \"for the mere convenience of the patient.\" (Ref 5). Professional associations that have issued statements in support of the WPATH Standards of Care include the American Medical Association, the Endocrine Society, the American Psychiatric Association, the American Psychological Association, the American Academy of Family Physicians, the National Commission of Correctional Health Care, the " "American Public Health Association, the National Association of Social Workers, the American College of Obstetrics and Gynecology, the American Society of Plastic Surgeons, and the World Health Organization.     2. The American Academy of Dermatology (AAD) is the largest dermatology professional society in the [place] and represents over 19,000 dermatologists. The AAD supports \"evidence-based coverage of all gender-affirming therapy and procedures which help the mental and physical well-being of gender diverse individuals\" and \"recognizes that gender-affirming procedures and treatments are not \"cosmetic\" or \"elective\" or for the mere convenience of the patient. These procedures are not optional in any meaningful sense, but are understood ?to be medically necessary for the health and well-being of the individual.\" (Ref 6).     Thus, it is our belief that laser hair removal is medically necessary and should be covered under [place]'s health insurance policy.     We strive to provide our patient with outstanding care. Therefore, I request you please contact me at [phone] if you have any questions regarding coverage or our treatment rationale.     Philip Tolentino MD      Department of Dermatology   [facility]: Phone: [phone], Fax:[phone]   [facility]: Phone: [phone], Fax: [phone]     References     1. Adapted from definition from [facility]. Available www.[email or web address].   2. \"A potential role for the dermatologist in the physical transformation of transgender people: A survey of attitudes and practices within the transgender community.\"  J Am Acad Dermatol. 2016 Feb;74(2):303-8. doi: 10.1016/j.jaad.2015.10.013. Epub 2015 Dec 5.   3. Luzmaria Yeboah, and Darin Baeza. \"Quality of life of individuals with and without facial " "feminization surgery or gender reassignment surgery.\" Quality of Life Research 19.7 (2010): 7728-7505.   4. Facial gender confirmation surgery-review of the literature and recommendations for Version 8 of the WPATH Standards of Care. Available https://www.[employer]/doi/full/10.1080/46993880.2017.2902840   5. WPATH Position Statement on Medical Necessity of Gender Affirming Procedures. Available https://www.wpath.org/media/cms/Documents/Web%20Transfer/Policies/XGWPV-Pnarutjg-ho-Medical-Necessity-12-.pdf   6. AAD Position Statement on Sexual and Gender Minority Health in Dermatology. Available https://www.aad.org/dw/dw-weekly/obp-nxmbzf-bpkkvapx-statement-on-sexual-and-gender-minority-health     "

## 2021-09-14 NOTE — LETTER
9/14/2021         RE: Osman Castellano  [address]  [facility] [address]        Dear Colleague,    Thank you for referring your patient, Osman Castellano, to the [facility]. Please see a copy of my visit note below.    [facility] Dermatology Note  Telephone visit with photos  Start Time: 10:45 AM  End Time: 10:55 AM    Dermatology Problem List:  1. Gender dysphoria, pending LHR on the face/neck   - LHR PA sent 9/14/21    Encounter Date: Sep 14, 2021    CC:   Chief Complaint   Patient presents with     Laser Consultation     LHR consult for face and neck.  Patient has not had any electrolysis or LHR done in the past.       SO Data:  Sex Assigned at Birth: Male  Gender Identity: Transgender Female / Male-to-Female  Pronouns: she/her/hers    History of Present Illness:  Ms. Osman Castellano is a 31 year old adult who presents for evaluation of laser hair removal on the face and neck.     Patient is transgender female who was assigned a sex a male sex at birth. The patient has been maintained on a feminizing hormone therapy consistent of estradiol for the last 7 months. Dr. Collado manages the patient's hormone therapy. Despite this, the patient reports continue facial hair growth on the face and neck. The patient reports significant gender dysphoria from continued hair growth on the face and neck. The patient is interested in pursuing laser hair removal to better align her gender identity with their physical appearance.     Health is otherwise stable. No other skin concerns.      Dermatology Safety Transgender Patient Intake:  Is the patient pending upcoming gender affirmation surgery: No  IF yes, what type of surgery is the upcoming expected affirmation surgery(phalloplasty/vaginoplasty/facial surgery/NA)? None.   Has the patient met with plastic surgeon prior to this visit: No  What is the goal date of the upcoming expected affirmation surgery (date/  NA)? N/A  Is the patient on hormonal therapy(if yes list what type (estrogen/dimas/estradiol or testosoterone) and length of time treated):  Yes, estradiol    Is the patient here for laser hair removal assessment:  Yes  Has the patient had laser hair removal in the past?  No.   If, so, how many approximate weeks/months/years prior: N/A  Was insurance coverage pursued for laser hair removal procedure (Yes/no/pending)?  N/A  Was insurance coverage obtained (yes/no/pending)?  N/A    Has the patient ever had any other minimally invasive cosmetic procedures such as the following:  Electrolysis:  No  Botulinum toxin:  No  Fillers (list filler type and location):  No  Chemical peels: No  Lasers: No  Intense pulsed light: No  Broad band light: No  Microneedling: No  Has the patient every had any unknown injectable products or other injectable products? (If yes, please list details if available):  If any minimally invasive  cosmetic procedures performed prior to presentation to our clinic were they performed by a olivia MD/ NP/RN/ other or richa:N/A    Has the patient had any prior surgeries as follows:  Breast augmentation:  No  Genital:  No  Face:  No  Hair transplant:  No    Past Medical History:   Patient Active Problem List   Diagnosis     CARDIOVASCULAR SCREENING; LDL GOAL LESS THAN 160     Gender dysphoria     Family history of diabetes mellitus     Type 2 diabetes mellitus with microalbuminuria, without long-term current use of insulin (H)     Class 3 severe obesity due to excess calories with serious comorbidity and body mass index (BMI) of 45.0 to 49.9 in adult (H)     Essential hypertension     Hypertriglyceridemia     Past Medical History:   Diagnosis Date     Anxiety      Depressive disorder      Environmental allergies     flare-up in fall     Gender dysphoria in adult      Past Surgical History:   Procedure Laterality Date     TONSILLECTOMY, ADENOIDECTOMY, MYRINGOTOMY, INSERT TUBE BILATERAL, COMBINED       age 5     wisdom teeth      2015       Social History:  Patient reports that she has never smoked. She has never used smokeless tobacco. She reports previous alcohol use. She reports that she does not use drugs.    Family History:  Family History   Problem Relation Age of Onset     Hypertension Mother      Diabetes Type 2  Mother      Deep Vein Thrombosis No family hx of        Medications:  Current Outpatient Medications   Medication Sig Dispense Refill     blood glucose (NO BRAND SPECIFIED) lancets standard Use to test blood sugar 2 times daily or as directed. 100 each 11     blood glucose (NO BRAND SPECIFIED) test strip Use to test blood sugar 2 times daily or as directed. 100 each 11     blood glucose monitoring (NO BRAND SPECIFIED) meter device kit Use to test blood sugar 2 times daily or as directed. 1 kit 0     blood glucose monitoring (SOFTCLIX) lancets 1 each 2 times daily       Blood Glucose Monitoring Suppl (ACCU-CHEK GUIDE) w/Device KIT Inject 1 Units Subcutaneous 2 times daily       cholecalciferol (VITAMIN D3) 125 mcg (5000 units) capsule Take 125 mcg by mouth twice a week       estradiol (CLIMARA) 0.1 MG/24HR weekly patch Place 2 patches onto the skin once a week 24 patch 1     fenofibrate micronized (LOFIBRA) 200 MG capsule Take 1 capsule (200 mg) by mouth every morning (before breakfast) 90 capsule 3     lisinopril (ZESTRIL) 10 MG tablet Take 1 tablet (10 mg) by mouth daily 90 tablet 3     melatonin 3 MG tablet Take 5 mg by mouth nightly as needed for sleep        metFORMIN (GLUCOPHAGE-XR) 500 MG 24 hr tablet Take 2 tablets (1,000 mg) by mouth 2 times daily 360 tablet 3        Allergies   Allergen Reactions     Ceclor [Cefaclor]          Review of Systems:  -As per HPI  -Constitutional: Otherwise feeling well today, in usual state of health.  -HEENT: Patient denies nonhealing oral sores.  -Skin: As above in HPI. No additional skin concerns.    Physical exam:  Vitals: There were no vitals taken for  this visit.  GEN: This is a well developed, well-nourished female in no acute distress, in a pleasant mood.    SKIN: Telemedicine photographs reviewed   -Barba skin type: II  -Dark brown to black terminal pigmented hairs on the bilateral neck, chin, upper lip in beard distribution.   -No other lesions of concern on areas examined.                 Impression/Plan:    1. Gender dysphoria, plan for laser hair removal on the face and neck, which is medically necessary.    Barba skin type II. Recommend the 755 laser.     Location: Area planned for treatment and reviewed with patient is for face and neck.    Reviewed each treatment will target existing hair bulbs, but to treat all hair multiple treatments will be needed. Patient informed laser hair removal is not permenant and hair may regrow years later. Reviewed this will not work for blond or white hair. Any blonde or white hair will need electrolysis. Reviewed that home laser units may not be as efficacious.     Shaving hair prior to LHR will improve efficacy. Any plucking, waxing, electrolysis, or needs to be avoided at least 4 weeks before LHR. Adhere to strict sun avoidance for a minimum of 6 weeks before and after each treatment.     Electrolysis was offered as an alternative, especially for light hairs that may not response to laser hair removal. We would need to refer outside our sytem.     Hand-out provided on laser hair removal, including insurance approval process and contact information in the case of questions or concerns.     Patient was confirmed to not be using topical minoxidil in treatment areas.     We will initiate a letter to insurance to check for insurance coverage by messaging the [employer] derm pool.     Other notes for laser hair removal team:  None.      The patient will send a [employer] message in 2 weeks to check status of coverage.     Follow-up pending insurance approval.     Staff Involved:  Philip Tolentino MD  Pronouns:  he/him/his    Department of Dermatology  [facility]: Phone: [phone], Fax:[phone]  [facility] [place]: Phone: [phone] Fax: [phone]        Again, thank you for allowing me to participate in the care of your patient.        Sincerely,        Philip Tolentino MD

## 2021-09-14 NOTE — PROGRESS NOTES
[facility] Dermatology Note  Telephone visit with photos  Start Time: 10:45 AM  End Time: 10:55 AM    Dermatology Problem List:  1. Gender dysphoria, pending LHR on the face/neck   - LHR PA sent 9/14/21    Encounter Date: Sep 14, 2021    CC:   Chief Complaint   Patient presents with     Laser Consultation     LHR consult for face and neck.  Patient has not had any electrolysis or LHR done in the past.       SOGI Data:  Sex Assigned at Birth: Male  Gender Identity: Transgender Female / Male-to-Female  Pronouns: she/her/hers    History of Present Illness:  Ms. Osman Castellano is a 31 year old adult who presents for evaluation of laser hair removal on the face and neck.     Patient is transgender female who was assigned a sex a male sex at birth. The patient has been maintained on a feminizing hormone therapy consistent of estradiol for the last 7 months. Dr. Collado manages the patient's hormone therapy. Despite this, the patient reports continue facial hair growth on the face and neck. The patient reports significant gender dysphoria from continued hair growth on the face and neck. The patient is interested in pursuing laser hair removal to better align her gender identity with their physical appearance.     Health is otherwise stable. No other skin concerns.      Dermatology Safety Transgender Patient Intake:  Is the patient pending upcoming gender affirmation surgery: No  IF yes, what type of surgery is the upcoming expected affirmation surgery(phalloplasty/vaginoplasty/facial surgery/NA)? None.   Has the patient met with plastic surgeon prior to this visit: No  What is the goal date of the upcoming expected affirmation surgery (date/ NA)? N/A  Is the patient on hormonal therapy(if yes list what type (estrogen/dimas/estradiol or testosoterone) and length of time treated):  Yes, estradiol    Is the patient here for laser hair removal assessment:  Yes  Has the patient had laser hair removal in  the past?  No.   If, so, how many approximate weeks/months/years prior: N/A  Was insurance coverage pursued for laser hair removal procedure (Yes/no/pending)?  N/A  Was insurance coverage obtained (yes/no/pending)?  N/A    Has the patient ever had any other minimally invasive cosmetic procedures such as the following:  Electrolysis:  No  Botulinum toxin:  No  Fillers (list filler type and location):  No  Chemical peels: No  Lasers: No  Intense pulsed light: No  Broad band light: No  Microneedling: No  Has the patient every had any unknown injectable products or other injectable products? (If yes, please list details if available):  If any minimally invasive  cosmetic procedures performed prior to presentation to our clinic were they performed by a olivia LOUIE/ NP/RN/ other or richa:N/A    Has the patient had any prior surgeries as follows:  Breast augmentation:  No  Genital:  No  Face:  No  Hair transplant:  No    Past Medical History:   Patient Active Problem List   Diagnosis     CARDIOVASCULAR SCREENING; LDL GOAL LESS THAN 160     Gender dysphoria     Family history of diabetes mellitus     Type 2 diabetes mellitus with microalbuminuria, without long-term current use of insulin (H)     Class 3 severe obesity due to excess calories with serious comorbidity and body mass index (BMI) of 45.0 to 49.9 in adult (H)     Essential hypertension     Hypertriglyceridemia     Past Medical History:   Diagnosis Date     Anxiety      Depressive disorder      Environmental allergies     flare-up in fall     Gender dysphoria in adult      Past Surgical History:   Procedure Laterality Date     TONSILLECTOMY, ADENOIDECTOMY, MYRINGOTOMY, INSERT TUBE BILATERAL, COMBINED      age 5     wisdom teeth      2015       Social History:  Patient reports that she has never smoked. She has never used smokeless tobacco. She reports previous alcohol use. She reports that she does not use drugs.    Family History:  Family History   Problem  Relation Age of Onset     Hypertension Mother      Diabetes Type 2  Mother      Deep Vein Thrombosis No family hx of        Medications:  Current Outpatient Medications   Medication Sig Dispense Refill     blood glucose (NO BRAND SPECIFIED) lancets standard Use to test blood sugar 2 times daily or as directed. 100 each 11     blood glucose (NO BRAND SPECIFIED) test strip Use to test blood sugar 2 times daily or as directed. 100 each 11     blood glucose monitoring (NO BRAND SPECIFIED) meter device kit Use to test blood sugar 2 times daily or as directed. 1 kit 0     blood glucose monitoring (SOFTCLIX) lancets 1 each 2 times daily       Blood Glucose Monitoring Suppl (ACCU-CHEK GUIDE) w/Device KIT Inject 1 Units Subcutaneous 2 times daily       cholecalciferol (VITAMIN D3) 125 mcg (5000 units) capsule Take 125 mcg by mouth twice a week       estradiol (CLIMARA) 0.1 MG/24HR weekly patch Place 2 patches onto the skin once a week 24 patch 1     fenofibrate micronized (LOFIBRA) 200 MG capsule Take 1 capsule (200 mg) by mouth every morning (before breakfast) 90 capsule 3     lisinopril (ZESTRIL) 10 MG tablet Take 1 tablet (10 mg) by mouth daily 90 tablet 3     melatonin 3 MG tablet Take 5 mg by mouth nightly as needed for sleep        metFORMIN (GLUCOPHAGE-XR) 500 MG 24 hr tablet Take 2 tablets (1,000 mg) by mouth 2 times daily 360 tablet 3        Allergies   Allergen Reactions     Ceclor [Cefaclor]          Review of Systems:  -As per HPI  -Constitutional: Otherwise feeling well today, in usual state of health.  -HEENT: Patient denies nonhealing oral sores.  -Skin: As above in HPI. No additional skin concerns.    Physical exam:  Vitals: There were no vitals taken for this visit.  GEN: This is a well developed, well-nourished female in no acute distress, in a pleasant mood.    SKIN: Telemedicine photographs reviewed   -Barba skin type: II  -Dark brown to black terminal pigmented hairs on the bilateral neck, chin,  upper lip in beard distribution.   -No other lesions of concern on areas examined.                 Impression/Plan:    1. Gender dysphoria, plan for laser hair removal on the face and neck, which is medically necessary.    Barba skin type II. Recommend the 755 laser.     Location: Area planned for treatment and reviewed with patient is for face and neck.    Reviewed each treatment will target existing hair bulbs, but to treat all hair multiple treatments will be needed. Patient informed laser hair removal is not permenant and hair may regrow years later. Reviewed this will not work for blond or white hair. Any blonde or white hair will need electrolysis. Reviewed that home laser units may not be as efficacious.     Shaving hair prior to LHR will improve efficacy. Any plucking, waxing, electrolysis, or needs to be avoided at least 4 weeks before LHR. Adhere to strict sun avoidance for a minimum of 6 weeks before and after each treatment.     Electrolysis was offered as an alternative, especially for light hairs that may not response to laser hair removal. We would need to refer outside our sytem.     Hand-out provided on laser hair removal, including insurance approval process and contact information in the case of questions or concerns.     Patient was confirmed to not be using topical minoxidil in treatment areas.     We will initiate a letter to insurance to check for insurance coverage by messaging the [employer] derm pool.     Other notes for laser hair removal team:  None.      The patient will send a [employer] message in 2 weeks to check status of coverage.     Follow-up pending insurance approval.     Staff Involved:  Philip Tolentino MD  Pronouns: he/him/his    Department of Dermatology  [facility]: Phone: [phone], Fax:[phone]  [facility]: Phone: [phone] Fax: [phone]

## 2021-09-15 NOTE — TELEPHONE ENCOUNTER
PB DOS: TBD  Type of Procedure: LHR for face/neck  CPT Codes: 17999 x12  ICD10 Codes: F64.9  Surgeon/Ordering provider: Dr. Philip Tolentino, [id number]     Pre-cert/Authorization completed:  Pending review  Payer: [employer] [place]  Date Checked: 9/15/2021  Spoke to [employer] portal  Ref. # [id number], Tracking ID # [id number] / Auth # pending  Valid Dates: pending    Is the patient pending upcoming gender affirmation surgery: No   *No plan for gender reassignment surgery for now therefore no surgeon notes available/sent to ins. Also no letters of support for surgery sent due to not being available/completed.

## 2021-09-24 NOTE — TELEPHONE ENCOUNTER
Please see the request from [employer] below to continue review for LHR. They are requesting psych and mental health clinicals. Please reach out to the patient to obtain. Additional info needs to be submitted back to [facility] by 9/27/2021.

## 2021-09-27 NOTE — TELEPHONE ENCOUNTER
Writer spoke Yoli RN reviewer at [facility] and advised her that we are currently working on getting letters and we have been in touch with the patient. Yoli states she can extend the timeframe out to tomorrow 9/28 at 12:00pm noon. Yoli will reach out to me to check status on letters. We may have to withdraw the current request until the letters are received.

## 2021-09-27 NOTE — TELEPHONE ENCOUNTER
Hello.    With the denial on file, we wont be able to request an extension or a new request until the current request is approved or appealed.

## 2021-09-27 NOTE — TELEPHONE ENCOUNTER
Perla spoke with Martha on Friday regarding the need for a letter of support from a mental health provider.  Perla states that she has a message into her mental health provider to submit a letter to Martha.    Will ask that our financial counselor request an extension of the due date.    Ana Palomares RN

## 2021-09-28 NOTE — TELEPHONE ENCOUNTER
I reached out to [employer] and spoke with CORINNE Kent reviewer for this request. I asked that the case be withdrawn for now until we can obtain that letter of support from the patient. (if we did not withdraw, the request would have been denied and we would have to wait 45 days to submit a new request)    The letter needs to be within the last 6 months. Please scan the letter into the chart and send the PA encounter back to the FC's so we can request a new Auth.    Thank you,    Mallory Jha  Senior Intake Financial Counselor  [address] [address]  Ph: [phone]  Fax: [phone]

## 2021-10-21 ENCOUNTER — MEDICAL CORRESPONDENCE (OUTPATIENT)
Dept: HEALTH INFORMATION MANAGEMENT | Facility: CLINIC | Age: 31
End: 2021-10-21

## 2021-10-22 NOTE — TELEPHONE ENCOUNTER
Letter of support received and emailed to the financial counselors.  Original letter will be sent to [facility] to be scanned into the chart.  Ana Palomares RN

## 2021-10-23 ENCOUNTER — HEALTH MAINTENANCE LETTER (OUTPATIENT)
Age: 31
End: 2021-10-23

## 2021-10-25 NOTE — TELEPHONE ENCOUNTER
PB DOS: TBD   Type of Procedure: LHR for face/neck  CPT Codes: 17999 x12  ICD10 Codes: F64.9  Surgeon/Ordering provider: Dr. Philip Tolentino, [id number]     Pre-cert/Authorization completed:  Pending review  Payer: [employer] [place]  Date Checked:10/25/2021  Spoke to [employer] portal  Ref. #  [id number] Tracking #[id number]/ Auth # pending  Valid Dates: pending    Is the patient pending upcoming gender affirmation surgery: No

## 2021-10-29 NOTE — TELEPHONE ENCOUNTER
PB DOS: TBD   Type of Procedure: LHR for face/neck  CPT Codes: 17999 x12  ICD10 Codes: F64.9  Surgeon/Ordering provider: Dr. Philip Tolentino, [id number]     Pre-cert/Authorization completed:  Approved  Payer: [employer] [place]  Date Checked:10/25/2021  Spoke to [employer] portal  Ref. #  [id number] Tracking #[id number]/ Auth # [id number]  Valid Dates: 10/25/21-12/23/21    Is the patient pending upcoming gender affirmation surgery: No

## 2021-10-29 NOTE — TELEPHONE ENCOUNTER
I spoke with Perla and notified her of the approval.  LHR scheduled for 11/22/21.  I reviewed the following:  Treatment area(s): face and neck  Dates of coverage: through 12/23/21  Treatments covered: 12    Prep instructions:  1. Shave night before or morning of procedure.  2. For face/neck, one week before treatment stop retinols such as adapalene (Differin) and tretinoin (Retin-A) and bleaching creams such as hydroquinone.  3. Avoid plucking, waxing, or electrolysis 4 weeks before.  4. Strict sun avoidance 6 weeks prior to and 6 weeks after treatment.    Ana Palomares RN

## 2021-11-22 ENCOUNTER — OFFICE VISIT (OUTPATIENT)
Dept: DERMATOLOGY | Facility: CLINIC | Age: 31
End: 2021-11-22
Payer: COMMERCIAL

## 2021-11-22 DIAGNOSIS — F64.9 GENDER DYSPHORIA: Primary | ICD-10-CM

## 2021-11-22 PROCEDURE — 17999 UNLISTD PX SKN MUC MEMB SUBQ: CPT | Performed by: DERMATOLOGY

## 2021-11-22 NOTE — PROCEDURES
Laser Hair Removal Gentle Max Prox (755/1064nm)  Procedure Date: 2021    Staff Surgeon: Dr. Fischer  Resident Surgeon: DAHLIA  Assistant: Cindy Pham RN    Trimming required prior to treatment in clinic?: Yes  Barba skin type: II  Diagnosis/Treatment Reason: Gender dysphoria    Treatment#: 1     Test Area Settings:   Wavelength(755/1064nm): 755  Location: Left upper sideburn  Fluence: 20J, 22J   Spot size: 15mm  Pulse width:  3 mS ( mS)   Total Number of Pulses: 4  Dynamic cooling spray settin mS  Dynamic cooling device delay:  40 mS    Laser Settings:  Wavelength(755/1064nm): 755  Location: Face and neck  Fluence: 22J   Spot size: 15mm  Pulse width:  3 mS ( mS)   Total Number of Pulses: 208  Dynamic cooling spray settin mS  Dynamic cooling device delay:  40 mS      Anesthesia:  BLT applied to face and neck    Javier used?:No   Ice used?: Yes    Description of Operation/Procedure:   The nature and purpose of the procedure, associated risks, possible consequences, complications and alternative methods of treatment were explained in detail, this includes but is not limited to hyperpigmentation, hypopigmentation, scarring, bruising, hair loss pain/discomfort, eye injury, paradoxical hair growth, hives, infection, itching  and blister.   We reviewed that the outcome could be any of the following: no improvement, slight improvement or change in skin color & texture, the skin might be permanently lighter or darker, and though uncommon, superficial scarring may occur.  Multiple treatments are required. We reviewed with patients that laser hair removal is a reduction in hair and not permanent. The risks were again reviewed including skin hyperpigmentation, hypopigmentation, scarring, bruising blistering. Reviewed white and red hair do not respond. Reviewed hair may regrow as it is not permanent but a reduction. Reviewed risks of paradoxical hypertrichosis. Patient has been offered electrolysis as  alternative.  Reviewed that a 3 months waiting period prior to surgery to recommended. Reviewed that the patient can terminate the procedure at anytime. Electrolysis was offered as an alternative.     An operative consent were obtained. Time-out was performed.  The patient was positioned to optimally expose the area treated. Dynamic cooling was verified and functioning properly.  Protective eyewear was worn by the patient and goggles on all personnel in the treatment room.  The patient confirmed the site to be treated. The laser energy output was verified by meter reading.  N95 and buffalo filtration was used.     The clinically evident lesion(s) was/were treated with laser beam as above with 1 pass.  The patient tolerated the procedure well and no complications were noted. Post operative instructions were provided. The total laser operation and preparation time was 10 minutes.  The patient was counseled to contact us immediately for any concerns. The patient was offered and recommended to read their after visit summary.     Numeric pain scale after treatment: 8/10    The patient will follow-up in 4 weeks.    Billed to insurance.     Dr. Fischer staffed the patient was present for ascertainment of protective equipment utilization, calibration of laser, and initial pulses of treatment of area(s).    Cindy Pham RN performed entire procedure.    Staff Involved:  Staff Only      Staff Physician Comments:   I saw and evaluated the patient with the RN (Cindy Pham RN).  I agree with the assessment and plan and description of the procedure as above.   I personally approved the laser settings and treatment area.   I was present for the test spots. I was immediately available at all times.     Daniel Fischer DO    Department of Dermatology  [facility]: Phone: [phone], Fax:[phone]  [facility]: Phone: [phone], Fax: [phone]

## 2021-11-22 NOTE — LETTER
11/22/2021         RE: Osman Castellano  [address]  [place] [address]        Dear Colleague,    Thank you for referring your patient, Osman Castellano, to the [facility]. Please see a copy of my visit note below.    The patient presents for laser hair removal.   See procedure note for details.     Daniel Fischer DO    Department of Dermatology  [facility]: Phone: [phone], Fax:[phone]  [facility]: Phone: [phone], Fax: [phone]        Again, thank you for allowing me to participate in the care of your patient.        Sincerely,        Daniel Fischer MD

## 2021-11-22 NOTE — NURSING NOTE
Dermatology Laser Intake Checklist:  History of psoriasis: No  History of recent tan, indoor or outdoor tanning/vacation or other sun exposure: No  History of vitiligo: No  Family history of vitiligo: No  Recent other cosmetic procedure(microderm abrasion/peel/hair removal/facial etc): No  History of HSV: Yes, cold sore over 5 years ago  Did the patient start valtrex: No  For genital laser hair removal patient only: Is there a history of genital warts or condyloma: No  Tattoo in the area to be treated: No  Is patient using hydroquinone: No  Retinoids and other acne medications stopped for 2 weeks: No  Has the patient had accutane in the last 6-12 months: No  Pregnant or breastfeeding: No  History of skin cancer in area planned for treatment: No  History of treatment with gold: No  Changes in medical history: No  Photos obtained: No  Does the patient smoke: No  Is the patient on ibuprofen/aspirin/plavix/coumadin/other blood thinner: No  If patient is taking narcotic or diazepam(valium)-does patient have : No  There were no vitals taken for this visit.    BLT applied to face and neck  LOT: 08--19@7  : 02/15/2022    Cindy Pham RN

## 2021-11-22 NOTE — PATIENT INSTRUCTIONS
Gentle Max Laser Hair Reduction Instructions    Laser hair reduction: I will have redness, pain and swelling. I may have skin discoloration, bruising and itching. Risks are permenant discoloration, hives, infection scarring, eye injury,hair growth, and blister. The outcome could be no improvement or slight improvement. Multiple treatments are required. All hair will not be removed.     Before the procedure:  Sun Protection:  Do not allow the area to become tan or come in with a tan for a treatment. Tans will increased the risks of the procedure. Do not use spray or any over counter product self tanners prior to the procedure.     Shaving:   Gently shave the area the evening before the procedure.     Other:  Avoid any retinols such as Differin, adapalene, retinol or tretinoin to the treated area 1 week prior. Hold bleaching creams such as hydroquinone until 1 week prior.  Avoid any hair removal procedures such as waxing, electrolysis or other lasers on the skin within 6 weeks prior. Do not have any other cosmetic procedures done on the area within the prior 6 weeks such as chemical peels or dermabrasion.      Post Procedure:  Day 1-7  The healing time for any given treatment varies between clients. The following represents the general recovery phases you might expect. Individual clients may experience variations from this course.     Swelling/Discomfort/Redness:  The most common side effects are erythema and edema (redness and swelling) which generally occur immediately after and typically resolve within 48 hours. If crusting and blistering occurs call the clinic.     Activity:  Avoid swimming the day of laser hair removal treatment. Also, no rigorous exercise the day of treatment.     Moisturizers and Sunscreens:  Wait until the skin has returned to normal to start topical products.     Sun Protection:  Do not allow the area to become tan or come in with a tan for a treatment. Tans will increased the risks of the  procedure. Do not use spray or any over counter product self tanners prior to the procedure.     Warning signs:  Call the clinic if you have significant pain, increasing redness, pus, blisters/crusting or for any other concerns.     Who should I call with questions?    [facility]: [phone]    [facility]: [phone]    For urgent needs outside of business hours call the [facility] at [phone] and ask for the dermatology resident on call    [employer] messaging response may be delayed by several days

## 2021-11-29 DIAGNOSIS — Z79.4 TYPE 2 DIABETES MELLITUS WITH MICROALBUMINURIA, WITH LONG-TERM CURRENT USE OF INSULIN (H): Primary | ICD-10-CM

## 2021-11-29 DIAGNOSIS — F64.9 GENDER DYSPHORIA: ICD-10-CM

## 2021-11-29 DIAGNOSIS — Z51.81 ENCOUNTER FOR THERAPEUTIC DRUG MONITORING: ICD-10-CM

## 2021-11-29 DIAGNOSIS — E11.29 TYPE 2 DIABETES MELLITUS WITH MICROALBUMINURIA, WITH LONG-TERM CURRENT USE OF INSULIN (H): Primary | ICD-10-CM

## 2021-11-29 DIAGNOSIS — R80.9 TYPE 2 DIABETES MELLITUS WITH MICROALBUMINURIA, WITH LONG-TERM CURRENT USE OF INSULIN (H): Primary | ICD-10-CM

## 2021-12-18 ENCOUNTER — HEALTH MAINTENANCE LETTER (OUTPATIENT)
Age: 31
End: 2021-12-18

## 2021-12-18 NOTE — PROGRESS NOTES
The patient presents for laser hair removal.   See procedure note for details.     Daniel Fischer DO    Department of Dermatology  [facility]: Phone: [phone], Fax:[phone]  [facility]: Phone: [phone], Fax: [phone]

## 2021-12-23 ENCOUNTER — OFFICE VISIT (OUTPATIENT)
Dept: DERMATOLOGY | Facility: CLINIC | Age: 31
End: 2021-12-23
Payer: COMMERCIAL

## 2021-12-23 DIAGNOSIS — F64.9 GENDER DYSPHORIA: ICD-10-CM

## 2021-12-23 PROCEDURE — 17999 UNLISTD PX SKN MUC MEMB SUBQ: CPT | Mod: 58 | Performed by: DERMATOLOGY

## 2021-12-23 NOTE — PROGRESS NOTES
LMX (approx 7.5 grams) applied to the face and neck.    NDC: 48700459-33  Exp: 03/2023    Arline Irvin LPN      The patient presents for laser hair removal.   See procedure note for details.     Daniel Fischer DO    Department of Dermatology  [facility]: Phone: [phone], Fax:[phone]  [facility]: Phone: [phone], Fax: [phone]

## 2021-12-23 NOTE — LETTER
12/23/2021         RE: Osman Castellano  [address]  [place] [address]        Dear Colleague,    Thank you for referring your patient, Osman Castellano, to the [facility]. Please see a copy of my visit note below.    LMX (approx 7.5 grams) applied to the face and neck.    NDC: 71355894-38  Exp: 03/2023    Arline Irvin LPN      The patient presents for laser hair removal.   See procedure note for details.     Daniel Fischer DO    Department of Dermatology  [facility]: Phone: [phone], Fax:[phone]  [facility]: Phone: [phone], Fax: [phone]        Again, thank you for allowing me to participate in the care of your patient.        Sincerely,        Daniel Fischer MD

## 2021-12-23 NOTE — NURSING NOTE
Osman Castellano's goals for this visit include:   Chief Complaint   Patient presents with     Laser Treatment     LHR face and neck       She requests these members of her care team be copied on today's visit information: no    PCP: Sima Chopra    Referring Provider:  No referring provider defined for this encounter.    There were no vitals taken for this visit.    Do you need any medication refills at today's visit? No    Arline Irvin LPN      Dermatology Laser Intake Checklist:  History of psoriasis: No  History of recent tan, indoor or outdoor tanning/vacation or other sun exposure: No  History of vitiligo: No  Family history of vitiligo: No  Recent other cosmetic procedure(microderm abrasion/peel/hair removal/facial etc): No  History of HSV: No  Did the patient start valtrex: No  For genital laser hair removal patient only: Is there a history of genital warts or condyloma: No  Tattoo in the area to be treated: No  Is patient using hydroquinone: No  Retinoids and other acne medications stopped for 2 weeks: No  Has the patient had accutane in the last 6-12 months: No  Pregnant or breastfeeding: No  History of skin cancer in area planned for treatment: No  History of treatment with gold: No  Changes in medical history: No  Photos obtained:   Does the patient smoke: No  Is the patient on ibuprofen/aspirin/plavix/coumadin/other blood thinner: No  If patient is taking narcotic or diazepam(valium)-does patient have : No  There were no vitals taken for this visit.     Arline Irvin LPN

## 2021-12-23 NOTE — PROCEDURES
Laser Hair Removal Gentle Max Prox (755/1064nm)  Procedure Date: Dec 23, 2021    Staff Surgeon:   Assistant: Yu Grajeda RN     Trimming required prior to treatment in clinic?: yes pt shaved prior to the treatment   Barba skin type: II  Diagnosis/Treatment Reason: gender dysphoria    Treatment#: 2     Test Area Settings:   Wavelength(755/1064nm): 755  Location: left upper cheek   Fluence: 24J   Spot size: 15mm  Pulse width:  3 mS ( mS)   Total Number of Pulses: 2  Dynamic cooling spray settin mS  Dynamic cooling device delay:  40 mS    Laser Settings:  Wavelength(755/1064nm): 755  Location: face and neck  Fluence: 24J   Spot size: 15mm  Pulse width:  3 mS ( mS)   Total Number of Pulses: 122  Dynamic cooling spray settin mS  Dynamic cooling device delay:  40 mS          Anesthesia:  Topical LMX NDC # 24931-231-16 Exp 2023    Javier used?:No  Ice used?: Yes    Description of Operation/Procedure:       for return laser hair removal patients.*The risks were again reviewed including skin hyperpigmentation, hypopigmentation, scarring, bruising, and blistering. Reviewed white and red hair do not respond. Reviewed hair may regrow as it is not permanent but a reduction in hair growth.  Reviewed that a 3 months waiting period prior to surgery to recommended. Reviewed that the patient can terminate the procedure at anytime.       Time-out was performed.  The patient was positioned to optimally expose the area treated. Dynamic cooling was verified and functioning properly.  Protective eyewear was worn by the patient and goggles on all personnel in the treatment room.  The patient confirmed the site to be treated. The laser energy output was verified by meter reading.  N95 and buffalo filtration was used.     The clinically evident lesion(s) was/were treated with laser beam as above with 1 pass.  The patient tolerated the procedure well and no complications were noted. Post operative  instructions were provided. The total laser operation and preparation time was 8 minutes.  The patient was counseled to contact us immediately for any concerns. The patient was offered and recommended to read their after visit summary.       The patient will follow-up in 6 weeks.    Billed to insurance    Dr. Fischer staffed the patient was present for identifying and marking target area(s),, ascertainment of protective equipment utilization,, calibration of laser, and treatment of a portion of the lesion,      Staff Involved:  RN performed the entire procedure. Yu Grajeda RN    Staff Physician Comments:   I saw and evaluated the patient with the RN (Yu Grajeda RN).  I agree with the assessment and plan and description of the procedure as above.   I personally approved the laser settings and treatment area.   I was present for the test spots. I was immediately available at all times.     Daniel Fischer DO    Department of Dermatology  [facility]: Phone: [phone], Fax:[phone]  [facility]: Phone: [phone], Fax: [phone]

## 2021-12-29 ENCOUNTER — IMMUNIZATION (OUTPATIENT)
Dept: NURSING | Facility: CLINIC | Age: 31
End: 2021-12-29
Payer: COMMERCIAL

## 2021-12-29 PROCEDURE — 0004A PR COVID VAC PFIZER DIL RECON 30 MCG/0.3 ML IM: CPT

## 2021-12-29 PROCEDURE — 91300 PR COVID VAC PFIZER DIL RECON 30 MCG/0.3 ML IM: CPT

## 2022-01-11 ENCOUNTER — LAB (OUTPATIENT)
Dept: LAB | Facility: CLINIC | Age: 32
End: 2022-01-11
Payer: COMMERCIAL

## 2022-01-11 DIAGNOSIS — E11.29 TYPE 2 DIABETES MELLITUS WITH MICROALBUMINURIA, WITH LONG-TERM CURRENT USE OF INSULIN (H): ICD-10-CM

## 2022-01-11 DIAGNOSIS — F64.9 GENDER DYSPHORIA: ICD-10-CM

## 2022-01-11 DIAGNOSIS — Z79.4 TYPE 2 DIABETES MELLITUS WITH MICROALBUMINURIA, WITH LONG-TERM CURRENT USE OF INSULIN (H): ICD-10-CM

## 2022-01-11 DIAGNOSIS — R80.9 TYPE 2 DIABETES MELLITUS WITH MICROALBUMINURIA, WITH LONG-TERM CURRENT USE OF INSULIN (H): ICD-10-CM

## 2022-01-11 DIAGNOSIS — Z51.81 ENCOUNTER FOR THERAPEUTIC DRUG MONITORING: ICD-10-CM

## 2022-01-11 LAB
ALBUMIN SERPL-MCNC: 3.6 G/DL (ref 3.4–5)
ALP SERPL-CCNC: 28 U/L (ref 40–150)
ALT SERPL W P-5'-P-CCNC: 22 U/L (ref 0–70)
ANION GAP SERPL CALCULATED.3IONS-SCNC: 7 MMOL/L (ref 3–14)
AST SERPL W P-5'-P-CCNC: 21 U/L (ref 0–45)
BILIRUB SERPL-MCNC: 0.3 MG/DL (ref 0.2–1.3)
BUN SERPL-MCNC: 10 MG/DL (ref 7–30)
CALCIUM SERPL-MCNC: 9.3 MG/DL (ref 8.5–10.1)
CHLORIDE BLD-SCNC: 101 MMOL/L (ref 94–109)
CO2 SERPL-SCNC: 27 MMOL/L (ref 20–32)
CREAT SERPL-MCNC: 0.82 MG/DL (ref 0.52–1.25)
CREAT UR-MCNC: 81 MG/DL
ESTRADIOL SERPL-MCNC: 101 PG/ML
GFR SERPL CREATININE-BSD FRML MDRD: >90 ML/MIN/1.73M2
GLUCOSE BLD-MCNC: 104 MG/DL (ref 70–99)
HBA1C MFR BLD: 5.6 % (ref 0–5.6)
MICROALBUMIN UR-MCNC: 37 MG/L
MICROALBUMIN/CREAT UR: 45.68 MG/G CR (ref 0–25)
POTASSIUM BLD-SCNC: 4.1 MMOL/L (ref 3.4–5.3)
PROT SERPL-MCNC: 8.1 G/DL (ref 6.8–8.8)
SODIUM SERPL-SCNC: 135 MMOL/L (ref 133–144)

## 2022-01-11 PROCEDURE — 36415 COLL VENOUS BLD VENIPUNCTURE: CPT

## 2022-01-11 PROCEDURE — 83036 HEMOGLOBIN GLYCOSYLATED A1C: CPT

## 2022-01-11 PROCEDURE — 82043 UR ALBUMIN QUANTITATIVE: CPT

## 2022-01-11 PROCEDURE — 82670 ASSAY OF TOTAL ESTRADIOL: CPT

## 2022-01-11 PROCEDURE — 80053 COMPREHEN METABOLIC PANEL: CPT

## 2022-01-25 ENCOUNTER — TELEPHONE (OUTPATIENT)
Dept: DERMATOLOGY | Facility: CLINIC | Age: 32
End: 2022-01-25
Payer: COMMERCIAL

## 2022-01-25 NOTE — TELEPHONE ENCOUNTER
Patient undergoing LHR.  Auth  2021.  Please request extension thru insurance.  Next appt is 2022.    Helena Ferguson RN

## 2022-01-26 NOTE — TELEPHONE ENCOUNTER
PB DOS: TBD   Type of Procedure: LHR for face/neck  CPT Codes: 17999 x12  ICD10 Codes: F64.9  Surgeon/Ordering provider: Dr. Philip Tolentino, [id number]   Pre-cert/Authorization completed:  PA not required  Payer: [employer] [place]  Spoke to[employer] Policy   Ref.# Auth #    Advise patient to contact insurance for benefit and coverage details.

## 2022-01-27 ENCOUNTER — OFFICE VISIT (OUTPATIENT)
Dept: FAMILY MEDICINE | Facility: CLINIC | Age: 32
End: 2022-01-27
Payer: COMMERCIAL

## 2022-01-27 VITALS
DIASTOLIC BLOOD PRESSURE: 85 MMHG | HEART RATE: 87 BPM | TEMPERATURE: 98.9 F | RESPIRATION RATE: 16 BRPM | WEIGHT: 293 LBS | SYSTOLIC BLOOD PRESSURE: 131 MMHG | BODY MASS INDEX: 51.68 KG/M2 | OXYGEN SATURATION: 99 %

## 2022-01-27 DIAGNOSIS — R80.9 TYPE 2 DIABETES MELLITUS WITH MICROALBUMINURIA, WITHOUT LONG-TERM CURRENT USE OF INSULIN (H): ICD-10-CM

## 2022-01-27 DIAGNOSIS — E66.813 CLASS 3 SEVERE OBESITY DUE TO EXCESS CALORIES WITH SERIOUS COMORBIDITY AND BODY MASS INDEX (BMI) OF 45.0 TO 49.9 IN ADULT (H): ICD-10-CM

## 2022-01-27 DIAGNOSIS — F64.9 GENDER DYSPHORIA: ICD-10-CM

## 2022-01-27 DIAGNOSIS — L98.9 SKIN LESION: ICD-10-CM

## 2022-01-27 DIAGNOSIS — I10 ESSENTIAL HYPERTENSION: ICD-10-CM

## 2022-01-27 DIAGNOSIS — E66.01 CLASS 3 SEVERE OBESITY DUE TO EXCESS CALORIES WITH SERIOUS COMORBIDITY AND BODY MASS INDEX (BMI) OF 45.0 TO 49.9 IN ADULT (H): ICD-10-CM

## 2022-01-27 DIAGNOSIS — Z79.4 TYPE 2 DIABETES MELLITUS WITHOUT COMPLICATION, WITH LONG-TERM CURRENT USE OF INSULIN (H): ICD-10-CM

## 2022-01-27 DIAGNOSIS — E11.9 TYPE 2 DIABETES MELLITUS WITHOUT COMPLICATION, WITH LONG-TERM CURRENT USE OF INSULIN (H): ICD-10-CM

## 2022-01-27 DIAGNOSIS — N18.1 CHRONIC KIDNEY DISEASE, STAGE 1: Primary | ICD-10-CM

## 2022-01-27 DIAGNOSIS — E11.29 TYPE 2 DIABETES MELLITUS WITH MICROALBUMINURIA, WITHOUT LONG-TERM CURRENT USE OF INSULIN (H): ICD-10-CM

## 2022-01-27 DIAGNOSIS — Z92.29 HAS RECEIVED FIRST DOSE OF HEPATITIS B VACCINE: ICD-10-CM

## 2022-01-27 DIAGNOSIS — E78.1 HYPERTRIGLYCERIDEMIA: ICD-10-CM

## 2022-01-27 DIAGNOSIS — Z23 NEED FOR INFLUENZA VACCINATION: ICD-10-CM

## 2022-01-27 PROCEDURE — 99214 OFFICE O/P EST MOD 30 MIN: CPT | Mod: 25 | Performed by: STUDENT IN AN ORGANIZED HEALTH CARE EDUCATION/TRAINING PROGRAM

## 2022-01-27 PROCEDURE — 90471 IMMUNIZATION ADMIN: CPT | Performed by: STUDENT IN AN ORGANIZED HEALTH CARE EDUCATION/TRAINING PROGRAM

## 2022-01-27 PROCEDURE — 90686 IIV4 VACC NO PRSV 0.5 ML IM: CPT | Performed by: STUDENT IN AN ORGANIZED HEALTH CARE EDUCATION/TRAINING PROGRAM

## 2022-01-27 RX ORDER — METFORMIN HCL 500 MG
1000 TABLET, EXTENDED RELEASE 24 HR ORAL 2 TIMES DAILY
Qty: 360 TABLET | Refills: 3 | Status: SHIPPED | OUTPATIENT
Start: 2022-01-27 | End: 2022-01-28

## 2022-01-27 RX ORDER — LISINOPRIL 10 MG/1
10 TABLET ORAL DAILY
Qty: 90 TABLET | Refills: 3 | Status: SHIPPED | OUTPATIENT
Start: 2022-01-27 | End: 2022-01-28

## 2022-01-27 RX ORDER — ESTRADIOL 0.1 MG/D
2 PATCH TRANSDERMAL WEEKLY
Qty: 24 PATCH | Refills: 1 | Status: SHIPPED | OUTPATIENT
Start: 2022-01-27 | End: 2022-01-28

## 2022-01-27 RX ORDER — FENOFIBRATE 200 MG/1
200 CAPSULE ORAL
Qty: 90 CAPSULE | Refills: 3 | Status: SHIPPED | OUTPATIENT
Start: 2022-01-27 | End: 2022-01-28

## 2022-01-27 NOTE — PROGRESS NOTES
Assessment & Plan     Chronic kidney disease, stage 1  Type 2 diabetes mellitus with microalbuminuria, without long-term current use of insulin (H)  Class 3 severe obesity due to excess calories with serious comorbidity and body mass index (BMI) of 45.0 to 49.9 in adult (H)  Patient with significantly improved A1c from presentation, is taking her medication as prescribed and has been working on diet and exercise.  We talked about goals of returning to better habits when we are able, she is getting back into exercising although it is difficult in the winter when it is slippery.  Over the holidays her diet is not which she prefers it to be, she is returning to working on her diet.  We are due to check lipids, these will be drawn when she is fasting and she will come in for future visit.  We will continue to see patient every 3 months to check in for diabetes care.  She is due for diabetic eye exam this referral was placed and should be done in the next year.  - lisinopril (ZESTRIL) 10 MG tablet; Take 1 tablet (10 mg) by mouth daily  - metFORMIN (GLUCOPHAGE-XR) 500 MG 24 hr tablet; Take 2 tablets (1,000 mg) by mouth 2 times daily  - blood glucose (NO BRAND SPECIFIED) lancets standard; Use to test blood sugar 2 times daily or as directed.  - blood glucose (NO BRAND SPECIFIED) test strip; Use to test blood sugar 2 times daily or as directed.    Gender dysphoria  Patient is receiving estradiol patches, coming up in February will have been 1 year on estrogen, 8 months on current dose.  Has been tolerating well the patches are going well is noticing softening of skin changes and some features.  Body fat distribution altered by current exercise and weight loss efforts.  We discussed expected timeline of starting progesterone or not, discussed timeline of puberty and breast bud development before starting progesterone, patient is in agreement with this plan to start progesterone only after 18 months of estrogen and further  optimization of the rest of her health.  She has previously not been a candidate for finasteride or spironolactone, has had significant improvement in her health since that time and she may now be a good candidate.  She has also stated that she would be interested in doing injectable estrogen if at all possible, depending on what her testosterone level is.  - estradiol (CLIMARA) 0.1 MG/24HR weekly patch; Place 2 patches onto the skin once a week  - Testosterone total; Future  - Lipid panel reflex to direct LDL Fasting; Future    Has received first dose of hepatitis B vaccine  Patient remembers receiving a dose of hepatitis B vaccine, is not sure if they have received others.  According to AUDIE is due to receive further vaccination, we will check hepatitis B surface antibody to check for a level of response to previous and see if further vaccinations are needed.  Anticipate that we will start the hepatitis B vaccination series at next visit.  - Hepatitis B Surface Antibody    Essential hypertension  Is taking as prescribed, notes that she does have anxiety coming into the clinic setting and her blood pressure will be mildly elevated when she comes in.  No hypotensive episodes on this regimen.  - lisinopril (ZESTRIL) 10 MG tablet; Take 1 tablet (10 mg) by mouth daily    Hypertriglyceridemia  Needs refill in switching to new pharmacy, this was provided today.  Is working on diet and activity level per previous discussions.  - fenofibrate micronized (LOFIBRA) 200 MG capsule; Take 1 capsule (200 mg) by mouth every morning (before breakfast)    Need for influenza vaccination  Due for flu vaccine this was ordered and received today.  - NM FLU VAC PRESRV FREE QUAD SPLIT VIR IM  MONTHS IM    Skin lesion  Skin lesion to right heel small papule, nontender, no active bleeding or signs of scratching.  At this time I would continue to monitor, noted that Lenora should reach out to us if it starts bleeding, itching, changing.   Reviewed characteristics of skin findings that I would want to lay eyes on sooner rather than later (growing in size, multi colors, unclear borders, itching or bleeding, pain).  At this time we will continue to monitor.  Pictures below      No follow-ups on file.    Sima Gilbert MD  [facility] LOLIS Duncan is a 31 year old who presents for the following health issues     HPI     Diabetes  -Small increase in A1c, notes that with it being icing slippery outside it has been harder to be active and get out to do things  -Reengaging in physical activity, has been stably taking medications as prescribed, is working on diet and balance    Gender dysphoria  -Noting softer skin and some changes with the estrogen, is interested to know if they are testosterone is suppressed.  Would want to know about options around spironolactone or finasteride, was curious to know what the expected timeline is for progesterone    Skin lesion  -Has had a small bump on the outside of her right heel that bleeds if she digs at it but otherwise is not painful, sometimes itchy.  Has not grown or changed in size, is nontender to palpation, no other similar lesions.    Review of Systems   See HPI      Objective    /85 (BP Location: Left arm, Patient Position: Sitting, Cuff Size: Adult Large)   Pulse 87   Temp 98.9  F (37.2  C) (Oral)   Resp 16   Wt (!) 155.2 kg (342 lb 3.2 oz)   SpO2 99%   BMI 51.68 kg/m    Body mass index is 51.68 kg/m .  Physical Exam   Vital signs reviewed  Constitutional: Age appropriate human, no acute distress  HENT: Sclera non-icteric and not injected, pink conjunctivae  Resp: Speaking in full sentences on room air, no respiratory distress   Skin: Warm, dry, small nontender nonbloody nonerythematous lesion noted to right heel pictured below        Msk: Ambulates independently, full range of gesture, no lower extremity edema   Neuro: Alert and oriented x4, movements  coordinated and symmetric, appropriate gait  Psych: No acute distress, stable mildly anxious affect      Lab on 01/11/2022   Component Date Value Ref Range Status     Hemoglobin A1C 01/11/2022 5.6  0.0 - 5.6 % Final    Normal <5.7%   Prediabetes 5.7-6.4%    Diabetes 6.5% or higher     Note: Adopted from ADA consensus guidelines.     Sodium 01/11/2022 135  133 - 144 mmol/L Final     Potassium 01/11/2022 4.1  3.4 - 5.3 mmol/L Final     Chloride 01/11/2022 101  94 - 109 mmol/L Final    0-20 years:       Female:  mmol/L  Male:    mmol/L       20 years and older:   Female:  mmol/L   Male:    mmol/L       Carbon Dioxide (CO2) 01/11/2022 27  20 - 32 mmol/L Final     Anion Gap 01/11/2022 7  3 - 14 mmol/L Final     Urea Nitrogen 01/11/2022 10  7 - 30 mg/dL Final    Female  0 to 15 days           3-23  15 days to 1 year      3-17  1 to 10 years          9-22  10 to 19 years         7-19  19 years and older     7-30    Male  0 to 15 days           3-23  15 days to 1 year      3-17  1 to 10 years          9-22  10 to 19 years         7-21  19 years and older     7-30     Creatinine 01/11/2022 0.82  0.52 - 1.25 mg/dL Final    20 y and older Female 0.52-1.04 mg/dL  20 y and older Male 0.66-1.25 mg/dL    Varies with the amount of muscle mass present.    GICH  Female: 0.6-1.2 mg/dL  Male: 0.7-1.3 mg/dL       Calcium 01/11/2022 9.3  8.5 - 10.1 mg/dL Final     Glucose 01/11/2022 104* 70 - 99 mg/dL Final     Alkaline Phosphatase 01/11/2022 28* 40 - 150 U/L Final    Female:    0-3 years  110-320 U/L  4-9 years  150-420 U/L  10-11 years  130-560 U/L  12-13 years  105-420 U/L  14-15 years   U/L  16 years and older   U/L      Male:  0-3 years  110-320 U/L  4-9 years  150-420 U/L  10-15 years  130-530 U/L  16-19 years   U/L  20 years and older   U/L       AST 01/11/2022 21  0 - 45 U/L Final     ALT 01/11/2022 22  0 - 70 U/L Final    Female    All ages 0-50 U/L    Male    0 - 19 years        0-50 U/L  20 years and older 0-70 U/L            Protein Total 01/11/2022 8.1  6.8 - 8.8 g/dL Final     Albumin 01/11/2022 3.6  3.4 - 5.0 g/dL Final     Bilirubin Total 01/11/2022 0.3  0.2 - 1.3 mg/dL Final     GFR Estimate 01/11/2022 >90  >60 mL/min/1.73m2 Final    GFR not calculated when sex unspecified or nonbinary.  Effective December 21, 2021 eGFRcr in adults is calculated using the 2021 CKD-EPI creatinine equation which includes age and gender (Fariba et al., NEJ, DOI: 10.1056/JBDOfh6639594)     Estradiol 01/11/2022 101  pg/mL Final    Estradiol reference ranges for males:  Not determined - 39.8    Ultrasensitive estradial (ES2) recommended.     Creatinine Urine mg/dL 01/11/2022 81  mg/dL Final     Albumin Urine mg/L 01/11/2022 37  mg/L Final     Albumin Urine mg/g Cr 01/11/2022 45.68* 0.00 - 25.00 mg/g Cr Final    0-17 y             0-25 mg/g creatinine  18 y and older, Female 0-25 mg/g creatinine  18 y and older, Male 0-17 mg/g creatinine

## 2022-01-27 NOTE — PATIENT INSTRUCTIONS
Hello,    It was solo to see you today, thanks for coming in the even though it is a winter [place] outside.  Let us see what your testosterone level is when you can, we will also grab a lipid level check at that time.  And then to talk to our pharmacist and review your chart to see if maybe some doors have opened up for us given the significant strides you have made in the rest of your health.  I also want to take this moment to point out that you have done an incredible amount of work in lowering your A1c (improving your diabetes), lowering your blood pressure, taking care of your kidneys and blood vessels.  You have made more progress in 1 year then many people make over 5.  I know that you were not as diligent with your diet and exercise over the winter as you would prefer to be, but while I do not want to take away your motivation to get back involved in the as healthy as you want to be I also want to acknowledge that you have done a huge amount of work.    As we discussed regarding skin findings, what makes me worried about a mole or a lesion is if it is changing size, if its borders are irregular (think squiggly lines versus clean circles), if it itches or bleeds, if it is painful, if it is multiple colors.  I am also happy to look at something if it ever concerns you, but those of the criteria I am going through in my brain if that is helpful to you.  Lets keep an eye on your heel, if it is painful or grows or changes let me know.    I will look into injectable estrogen and get back to you.  Like we talked about her usual timeline we think about for starting progesterone is 18 months to 2 years after starting estrogen.  This most closely mimics puberty.  In the meantime we can continue to optimize the rest of your health is much as we can.    IMCHELLE Gilbert MD

## 2022-01-27 NOTE — PROGRESS NOTES
Preceptor Attestation:   Patient seen, evaluated and discussed with the resident. I have verified the content of the note, which accurately reflects my assessment of the patient and the plan of care.   Supervising Physician:  Abdulaziz Jackson MD

## 2022-01-31 ENCOUNTER — LAB (OUTPATIENT)
Dept: LAB | Facility: CLINIC | Age: 32
End: 2022-01-31

## 2022-01-31 DIAGNOSIS — F64.9 GENDER DYSPHORIA: ICD-10-CM

## 2022-01-31 LAB
CHOLEST SERPL-MCNC: 139 MG/DL
FASTING STATUS PATIENT QL REPORTED: ABNORMAL
HBV SURFACE AB SERPL IA-ACNC: 0 M[IU]/ML
HDLC SERPL-MCNC: 30 MG/DL
LDLC SERPL CALC-MCNC: 74 MG/DL
NONHDLC SERPL-MCNC: 109 MG/DL
TRIGL SERPL-MCNC: 176 MG/DL

## 2022-01-31 PROCEDURE — 86706 HEP B SURFACE ANTIBODY: CPT | Performed by: STUDENT IN AN ORGANIZED HEALTH CARE EDUCATION/TRAINING PROGRAM

## 2022-01-31 PROCEDURE — 80061 LIPID PANEL: CPT | Performed by: STUDENT IN AN ORGANIZED HEALTH CARE EDUCATION/TRAINING PROGRAM

## 2022-01-31 PROCEDURE — 84403 ASSAY OF TOTAL TESTOSTERONE: CPT | Performed by: STUDENT IN AN ORGANIZED HEALTH CARE EDUCATION/TRAINING PROGRAM

## 2022-01-31 PROCEDURE — 36415 COLL VENOUS BLD VENIPUNCTURE: CPT | Performed by: STUDENT IN AN ORGANIZED HEALTH CARE EDUCATION/TRAINING PROGRAM

## 2022-02-02 LAB — TESTOST SERPL-MCNC: 31 NG/DL (ref 8–950)

## 2022-02-07 ENCOUNTER — MYC MEDICAL ADVICE (OUTPATIENT)
Dept: FAMILY MEDICINE | Facility: CLINIC | Age: 32
End: 2022-02-07
Payer: COMMERCIAL

## 2022-02-07 DIAGNOSIS — R80.9 TYPE 2 DIABETES MELLITUS WITH MICROALBUMINURIA, WITHOUT LONG-TERM CURRENT USE OF INSULIN (H): ICD-10-CM

## 2022-02-07 DIAGNOSIS — E11.29 TYPE 2 DIABETES MELLITUS WITH MICROALBUMINURIA, WITHOUT LONG-TERM CURRENT USE OF INSULIN (H): ICD-10-CM

## 2022-02-07 DIAGNOSIS — F64.9 GENDER DYSPHORIA: Primary | ICD-10-CM

## 2022-02-08 NOTE — TELEPHONE ENCOUNTER
"Pt returned the call on 2/9/2022 and states she called her insurance and they told pt there was no renewal on file. RN reached out to  Financial counselor to review this case. See FC's message from today..Yu Grajeda RN                  RN was reviewing patients chart in preparation of LHR treatment tommorrow. RN noted message below from FC's but no further documentation from clinic staff to indicate pt was notified to contact insurance about coverage and benefits. RN reached out to pt and notified her of Benefit investigation below and to reach out to her insurance to inquire about Coverage. Pt also notified that a Non-coverage waiver will need to be signed for the treatment tomorrow in the event it is not covered by insurance. Pt states \" I thought this was done back in December\" Pt notified that a PA was sent for re-submission back on 1/25/2022 and results came back on 1/26/2022. Pt questioned why shes being called now. RN apologized to patient that she was not contacted sooner than today but RN wanted to update patient with this information prior to her arrival to the clinic so she could still reach out to her insurance if needed.Pt voiced understanding and stated \"ok\". RN advised pt to call the clinic number back if she had any further questions or if she had any questions for the Financial counseling team...Yu Grajeda RN    "

## 2022-02-09 ENCOUNTER — OFFICE VISIT (OUTPATIENT)
Dept: DERMATOLOGY | Facility: CLINIC | Age: 32
End: 2022-02-09
Payer: COMMERCIAL

## 2022-02-09 DIAGNOSIS — F64.9 GENDER DYSPHORIA: ICD-10-CM

## 2022-02-09 PROCEDURE — 17999 UNLISTD PX SKN MUC MEMB SUBQ: CPT | Mod: 58 | Performed by: DERMATOLOGY

## 2022-02-09 ASSESSMENT — PAIN SCALES - GENERAL: PAINLEVEL: NO PAIN (0)

## 2022-02-09 NOTE — PROCEDURES
Laser Hair Removal Gentle Max Prox (755/1064nm)  Procedure Date: 2022    Staff Surgeon: Dr.Adam Fischer   Assistant: Yu Grajeda RN     Trimming required prior to treatment in clinic?: yes pt shaved prior to the procedure   Barba skin type: II  Diagnosis/Treatment Reason: gender dysphoria    Treatment#: 3     Test Area Settings:   Wavelength(755/1064nm): 755  Location: left upper cheek   Fluence: 26J   Spot size: 15mm  Pulse width:  3 mS ( mS)   Total Number of Pulses: 2  Dynamic cooling spray settin mS  Dynamic cooling device delay:  40 mS    Laser Settings:  Wavelength(755/1064nm): 755  Location: face and neck   Fluence: 26J   Spot size: 15mm  Pulse width:  3 mS ( mS)   Total Number of Pulses: 138  Dynamic cooling spray settin mS  Dynamic cooling device delay:  40 mS      Anesthesia:  BLT applied to face and neck      BLT     The following medication was given:     MEDICATION: Benzocaine 20%200mg/g / Lidocaine 6% 60mg/g / Tetracaine 4% (40mg/g)   ROUTE: Topical   SITE: see procedure note  DOSE: 5gms  LOT #: [id number]  : [employer]gent  EXPIRATION DATE: 2022    Was there drug waste? No  Multi-dose vial: Yes      Yu Grajeda RN  2022        Javier used?:No  Ice used?: Yes    Description of Operation/Procedure:   The risks were again reviewed including skin hyperpigmentation, hypopigmentation, scarring, bruising, and blistering. Reviewed white and red hair do not respond. Reviewed hair may regrow as it is not permanent but a reduction in hair growth.  Reviewed that a 3 months waiting period prior to surgery to recommended. Reviewed that the patient can terminate the procedure at anytime.     An operative consent were obtained. Time-out was performed.  The patient was positioned to optimally expose the area treated. Dynamic cooling was verified and functioning properly.  Protective eyewear was worn by the patient and goggles on all personnel in the treatment  room.  The patient confirmed the site to be treated. The laser energy output was verified by meter reading.  N95 and buffalo filtration was used.     The clinically evident lesion(s) was/were treated with laser beam as above with 1 pass.  The patient tolerated the procedure well and no complications were noted. Post operative instructions were provided. The total laser operation and preparation time was 6 minutes.  The patient was counseled to contact us immediately for any concerns. The patient was offered and recommended to read their after visit summary.       The patient will follow-up in  6 weeks.  Billed to insurance  Dr. Fischer staffed the patient was present for identifying and marking target area(s),, ascertainment of protective equipment utilization,, calibration of laser, and treatment of a  portion of the lesion,    Staff Involved:  RN performed the entire procedure..Yu Grajeda RN    Staff Physician Comments:   I saw and evaluated the patient with the RN (Yu Grajeda RN).  I agree with the assessment and plan and description of the procedure as above.   I personally approved the laser settings and treatment area.   I was present for the test spots. I was immediately available at all times.     Daniel Fischer DO    Department of Dermatology  [facility]: Phone: [phone], Fax:[phone]  [facility]: Phone: [phone], Fax: [phone]        Dermatology Laser Intake Checklist:  History of psoriasis: No  History of recent tan, indoor or outdoor tanning/vacation or other sun exposure: No  History of vitiligo: No  Family history of vitiligo: No  Recent other cosmetic procedure(microderm abrasion/peel/hair removal/facial etc): No  History of HSV: No  Did the patient start valtrex: No  For genital laser hair removal patient only: Is there a history of genital warts or  condyloma: No  Tattoo in the area to be treated: No  Is patient using hydroquinone: No  Retinoids and other acne medications stopped for 2 weeks: No  Has the patient had accutane in the last 6-12 months: No  Pregnant or breastfeeding: No  History of skin cancer in area planned for treatment: No  History of treatment with gold: No  Changes in medical history: No  Photos obtained:   Does the patient smoke: No  Is the patient on ibuprofen/aspirin/plavix/coumadin/other blood thinner: No  If patient is taking narcotic or diazepam(valium)-does patient have : No  There were no vitals taken for this visit.

## 2022-02-09 NOTE — TELEPHONE ENCOUNTER
Nurse called requesting clarification on a prior authorization for LHR services scheduled today, 2/9/2022. States patient called insurance and received information that caused them to worry if the appointment for LHR was secure.     In 2021 a prior authorization was required from [facility] for this service, Laser Hair Removal, CPT code 05542. A previous authorization was obtained for services preformed in 2021.   Utilizing the tools available for [employer] it was determined at this point in time  CPT code 16931 does not require prior authorization. After hearing the concerns of the patient and provider team another search was performed along with an additional call to [employer] to verify that prior authorization is not required for the 2/9/2022 appointment.     PB DOS: 2/9/2022  Type of Procedure: Laser Hair Removal  CPT Codes: 34615  Surgeon/Ordering provider: Dr Tolentino  Pre-cert/Authorization completed:  No PA required   Payer: [employer] [place]  Spoke to Ac FRANKLIN at [employer]  Checked [facility] PA list and Availity  Ref. # [id number]     Called patient to update them no prior authorization was required. Pt was given an overview of the prior authorization process, we also discussed the necessity of knowing their benefits and coverage details and possible resources they can utilize to gain that information. Patient was given my direct number to contact if they should have any further questions. Outcome message sent to nurse.    SHARON Chavez  Financial Counselor  [facility]  [address] [place] [address]  [phone]

## 2022-02-09 NOTE — LETTER
2/9/2022         RE: Osman Castellano  [address]  [facility] [address]        Dear Colleague,    Thank you for referring your patient, Osman Castellano, to the [facility]. Please see a copy of my visit note below.    The patient presents for laser hair removal.   See procedure note for details.     Daniel Fischre DO    Department of Dermatology  [facility]: Phone: [phone], Fax:[phone]  [facility]: Phone: [phone], Fax: [phone]        Again, thank you for allowing me to participate in the care of your patient.        Sincerely,        Daniel Fischer MD

## 2022-02-09 NOTE — PROGRESS NOTES
The patient presents for laser hair removal.   See procedure note for details.     Daniel Fischer DO    Department of Dermatology  [facility]: Phone: [phone], Fax:[phone]  [facility]: Phone: [phone], Fax: [phone]

## 2022-02-10 ENCOUNTER — TELEPHONE (OUTPATIENT)
Dept: FAMILY MEDICINE | Facility: CLINIC | Age: 32
End: 2022-02-10
Payer: COMMERCIAL

## 2022-02-10 NOTE — TELEPHONE ENCOUNTER
Prior Authorization Retail Medication Request    Medication/Dose: estradiol (CLIMARA) 0.1 MG/24HR weekly patch  ICD code (if different than what is on RX):  Gender dysphoria [F64.9]   Previously Tried and Failed:  See chart  Rationale:  See chart    Insurance Name:  [employer] [place]  Insurance ID:  [id number]       Pharmacy Information (if different than what is on RX)  Name:  [facility]  Phone:  [phone]

## 2022-02-12 ENCOUNTER — HEALTH MAINTENANCE LETTER (OUTPATIENT)
Age: 32
End: 2022-02-12

## 2022-02-17 NOTE — TELEPHONE ENCOUNTER
PA Initiation    Medication: estradiol (CLIMARA) 0.1 MG/24HR weekly patch  Insurance Company:    Pharmacy Filling the Rx: [id number] - [facility] [address] AT [facility] [address] [facility]  Filling Pharmacy Phone: [phone]  Filling Pharmacy Fax:    Start Date: 2/17/2022

## 2022-02-17 NOTE — TELEPHONE ENCOUNTER
Prior Authorization Approval    Authorization Effective Date: 3/12/2022  Authorization Expiration Date: 3/12/2023  Medication: estradiol (CLIMARA) 0.1 MG/24HR weekly patch  Approved Dose/Quantity: 24   Reference #:     Insurance Company:    Expected CoPay:       CoPay Card Available:      Foundation Assistance Needed:    Which Pharmacy is filling the prescription (Not needed for infusion/clinic administered): [id number] - [place], [place] - [address] [facility] [address]  Pharmacy Notified: Yes  Patient Notified: YesComment:  pharmacy will notify

## 2022-04-15 ENCOUNTER — OFFICE VISIT (OUTPATIENT)
Dept: DERMATOLOGY | Facility: CLINIC | Age: 32
End: 2022-04-15
Payer: COMMERCIAL

## 2022-04-15 DIAGNOSIS — Z29.9 PROPHYLACTIC MEASURE: Primary | ICD-10-CM

## 2022-04-15 DIAGNOSIS — F64.9 GENDER DYSPHORIA: ICD-10-CM

## 2022-04-15 PROCEDURE — 99207 PR NO CHARGE NURSE ONLY: CPT | Performed by: DERMATOLOGY

## 2022-04-15 PROCEDURE — 17999 UNLISTD PX SKN MUC MEMB SUBQ: CPT | Mod: 58 | Performed by: DERMATOLOGY

## 2022-04-15 RX ORDER — FLUOCINONIDE 0.5 MG/G
CREAM TOPICAL
Qty: 60 G | Refills: 0 | Status: SHIPPED | OUTPATIENT
Start: 2022-04-15 | End: 2022-12-07

## 2022-04-15 ASSESSMENT — PAIN SCALES - GENERAL: PAINLEVEL: NO PAIN (0)

## 2022-04-15 NOTE — NURSING NOTE
Osman Castellano's goals for this visit include:   Chief Complaint   Patient presents with     Laser Treatment     LHR # 4 face and neck           Dermatology Laser Intake Checklist:  History of psoriasis: No  History of recent tan, indoor or outdoor tanning/vacation or other sun exposure: No  History of vitiligo: No  Family history of vitiligo: No  Recent other cosmetic procedure(microderm abrasion/peel/hair removal/facial etc): No  History of HSV: yes but has not had one in >10 years.  Did the patient start valtrex: No  For genital laser hair removal patient only: Is there a history of genital warts or condyloma: No  Tattoo in the area to be treated: No  Is patient using hydroquinone: No  Retinoids and other acne medications stopped for 2 weeks: No  Has the patient had accutane in the last 6-12 months: No  Pregnant or breastfeeding: No  History of skin cancer in area planned for treatment: No  History of treatment with gold: No  Changes in medical history: No  Photos obtained:   Does the patient smoke: No  Is the patient on ibuprofen/aspirin/plavix/coumadin/other blood thinner: yes took 600mg of ibuprofen today for back pain   If patient is taking narcotic or diazepam(valium)-does patient have : No      Helena Ferguson RN

## 2022-04-15 NOTE — LETTER
4/15/2022         RE: Osman Castellano  [address]  [facility] [address]        Dear Colleague,    Thank you for referring your patient, Osman Castellano, to the [facility]. Please see a copy of my visit note below.    See procedure note      Again, thank you for allowing me to participate in the care of your patient.        Sincerely,        Sera Mccullough MD

## 2022-04-15 NOTE — PATIENT INSTRUCTIONS
Gentle Max Laser Hair Reduction Instructions    Laser hair reduction: I will have redness, pain and swelling. I may have skin discoloration, bruising and itching. Risks are permenant discoloration, hives, infection scarring, eye injury,hair growth, and blister. The outcome could be no improvement or slight improvement. Multiple treatments are required. All hair will not be removed.     Before the procedure:  Sun Protection:  Do not allow the area to become tan or come in with a tan for a treatment. Tans will increased the risks of the procedure. Do not use spray or any over counter product self tanners prior to the procedure.     Shaving:   Gently shave the area the evening before the procedure.     Other:  Avoid any retinols such as Differin, adapalene, retinol or tretinoin to the treated area 1 week prior. Hold bleaching creams such as hydroquinone until 1 week prior.  Avoid any hair removal procedures such as waxing, electrolysis or other lasers on the skin within 6 weeks prior. Do not have any other cosmetic procedures done on the area within the prior 6 weeks such as chemical peels or dermabrasion.      Post Procedure:  Day 1-7  The healing time for any given treatment varies between clients. The following represents the general recovery phases you might expect. Individual clients may experience variations from this course.     Swelling/Discomfort/Redness:  The most common side effects are erythema and edema (redness and swelling) which generally occur immediately after and typically resolve within 48 hours. If crusting and blistering occurs call the clinic.     Activity:  Avoid swimming the day of laser hair removal treatment. Also, no rigorous exercise the day of treatment.     Moisturizers and Sunscreens:  Wait until the skin has returned to normal to start topical products.     Sun Protection:  Do not allow the area to become tan or come in with a tan for a treatment. Tans will increased the risks of the  procedure. Do not use spray or any over counter product self tanners prior to the procedure.     Warning signs:  Call the clinic if you have significant pain, increasing redness, pus, blisters/crusting or for any other concerns.     Who should I call with questions?  [facility]: [phone]  [facility]: [phone]  For urgent needs outside of business hours call the [facility] at [phone] and ask for the dermatology resident on call  [employer] messaging response may be delayed by several days

## 2022-04-18 ENCOUNTER — MYC MEDICAL ADVICE (OUTPATIENT)
Dept: DERMATOLOGY | Facility: CLINIC | Age: 32
End: 2022-04-18
Payer: COMMERCIAL

## 2022-04-18 DIAGNOSIS — B00.9 HSV (HERPES SIMPLEX VIRUS) INFECTION: Primary | ICD-10-CM

## 2022-04-18 PROCEDURE — 99207 PR NO BILLABLE SERVICE THIS VISIT: CPT | Mod: 95 | Performed by: DERMATOLOGY

## 2022-04-19 RX ORDER — VALACYCLOVIR HYDROCHLORIDE 1 G/1
2000 TABLET, FILM COATED ORAL 2 TIMES DAILY
Qty: 4 TABLET | Refills: 0 | Status: SHIPPED | OUTPATIENT
Start: 2022-04-19 | End: 2022-09-15

## 2022-04-19 RX ORDER — VALACYCLOVIR HYDROCHLORIDE 500 MG/1
500 TABLET, FILM COATED ORAL 2 TIMES DAILY
Qty: 20 TABLET | Refills: 0 | Status: SHIPPED | OUTPATIENT
Start: 2022-04-19 | End: 2022-09-15

## 2022-04-19 RX ORDER — VALACYCLOVIR HYDROCHLORIDE 500 MG/1
TABLET, FILM COATED ORAL
Qty: 20 TABLET | Refills: 3 | Status: SHIPPED | OUTPATIENT
Start: 2022-04-19 | End: 2022-06-21

## 2022-04-19 NOTE — TELEPHONE ENCOUNTER
I as able to reach patient.     She will call immediately if this does not heal. She has just one small area impacted

## 2022-04-20 ENCOUNTER — MYC MEDICAL ADVICE (OUTPATIENT)
Dept: DERMATOLOGY | Facility: CLINIC | Age: 32
End: 2022-04-20
Payer: COMMERCIAL

## 2022-04-20 NOTE — TELEPHONE ENCOUNTER
Spoke with patient.  She received the Valtrex prescription to take 2000 mg twice a day for 1 day, and the second prescription for 500 mg twice daily for 10 days for the next laser treatment.  Discussed with the patient that additional scripts were sent to the pharmacy and they can call the pharmacy for refills when needed prior to laser treatment.

## 2022-04-29 ENCOUNTER — TELEPHONE (OUTPATIENT)
Dept: DERMATOLOGY | Facility: CLINIC | Age: 32
End: 2022-04-29
Payer: COMMERCIAL

## 2022-04-29 DIAGNOSIS — Z29.9 PROPHYLACTIC MEASURE: ICD-10-CM

## 2022-04-29 DIAGNOSIS — B00.9 HSV (HERPES SIMPLEX VIRUS) INFECTION: Primary | ICD-10-CM

## 2022-04-29 RX ORDER — VALACYCLOVIR HYDROCHLORIDE 500 MG/1
TABLET, FILM COATED ORAL
Qty: 20 TABLET | Refills: 4 | Status: SHIPPED | OUTPATIENT
Start: 2022-04-29

## 2022-05-16 ENCOUNTER — OFFICE VISIT (OUTPATIENT)
Dept: DERMATOLOGY | Facility: CLINIC | Age: 32
End: 2022-05-16
Payer: COMMERCIAL

## 2022-05-16 DIAGNOSIS — F64.9 GENDER DYSPHORIA: ICD-10-CM

## 2022-05-16 PROCEDURE — 99207 PR NO CHARGE NURSE ONLY: CPT | Performed by: DERMATOLOGY

## 2022-05-16 PROCEDURE — 17999 UNLISTD PX SKN MUC MEMB SUBQ: CPT | Mod: 58 | Performed by: DERMATOLOGY

## 2022-05-16 ASSESSMENT — PAIN SCALES - GENERAL: PAINLEVEL: NO PAIN (0)

## 2022-05-16 NOTE — PROGRESS NOTES
See procedure note.     Philip Tolentino MD  Pronouns: he/him/his    Department of Dermatology  [facility]: Phone: [phone], Fax:[phone]  [facility]: Phone: [phone] Fax: [phone]

## 2022-05-16 NOTE — PROCEDURES
Laser Hair Removal Gentle Max Prox (755/1064nm)755nm  Procedure Date: May 16, 2022    Staff Surgeon: Dr Tolentino    Resident Surgeon: andria    Assistant: Helena Ferguson RN    Trimming required prior to treatment in clinic?: yes and shaved last night  Barba skin type: II  Diagnosis/Treatment Reason:gender dysphoria    Treatment#: 5     Test Area Settings:   Wavelength(755/1064nm): 755nm  Location: left cheek  Fluence: 28J   Spot size: 15mm  Pulse width:  3 mS ( mS)   Total Number of Pulses: 2  Dynamic cooling spray settin mS  Dynamic cooling device delay:  40 mS    Laser Settings:  Wavelength(755/1064nm): 755nm  Location: face/neck  Fluence: 28J   Spot size: 15mm  Pulse width:  3 mS ( mS)   Total Number of Pulses: 133  Dynamic cooling spray settin mS  Dynamic cooling device delay:  40 mS    Anesthesia:  The following medication was applied to skin:    MEDICATION: Benzocaine 20%/Lidocaine 6%/Tetracaine 4%  ROUTE: Topical   SITE: face/neck  DOSE: 5 grams  LOT #: [id number]  : Alessandra  EXPIRATION DATE: 2022  Was there drug waste? No  Multi-dose vial: Yes      Javier used?:No  Ice used?: Yes    Description of Operation/Procedure:     for return laser hair removal patients.The risks were again reviewed including skin hyperpigmentation, hypopigmentation, scarring, bruising, and blistering. Reviewed white and red hair do not respond. Reviewed hair may regrow as it is not permanent but a reduction in hair growth.  Reviewed that a 3 months waiting period prior to surgery to recommended. Reviewed that the patient can terminate the procedure at anytime.     A photo(s) and operative consent were obtained. Time-out was performed.  The patient was positioned to optimally expose the area treated. Dynamic cooling was verified and functioning properly.  Protective eyewear was worn by the patient and goggles on all personnel in the treatment room.  The patient confirmed the site to be treated. The  laser energy output was verified by meter reading.  N95 and buffalo filtration was used.     The clinically evident lesion(s) was/were treated with laser beam as above with 1 pass.  The patient tolerated the procedure well and no complications were noted. Post operative instructions were provided. The total laser operation and preparation time was 10 minutes.  The patient was counseled to contact us immediately for any concerns. The patient was offered and recommended to read their after visit summary.     Numeric pain scale after treatment: 8/10    The patient will follow-up in 6-8 weeks and has been scheduled.    Dr. Tolentino staffed the patient was present for identifying and marking target area(s),, ascertainment of protective equipment utilization,, calibration of laser, and treatment of 1 area(s),    Staff Involved:  Staff Only  Helena Ferguson RN    Staff Physician Comments:   I saw and evaluated the patient with the RN and I agree with the assessment and plan.  I was present for the key portions of the above major procedure and examination, including identifying and marking target area(s), ascertainment of protective equipment utilization, calibration of laser, and for part of the procedure.     Pihlip Tolentino MD  Pronouns: he/him/his    Department of Dermatology  [facility]: Phone: [phone], Fax:[phone]  [facility]: Phone: [phone] Fax: [phone]

## 2022-05-16 NOTE — LETTER
5/16/2022         RE: Osman Castellano  [address]  [facility] [address]        Dear Colleague,    Thank you for referring your patient, Osman Castellano, to the [facility]. Please see a copy of my visit note below.    See procedure note.     Philip Tolentino MD  Pronouns: he/him/his    Department of Dermatology  [facility]: Phone: [phone], Fax:[phone]  [facility]: Phone: [phone] Fax: [phone]        Again, thank you for allowing me to participate in the care of your patient.        Sincerely,        Philip Tolentino MD

## 2022-05-16 NOTE — PATIENT INSTRUCTIONS
Gentle Max Laser Hair Reduction Instructions    Laser hair reduction: I will have redness, pain and swelling. I may have skin discoloration, bruising and itching. Risks are permenant discoloration, hives, infection scarring, eye injury,hair growth, and blister. The outcome could be no improvement or slight improvement. Multiple treatments are required. All hair will not be removed.     Before the procedure:  Sun Protection:  Do not allow the area to become tan or come in with a tan for a treatment. Tans will increased the risks of the procedure. Do not use spray or any over counter product self tanners prior to the procedure.     Shaving:   Gently shave the area the evening before the procedure.     Other:  Avoid any retinols such as Differin, adapalene, retinol or tretinoin to the treated area 1 week prior. Hold bleaching creams such as hydroquinone until 1 week prior.  Avoid any hair removal procedures such as waxing, electrolysis or other lasers on the skin within 6 weeks prior. Do not have any other cosmetic procedures done on the area within the prior 6 weeks such as chemical peels or dermabrasion.      Post Procedure:  Day 1-7  The healing time for any given treatment varies between clients. The following represents the general recovery phases you might expect. Individual clients may experience variations from this course.     Swelling/Discomfort/Redness:  The most common side effects are erythema and edema (redness and swelling) which generally occur immediately after and typically resolve within 48 hours. If crusting and blistering occurs call the clinic.     Activity:  Avoid swimming the day of laser hair removal treatment. Also, no rigorous exercise the day of treatment.     Moisturizers and Sunscreens:  Wait until the skin has returned to normal to start topical products.     Sun Protection:  Do not allow the area to become tan or come in with a tan for a treatment. Tans will increased the risks of the  procedure. Do not use spray or any over counter product self tanners prior to the procedure.     Warning signs:  Call the clinic if you have significant pain, increasing redness, pus, blisters/crusting or for any other concerns.     Who should I call with questions?  [facility]: [phone]  [facility]: [phone]  For urgent needs outside of business hours call the [facility] at [phone] and ask for the dermatology resident on call  "[employer]"t messaging response may be delayed by several days

## 2022-05-16 NOTE — NURSING NOTE
Osman Castellano's goals for this visit include:   Chief Complaint   Patient presents with     Laser Treatment     LHR # 5 for face/neck           Dermatology Laser Intake Checklist:  History of psoriasis: No  History of recent tan, indoor or outdoor tanning/vacation or other sun exposure: No  History of vitiligo: No  Family history of vitiligo: No  Recent other cosmetic procedure(microderm abrasion/peel/hair removal/facial etc): No  History of HSV: yes but has not had one in >10 years but did get a cold sore from last laser tx.  Did the patient start valtrex:yes started on 05/13/2022   For genital laser hair removal patient only: Is there a history of genital warts or condyloma: No  Tattoo in the area to be treated: No  Is patient using hydroquinone: No  Retinoids and other acne medications stopped for 2 weeks: No  Has the patient had accutane in the last 6-12 months: No  Pregnant or breastfeeding: No  History of skin cancer in area planned for treatment: No  History of treatment with gold: No  Changes in medical history: No  Photos obtained:   Does the patient smoke: No  Is the patient on ibuprofen/aspirin/plavix/coumadin/other blood thinner: yes took 600mg of ibuprofen today for back pain   If patient is taking narcotic or diazepam(valium)-does patient have : No      Helena Ferguson RN

## 2022-05-25 DIAGNOSIS — F64.9 GENDER DYSPHORIA: ICD-10-CM

## 2022-05-25 RX ORDER — ESTRADIOL 0.1 MG/D
2 PATCH TRANSDERMAL WEEKLY
Qty: 8 PATCH | Refills: 0 | Status: SHIPPED | OUTPATIENT
Start: 2022-05-25 | End: 2022-06-21

## 2022-06-04 ENCOUNTER — HEALTH MAINTENANCE LETTER (OUTPATIENT)
Age: 32
End: 2022-06-04

## 2022-06-07 ENCOUNTER — LAB (OUTPATIENT)
Dept: LAB | Facility: CLINIC | Age: 32
End: 2022-06-07
Payer: COMMERCIAL

## 2022-06-07 DIAGNOSIS — E11.29 TYPE 2 DIABETES MELLITUS WITH MICROALBUMINURIA, WITHOUT LONG-TERM CURRENT USE OF INSULIN (H): ICD-10-CM

## 2022-06-07 DIAGNOSIS — F64.9 GENDER DYSPHORIA: ICD-10-CM

## 2022-06-07 DIAGNOSIS — R80.9 TYPE 2 DIABETES MELLITUS WITH MICROALBUMINURIA, WITHOUT LONG-TERM CURRENT USE OF INSULIN (H): ICD-10-CM

## 2022-06-07 LAB
ALBUMIN SERPL-MCNC: 3.5 G/DL (ref 3.4–5)
ALP SERPL-CCNC: 32 U/L (ref 40–150)
ALT SERPL W P-5'-P-CCNC: 27 U/L (ref 0–70)
ANION GAP SERPL CALCULATED.3IONS-SCNC: 5 MMOL/L (ref 3–14)
AST SERPL W P-5'-P-CCNC: 16 U/L (ref 0–45)
BILIRUB SERPL-MCNC: 0.2 MG/DL (ref 0.2–1.3)
BUN SERPL-MCNC: 12 MG/DL (ref 7–30)
CALCIUM SERPL-MCNC: 8.9 MG/DL (ref 8.5–10.1)
CHLORIDE BLD-SCNC: 106 MMOL/L (ref 94–109)
CHOLEST SERPL-MCNC: 162 MG/DL
CO2 SERPL-SCNC: 27 MMOL/L (ref 20–32)
CREAT SERPL-MCNC: 0.81 MG/DL (ref 0.52–1.25)
FASTING STATUS PATIENT QL REPORTED: YES
GFR SERPL CREATININE-BSD FRML MDRD: >90 ML/MIN/1.73M2
GLUCOSE BLD-MCNC: 130 MG/DL (ref 70–99)
HDLC SERPL-MCNC: 30 MG/DL
HGB BLD-MCNC: 12.5 G/DL (ref 11.7–17.7)
LDLC SERPL CALC-MCNC: 75 MG/DL
NONHDLC SERPL-MCNC: 132 MG/DL
POTASSIUM BLD-SCNC: 4.3 MMOL/L (ref 3.4–5.3)
PROT SERPL-MCNC: 7.1 G/DL (ref 6.8–8.8)
SODIUM SERPL-SCNC: 138 MMOL/L (ref 133–144)
TRIGL SERPL-MCNC: 283 MG/DL

## 2022-06-07 PROCEDURE — 84403 ASSAY OF TOTAL TESTOSTERONE: CPT

## 2022-06-07 PROCEDURE — 80061 LIPID PANEL: CPT

## 2022-06-07 PROCEDURE — 36415 COLL VENOUS BLD VENIPUNCTURE: CPT

## 2022-06-07 PROCEDURE — 80053 COMPREHEN METABOLIC PANEL: CPT

## 2022-06-07 PROCEDURE — 85018 HEMOGLOBIN: CPT

## 2022-06-09 LAB — TESTOST SERPL-MCNC: 52 NG/DL (ref 8–950)

## 2022-06-15 DIAGNOSIS — Z51.81 ENCOUNTER FOR THERAPEUTIC DRUG MONITORING: ICD-10-CM

## 2022-06-15 DIAGNOSIS — F64.9 GENDER DYSPHORIA: Primary | ICD-10-CM

## 2022-06-15 DIAGNOSIS — E11.29 TYPE 2 DIABETES MELLITUS WITH MICROALBUMINURIA, WITHOUT LONG-TERM CURRENT USE OF INSULIN (H): ICD-10-CM

## 2022-06-15 DIAGNOSIS — R80.9 TYPE 2 DIABETES MELLITUS WITH MICROALBUMINURIA, WITHOUT LONG-TERM CURRENT USE OF INSULIN (H): ICD-10-CM

## 2022-06-21 ENCOUNTER — OFFICE VISIT (OUTPATIENT)
Dept: FAMILY MEDICINE | Facility: CLINIC | Age: 32
End: 2022-06-21
Payer: COMMERCIAL

## 2022-06-21 VITALS
TEMPERATURE: 99.2 F | WEIGHT: 293 LBS | OXYGEN SATURATION: 99 % | BODY MASS INDEX: 43.4 KG/M2 | HEART RATE: 84 BPM | HEIGHT: 69 IN | SYSTOLIC BLOOD PRESSURE: 136 MMHG | DIASTOLIC BLOOD PRESSURE: 87 MMHG

## 2022-06-21 DIAGNOSIS — B00.9 HSV (HERPES SIMPLEX VIRUS) INFECTION: ICD-10-CM

## 2022-06-21 DIAGNOSIS — Z23 ENCOUNTER FOR IMMUNIZATION: ICD-10-CM

## 2022-06-21 DIAGNOSIS — Z51.81 ENCOUNTER FOR THERAPEUTIC DRUG MONITORING: ICD-10-CM

## 2022-06-21 DIAGNOSIS — F64.9 GENDER DYSPHORIA: ICD-10-CM

## 2022-06-21 DIAGNOSIS — R80.9 TYPE 2 DIABETES MELLITUS WITH MICROALBUMINURIA, WITHOUT LONG-TERM CURRENT USE OF INSULIN (H): Primary | ICD-10-CM

## 2022-06-21 DIAGNOSIS — Z78.9 NONIMMUNE TO HEPATITIS B VIRUS: ICD-10-CM

## 2022-06-21 DIAGNOSIS — E11.29 TYPE 2 DIABETES MELLITUS WITH MICROALBUMINURIA, WITHOUT LONG-TERM CURRENT USE OF INSULIN (H): ICD-10-CM

## 2022-06-21 DIAGNOSIS — E11.29 TYPE 2 DIABETES MELLITUS WITH MICROALBUMINURIA, WITHOUT LONG-TERM CURRENT USE OF INSULIN (H): Primary | ICD-10-CM

## 2022-06-21 DIAGNOSIS — R80.9 TYPE 2 DIABETES MELLITUS WITH MICROALBUMINURIA, WITHOUT LONG-TERM CURRENT USE OF INSULIN (H): ICD-10-CM

## 2022-06-21 LAB
CREAT UR-MCNC: 30.3 MG/DL
HBA1C MFR BLD: 6.8 % (ref 0–5.6)
MICROALBUMIN UR-MCNC: <12 MG/L
MICROALBUMIN/CREAT UR: NORMAL MG/G{CREAT}

## 2022-06-21 PROCEDURE — 82043 UR ALBUMIN QUANTITATIVE: CPT | Performed by: STUDENT IN AN ORGANIZED HEALTH CARE EDUCATION/TRAINING PROGRAM

## 2022-06-21 PROCEDURE — 36415 COLL VENOUS BLD VENIPUNCTURE: CPT | Performed by: STUDENT IN AN ORGANIZED HEALTH CARE EDUCATION/TRAINING PROGRAM

## 2022-06-21 PROCEDURE — 83036 HEMOGLOBIN GLYCOSYLATED A1C: CPT | Performed by: STUDENT IN AN ORGANIZED HEALTH CARE EDUCATION/TRAINING PROGRAM

## 2022-06-21 PROCEDURE — 90471 IMMUNIZATION ADMIN: CPT | Performed by: STUDENT IN AN ORGANIZED HEALTH CARE EDUCATION/TRAINING PROGRAM

## 2022-06-21 PROCEDURE — 99214 OFFICE O/P EST MOD 30 MIN: CPT | Mod: 25 | Performed by: STUDENT IN AN ORGANIZED HEALTH CARE EDUCATION/TRAINING PROGRAM

## 2022-06-21 PROCEDURE — 82670 ASSAY OF TOTAL ESTRADIOL: CPT | Mod: KX | Performed by: STUDENT IN AN ORGANIZED HEALTH CARE EDUCATION/TRAINING PROGRAM

## 2022-06-21 PROCEDURE — 90746 HEPB VACCINE 3 DOSE ADULT IM: CPT | Performed by: STUDENT IN AN ORGANIZED HEALTH CARE EDUCATION/TRAINING PROGRAM

## 2022-06-21 RX ORDER — ESTRADIOL 0.1 MG/D
2 PATCH TRANSDERMAL WEEKLY
Qty: 8 PATCH | Refills: 3 | Status: SHIPPED | OUTPATIENT
Start: 2022-06-21 | End: 2022-08-03

## 2022-06-21 RX ORDER — VALACYCLOVIR HYDROCHLORIDE 500 MG/1
TABLET, FILM COATED ORAL
Qty: 20 TABLET | Refills: 3 | Status: SHIPPED | OUTPATIENT
Start: 2022-06-21

## 2022-06-21 NOTE — PROGRESS NOTES
Preceptor Attestation:  Patient seen and evaluated in person. I discussed the patient with the resident. I have verified the content of the note, which accurately reflects my assessment of the patient and the plan of care.   Supervising Physician:  Jillian Marino DO

## 2022-06-21 NOTE — PATIENT INSTRUCTIONS
Thank you for coming in today,    I am so sorry these last months have been rough for you.  I am glad that all her pieces are on the table and you have the things you need, please let us know if there is other support that we can be offering you.    We will see each other in 3 months, will finish the hepatitis B series as scheduled with Angie and due before you left.  After that hepatitis B series is complete we will wait a couple months and then I will check your levels to see if you are immune.  We will check in in 3 months for repeat lab check.    Let me know if you have any questions or concerns.     Sima Gilbert MD

## 2022-06-21 NOTE — PROGRESS NOTES
Assessment & Plan     Gender dysphoria  Encounter for therapeutic drug monitoring  Patient is seeing us for HRT, we are carefully monitoring estradiol effect.  Testosterone is slightly increased but still within largely suppressed range.  Still having some darker hair than she would prefer.  We will recheck estrogen level today, we are likely moving towards starting an injectable form as well.  Discussed usual timeframe for starting progesterone, ideal timeline would be 18 to 24 months after starting estrogen, this would be in September or so for patient.  We will discuss starting this at next visit.  Discussed possible and more common side effects of progesterone.  - Estradiol; Future  - Hemoglobin A1c; Future  - Estradiol  - Hemoglobin A1c  - estradiol (CLIMARA) 0.1 MG/24HR weekly patch; Place 2 patches onto the skin once a week    HSV (herpes simplex virus) infection  Patient requesting refill of this medication.  LFTs within goal range, this is a reasonable request and has been refilled.  - valACYclovir (VALTREX) 500 MG tablet; Start 500mg twice daily 2 days BEFORE NEXT LASER CASE, take for ten days.    Type 2 diabetes mellitus with microalbuminuria, without long-term current use of insulin (H)  Patient with history of type 2 diabetes that is significantly better than when they presented.  They have had a rough few months, loss of transportation, having to reschedule and get their birth certificate, preparing for legal name change.  They do have a therapist and other supports, however they do note that they have not been taking his good care of themselves as they usually try to.  They are now back on their meal planning, feel like in 3 months her labs are good to be much better.  We did review the labs that we have, A1c was not collected at last lab visit however other than a more elevated triglycerides all other labs look stable to improved.  We will recheck A1c today, recheck again in 3 months per  "standard.  - Hemoglobin A1c; Future  - Albumin Random Urine Quantitative with Creat Ratio; Future  - Hemoglobin A1c  - Albumin Random Urine Quantitative with Creat Ratio      Encounter for immunization  Nonimmune to hepatitis B virus  Patient is not immune to the hepatitis B virus, we will start a 3 series hepatitis B getting shot 1 of 3 today.  We will schedule for the next 2 in nurse visits.  Would recheck 1 month after for seroconversion or not.  If patient has not responded would classify as nonconverter.  - HEPATITIS B VACCINE,ADULT,IM    Patient is a member of the LGBT community, this is a social determinants of health that impacts how they interact with the medical system.    No follow-ups on file.    Sima Gilbert MD  [facility] LOLIS Duncan is a 32 year old, presenting for the following health issues:  Follow Up (HRT , consult on Hep B inoculation, labs for estradiol and A1c , )      HPI     She has had a rough few months- needed to get 's license/birth certificate for name change, legal name change process, doesn't have a car and is working on getting one. Fell off meal prep for awhile, getting back on now. Anticipating her diabetes labs will be off goal trajectory. Still having some facial and other hair that is darker and thicker than desired.     Would like to consider starting progesterone in near future. Interested in someday switching to injectable Estrogen.     Review of Systems   See HPI      Objective    /87   Pulse 84   Temp 99.2  F (37.3  C)   Ht 1.74 m (5' 8.5\")   Wt (!) 163.7 kg (360 lb 12.8 oz)   SpO2 99%   BMI 54.06 kg/m    Body mass index is 54.06 kg/m .  Physical Exam   Gen: Alert and appropriate human well kempt in NAD   Speaking in full sentences on room air  Affect appropriate to conversation and situation    No results found for this or any previous visit (from the past 24 hour(s)).      .  ..  "

## 2022-06-22 LAB — ESTRADIOL SERPL-MCNC: 83 PG/ML

## 2022-06-27 ENCOUNTER — OFFICE VISIT (OUTPATIENT)
Dept: DERMATOLOGY | Facility: CLINIC | Age: 32
End: 2022-06-27
Payer: COMMERCIAL

## 2022-06-27 DIAGNOSIS — F64.9 GENDER DYSPHORIA: ICD-10-CM

## 2022-06-27 PROCEDURE — 17999 UNLISTD PX SKN MUC MEMB SUBQ: CPT | Mod: 58 | Performed by: DERMATOLOGY

## 2022-06-27 PROCEDURE — 99207 PR NO CHARGE NURSE ONLY: CPT | Performed by: DERMATOLOGY

## 2022-06-27 NOTE — LETTER
6/27/2022         RE: Osman Castellano  [address]  [place] [address]        Dear Colleague,    Thank you for referring your patient, Osman Castellano, to the [facility]. Please see a copy of my visit note below.    See procedure note.     Philip Tolentino MD  Pronouns: he/him/his    Department of Dermatology  [facility]: Phone: [phone], Fax:[phone]  [facility]: Phone: [phone] Fax: [phone]        Again, thank you for allowing me to participate in the care of your patient.        Sincerely,        Philip Tolentino MD

## 2022-06-27 NOTE — PROCEDURES
Laser Hair Removal Gentle Max Prox (755nm)  Procedure Date: 2022    Staff Surgeon: Rajan    Resident Surgeon: andria    Assistant: Helena Ferguson RN    Trimming required prior to treatment in clinic?: yes shaved yesterday evening   Barba skin type: II  Diagnosis/Treatment Reason:gender dysphoria    Treatment#: 6    Test Area Settings:   Wavelength(755/1064nm): 755  Location: left chin/cheek  Fluence: 30mj-end point for perifollicular edema  Spot size: 15mm  Pulse width:  3 mS ( mS)   Total Number of Pulses: 2  Dynamic cooling spray settin mS  Dynamic cooling device delay:  40 mS    Laser Settings:  Wavelength(755/1064nm): 755  Location: face/neck  Fluence: 30mj   Spot size: 15mm  Pulse width:  3 mS ( mS)   Total Number of Pulses: 149  Dynamic cooling spray settin mS  Dynamic cooling device delay:  40 mS    Patient will continue on the Valtrex for hx of cold sores.  Patient will also apply the Lidex cream to face twice daily for the 3 days post procedure. Sunscreen and skin care reviewed with patient also.  Verbalized understanding.    Anesthesia:  The following medication was applied to skin:    MEDICATION: Benzocaine 20%/Lidocaine 6%/Tetracaine 4%  ROUTE: Topical   SITE: face/neck  DOSE: 4 grams  LOT #: [id number]  : Alessandra  EXPIRATION DATE: 2022  Was there drug waste? No  Multi-dose vial: Yes    Javier used?:No  Ice used?: Yes    Description of Operation/Procedure:   for return laser hair removal patients.The risks were again reviewed including skin hyperpigmentation, hypopigmentation, scarring, bruising, and blistering. Reviewed white and red hair do not respond. Reviewed hair may regrow as it is not permanent but a reduction in hair growth.  Reviewed that a 3 months waiting period prior to surgery to recommended. Reviewed that the patient can terminate the procedure at anytime.     A operative consent were obtained. Time-out was performed.  The patient was positioned  to optimally expose the area treated. Dynamic cooling was verified and functioning properly.  Protective eyewear was worn by the patient and goggles on all personnel in the treatment room.  The patient confirmed the site to be treated. The laser energy output was verified by meter reading.  N95 and buffalo filtration was used.     The clinically evident lesion(s) was/were treated with laser beam as above with 1 pass.  The patient tolerated the procedure well and no complications were noted. Post operative instructions were provided. The total laser operation and preparation time was 10 minutes.  The patient was counseled to contact us immediately for any concerns. The patient was offered and recommended to read their after visit summary.     Numeric pain scale after treatment: 5/10    The patient will follow-up in 6-8 weeks and the patient is scheduled.    Bill to insurance    Dr. Tolentino staffed the patient was present for identifying and marking target area(s),, ascertainment of protective equipment utilization,, calibration of laser, and treatment of area(s),    Staff Involved:  CORINNE Steele Dr    Staff Physician Comments:   I saw and evaluated the patient with the RN and I agree with the assessment and plan.  I was present for the key portions of the above major procedure and examination, including identifying and marking target area(s), ascertainment of protective equipment utilization, calibration of laser, and for part of the procedure.     Philip Tolentino MD  Pronouns: he/him/his    Department of Dermatology  [facility]: Phone: [phone], Fax:[phone]  [facility]: Phone: [phone] Fax: [phone]

## 2022-06-27 NOTE — PROGRESS NOTES
See procedure note.     Philip Tolentino MD  Pronouns: he/him/his    Department of Dermatology  [facility]: Phone: [phone], Fax:[phone]  [facility]: Phone: [phone] Fax: [phone]

## 2022-06-27 NOTE — NURSING NOTE
Osman Castellano's goals for this visit include:   Chief Complaint   Patient presents with     Laser Treatment     Here for LHR of face ane neck #6           Dermatology Laser Intake Checklist:  History of psoriasis: No  History of recent tan, indoor or outdoor tanning/vacation or other sun exposure: No  History of vitiligo: No  Family history of vitiligo: No  Recent other cosmetic procedure(microderm abrasion/peel/hair removal/facial etc): No  History of HSV: yes but has not had one in >10 years but did get a cold sore from last laser tx in April 15th 2022  Did the patient start valtrex:yes started on 06/26/2022   For genital laser hair removal patient only: Is there a history of genital warts or condyloma: No  Tattoo in the area to be treated: No  Is patient using hydroquinone: No  Retinoids and other acne medications stopped for 2 weeks: No  Has the patient had accutane in the last 6-12 months: No  Pregnant or breastfeeding: No  History of skin cancer in area planned for treatment: No  History of treatment with gold: No  Changes in medical history: No  Photos obtained:   Does the patient smoke: No  Is the patient on ibuprofen/aspirin/plavix/coumadin/other blood thinner: yes took 600mg of ibuprofen today for back pain   If patient is taking narcotic or diazepam(valium)-does patient have : No      Helena Ferguson RN

## 2022-06-27 NOTE — PATIENT INSTRUCTIONS
Gentle Max Laser Hair Reduction Instructions    Laser hair reduction: I will have redness, pain and swelling. I may have skin discoloration, bruising and itching. Risks are permenant discoloration, hives, infection scarring, eye injury,hair growth, and blister. The outcome could be no improvement or slight improvement. Multiple treatments are required. All hair will not be removed.     Before the procedure:  Sun Protection:  Do not allow the area to become tan or come in with a tan for a treatment. Tans will increased the risks of the procedure. Do not use spray or any over counter product self tanners prior to the procedure.     Shaving:   Gently shave the area the evening before the procedure.     Other:  Avoid any retinols such as Differin, adapalene, retinol or tretinoin to the treated area 1 week prior. Hold bleaching creams such as hydroquinone until 1 week prior.  Avoid any hair removal procedures such as waxing, electrolysis or other lasers on the skin within 6 weeks prior. Do not have any other cosmetic procedures done on the area within the prior 6 weeks such as chemical peels or dermabrasion.      Post Procedure:  Day 1-7  The healing time for any given treatment varies between clients. The following represents the general recovery phases you might expect. Individual clients may experience variations from this course.     Swelling/Discomfort/Redness:  The most common side effects are erythema and edema (redness and swelling) which generally occur immediately after and typically resolve within 48 hours. If crusting and blistering occurs call the clinic.     Activity:  Avoid swimming the day of laser hair removal treatment. Also, no rigorous exercise the day of treatment.     Moisturizers and Sunscreens:  Wait until the skin has returned to normal to start topical products.     Sun Protection:  Do not allow the area to become tan or come in with a tan for a treatment. Tans will increased the risks of the  procedure. Do not use spray or any over counter product self tanners prior to the procedure.     Warning signs:  Call the clinic if you have significant pain, increasing redness, pus, blisters/crusting or for any other concerns.     Who should I call with questions?  [facility]: [phone]  [facility]: [phone]  For urgent needs outside of business hours call the [facility] at [phone] and ask for the dermatology resident on call  [employer] messaging response may be delayed by several days

## 2022-07-19 ENCOUNTER — ALLIED HEALTH/NURSE VISIT (OUTPATIENT)
Dept: FAMILY MEDICINE | Facility: CLINIC | Age: 32
End: 2022-07-19
Payer: COMMERCIAL

## 2022-07-19 DIAGNOSIS — Z23 ENCOUNTER FOR IMMUNIZATION: Primary | ICD-10-CM

## 2022-07-19 PROCEDURE — 90746 HEPB VACCINE 3 DOSE ADULT IM: CPT

## 2022-07-19 PROCEDURE — 90471 IMMUNIZATION ADMIN: CPT

## 2022-08-02 DIAGNOSIS — F64.9 GENDER DYSPHORIA: ICD-10-CM

## 2022-08-02 NOTE — TELEPHONE ENCOUNTER
"Request for medication refill:  estradiol (CLIMARA) 0.1 MG/24HR weekly patch  Providers if patient needs an appointment and you are willing to give a one month supply please refill for one month and  send a letter/MyChart using \".SMILLIMITEDREFILL\" .smillimited and route chart to \"P SMI \" (Giving one month refill in non controlled medications is strongly recommended before denial)    If refill has been denied, meaning absolutely no refills without visit, please complete the smart phrase \".smirxrefuse\" and route it to the \"P SMI MED REFILLS\"  pool to inform the patient and the pharmacy.    Dominique Green MA          "

## 2022-08-03 RX ORDER — ESTRADIOL 0.1 MG/D
2 PATCH TRANSDERMAL WEEKLY
Qty: 8 PATCH | Refills: 3 | Status: SHIPPED | OUTPATIENT
Start: 2022-08-03 | End: 2022-09-15

## 2022-08-08 ENCOUNTER — OFFICE VISIT (OUTPATIENT)
Dept: DERMATOLOGY | Facility: CLINIC | Age: 32
End: 2022-08-08
Payer: COMMERCIAL

## 2022-08-08 DIAGNOSIS — F64.9 GENDER DYSPHORIA: ICD-10-CM

## 2022-08-08 PROCEDURE — 17999 UNLISTD PX SKN MUC MEMB SUBQ: CPT | Mod: 58 | Performed by: DERMATOLOGY

## 2022-08-08 NOTE — LETTER
8/8/2022         RE: Osman Castellano  [address]  [place] [facility] [place] [address]        Dear Colleague,    Thank you for referring your patient, Osman Castellano, to the [facility]. Please see a copy of my visit note below.    The patient presents for laser hair removal.   See procedure note for details.     Daniel Fischer DO    Department of Dermatology  [facility]: Phone: [phone], Fax:[phone]  [facility]: Phone: [phone], Fax: [phone]        Again, thank you for allowing me to participate in the care of your patient.        Sincerely,        Daniel Fischer MD

## 2022-08-08 NOTE — NURSING NOTE
Osman Castellano's goals for this visit include:   Chief Complaint   Patient presents with     Laser Treatment     LHR # 7 face/neck           Dermatology Laser Intake Checklist:  History of psoriasis: No  History of recent tan, indoor or outdoor tanning/vacation or other sun exposure: No  History of vitiligo: No  Family history of vitiligo: No  Recent other cosmetic procedure(microderm abrasion/peel/hair removal/facial etc): No  History of HSV: yes but has not had one in >10 years but did get a cold sore from last laser tx in April 15th 2022  Did the patient start valtrex:yes started on 08/06/2022  For genital laser hair removal patient only: Is there a history of genital warts or condyloma: No  Tattoo in the area to be treated: No  Is patient using hydroquinone: No  Retinoids and other acne medications stopped for 2 weeks: No  Has the patient had accutane in the last 6-12 months: No  Pregnant or breastfeeding: No  History of skin cancer in area planned for treatment: No  History of treatment with gold: No  Changes in medical history: No  Photos obtained:   Does the patient smoke: No  Is the patient on ibuprofen/aspirin/plavix/coumadin/other blood thinner: NO  If patient is taking narcotic or diazepam(valium)-does patient have : No      Helena Ferguson RN

## 2022-08-08 NOTE — PATIENT INSTRUCTIONS
Gentle Max Laser Hair Reduction Instructions    Laser hair reduction: I will have redness, pain and swelling. I may have skin discoloration, bruising and itching. Risks are permenant discoloration, hives, infection scarring, eye injury,hair growth, and blister. The outcome could be no improvement or slight improvement. Multiple treatments are required. All hair will not be removed.     Before the procedure:  Sun Protection:  Do not allow the area to become tan or come in with a tan for a treatment. Tans will increased the risks of the procedure. Do not use spray or any over counter product self tanners prior to the procedure.     Shaving:   Gently shave the area the evening before the procedure.     Other:  Avoid any retinols such as Differin, adapalene, retinol or tretinoin to the treated area 1 week prior. Hold bleaching creams such as hydroquinone until 1 week prior.  Avoid any hair removal procedures such as waxing, electrolysis or other lasers on the skin within 6 weeks prior. Do not have any other cosmetic procedures done on the area within the prior 6 weeks such as chemical peels or dermabrasion.      Post Procedure:  Day 1-7  The healing time for any given treatment varies between clients. The following represents the general recovery phases you might expect. Individual clients may experience variations from this course.     Swelling/Discomfort/Redness:  The most common side effects are erythema and edema (redness and swelling) which generally occur immediately after and typically resolve within 48 hours. If crusting and blistering occurs call the clinic.     Activity:  Avoid swimming the day of laser hair removal treatment. Also, no rigorous exercise the day of treatment.     Moisturizers and Sunscreens:  Wait until the skin has returned to normal to start topical products.     Sun Protection:  Do not allow the area to become tan or come in with a tan for a treatment. Tans will increased the risks of the  procedure. Do not use spray or any over counter product self tanners prior to the procedure.     Warning signs:  Call the clinic if you have significant pain, increasing redness, pus, blisters/crusting or for any other concerns.     Who should I call with questions?  [facility]: [phone]  [facility]: [phone]  For urgent needs outside of business hours call the [facility] at [phone] and ask for the dermatology resident on call  [employer] messaging response may be delayed by several days

## 2022-08-08 NOTE — PROCEDURES
Laser Hair Removal Gentle Max Yyxd380/1064nm)755  Procedure Date: Aug 8, 2022    Staff Surgeon: Aaliyah    Resident Surgeon: andria    Assistant: Helena Ferguson RN      Trimming required prior to treatment in clinic?: yes  Barba skin type: II  Diagnosis/Treatment Reason: gender dysphoria    Treatment#: 7    Test Area Settings:   Wavelength(755/1064nm): 755  Location: chin left side  Fluence: 30J   Spot size: 15mm  Pulse width:  3 mS ( mS)   Total Number of Pulses: 2  Dynamic cooling spray settin mS  Dynamic cooling device delay:  40 mS    Laser Settings:  Wavelength(755/1064nm): 755nm  Location: face/neck  Fluence: 30J   Spot size: 15mm  Pulse width:  3 mS ( mS)   Total Number of Pulses: 169  Dynamic cooling spray settin mS  Dynamic cooling device delay:  40 mS      Anesthesia:  The following medication was applied to skin:    MEDICATION: Benzocaine 20%/Lidocaine 6%/Tetracaine 4%  ROUTE: Topical   SITE: face/neck  DOSE: 5 grams  LOT #: [id number]  : Alessandra  EXPIRATION DATE: 2022  Was there drug waste? No  Multi-dose vial: Yes      Javier used?:No  Ice used?: Yes    Description of Operation/Procedure:       for return laser hair removal patients.The risks were again reviewed including skin hyperpigmentation, hypopigmentation, scarring, bruising, and blistering. Reviewed white and red hair do not respond. Reviewed hair may regrow as it is not permanent but a reduction in hair growth.  Reviewed that a 3 months waiting period prior to surgery to recommended. Reviewed that the patient can terminate the procedure at anytime.       A Time-out was performed.  The patient was positioned to optimally expose the area treated. Dynamic cooling was verified and functioning properly.  Protective eyewear was worn by the patient and goggles on all personnel in the treatment room.  The patient confirmed the site to be treated. The laser energy output was verified by meter reading.  N95 and buffalo  filtration was used.     The clinically evident lesion(s) was/were treated with laser beam as above with 1 pass.  The patient tolerated the procedure well and no complications were noted. Post operative instructions were provided. The total laser operation and preparation time was 10 minutes.  The patient was counseled to contact us immediately for any concerns. The patient was offered and recommended to read their after visit summary.     Numeric pain scale after treatment: 9/10-upper lip  4/10 rest of face/neck    The patient will follow-up in 6-8 weeks and was scheduled for 2 more sessions.    BIll to insurance  Dr. Fischer staffed the patient was present for identifying and marking target area(s),, ascertainment of protective equipment utilization,, calibration of laser, and treatment of 1 area(s),    Staff Involved:  Staff Only  CORINNE Steele Dr      Staff Physician Comments:   I saw and evaluated the patient with the RN (Helena Ferguson RN).  I agree with the assessment and plan and description of the procedure as above.   I personally approved the laser settings and treatment area.   I was present for the test spots. I was immediately available at all times.     Daniel Fischer DO    Department of Dermatology  [facility]: Phone: [phone], Fax:[phone]  [facility]: Phone: [phone], Fax: [phone]

## 2022-08-24 DIAGNOSIS — F64.9 GENDER DYSPHORIA: Primary | ICD-10-CM

## 2022-08-24 DIAGNOSIS — Z51.81 ENCOUNTER FOR THERAPEUTIC DRUG MONITORING: ICD-10-CM

## 2022-08-24 DIAGNOSIS — R80.9 TYPE 2 DIABETES MELLITUS WITH MICROALBUMINURIA, WITHOUT LONG-TERM CURRENT USE OF INSULIN (H): ICD-10-CM

## 2022-08-24 DIAGNOSIS — E11.29 TYPE 2 DIABETES MELLITUS WITH MICROALBUMINURIA, WITHOUT LONG-TERM CURRENT USE OF INSULIN (H): ICD-10-CM

## 2022-08-29 ENCOUNTER — LAB (OUTPATIENT)
Dept: LAB | Facility: CLINIC | Age: 32
End: 2022-08-29
Payer: COMMERCIAL

## 2022-08-29 ENCOUNTER — TELEPHONE (OUTPATIENT)
Dept: FAMILY MEDICINE | Facility: CLINIC | Age: 32
End: 2022-08-29

## 2022-08-29 DIAGNOSIS — F64.9 GENDER DYSPHORIA: ICD-10-CM

## 2022-08-29 DIAGNOSIS — R80.9 TYPE 2 DIABETES MELLITUS WITH MICROALBUMINURIA, WITHOUT LONG-TERM CURRENT USE OF INSULIN (H): ICD-10-CM

## 2022-08-29 DIAGNOSIS — E11.29 TYPE 2 DIABETES MELLITUS WITH MICROALBUMINURIA, WITHOUT LONG-TERM CURRENT USE OF INSULIN (H): ICD-10-CM

## 2022-08-29 DIAGNOSIS — Z51.81 ENCOUNTER FOR THERAPEUTIC DRUG MONITORING: ICD-10-CM

## 2022-08-29 LAB
ALBUMIN SERPL BCG-MCNC: 4.2 G/DL (ref 3.5–5.2)
ALP SERPL-CCNC: 33 U/L (ref 35–129)
ALT SERPL W P-5'-P-CCNC: 15 U/L (ref 10–50)
ANION GAP SERPL CALCULATED.3IONS-SCNC: 11 MMOL/L (ref 7–15)
AST SERPL W P-5'-P-CCNC: 22 U/L (ref 10–50)
BILIRUB SERPL-MCNC: 0.3 MG/DL
BUN SERPL-MCNC: 10.6 MG/DL (ref 6–20)
CALCIUM SERPL-MCNC: 9.4 MG/DL (ref 8.6–10)
CHLORIDE SERPL-SCNC: 98 MMOL/L (ref 98–107)
CHOLEST SERPL-MCNC: 144 MG/DL
CREAT SERPL-MCNC: 0.75 MG/DL (ref 0.51–1.17)
DEPRECATED HCO3 PLAS-SCNC: 26 MMOL/L (ref 22–29)
GFR SERPL CREATININE-BSD FRML MDRD: >90 ML/MIN/1.73M2
GLUCOSE SERPL-MCNC: 139 MG/DL (ref 70–99)
HDLC SERPL-MCNC: 31 MG/DL
HGB BLD-MCNC: 12.6 G/DL (ref 11.7–17.7)
LDLC SERPL CALC-MCNC: 77 MG/DL
NONHDLC SERPL-MCNC: 113 MG/DL
POTASSIUM SERPL-SCNC: 4.9 MMOL/L (ref 3.4–5.3)
PROT SERPL-MCNC: 7.4 G/DL (ref 6.4–8.3)
SODIUM SERPL-SCNC: 135 MMOL/L (ref 136–145)
TRIGL SERPL-MCNC: 178 MG/DL

## 2022-08-29 PROCEDURE — 85018 HEMOGLOBIN: CPT

## 2022-08-29 PROCEDURE — 80053 COMPREHEN METABOLIC PANEL: CPT

## 2022-08-29 PROCEDURE — 80061 LIPID PANEL: CPT

## 2022-08-29 PROCEDURE — 36415 COLL VENOUS BLD VENIPUNCTURE: CPT

## 2022-08-29 PROCEDURE — 84403 ASSAY OF TOTAL TESTOSTERONE: CPT

## 2022-08-29 PROCEDURE — 82043 UR ALBUMIN QUANTITATIVE: CPT

## 2022-08-29 NOTE — TELEPHONE ENCOUNTER
Patient in clinic for lab only, requesting 3rd Hep B. Looking at patient chart and [facility] showed patient not due for another. But they were given the Engerix-B which is a 3 dose series, 3rd dose to be given 16 weeks after the 1st dose per the CDC. First dose was given on 6/21/2022. The earliest date patient could get the 3rd dose is the week of October 10th.    Informed patient, they will schedule a nurse visit for that time. Patient also asked about 2nd COVID booster, I let patient know that per the CDC they are not currently eligible and gave printout of eligibility requirements.    Carolyn Robert RN

## 2022-08-31 LAB
CREAT UR-MCNC: 110 MG/DL
MICROALBUMIN UR-MCNC: 25.9 MG/L
MICROALBUMIN/CREAT UR: 23.55 MG/G CR (ref 0–25)
TESTOST SERPL-MCNC: 69 NG/DL (ref 8–950)

## 2022-09-06 NOTE — PROGRESS NOTES
The patient presents for laser hair removal.   See procedure note for details.     Daniel Fischer DO    Department of Dermatology  [facility]: Phone: [phone], Fax:[phone]  [facility]: Phone: [phone], Fax: [phone]

## 2022-09-15 ENCOUNTER — VIRTUAL VISIT (OUTPATIENT)
Dept: FAMILY MEDICINE | Facility: CLINIC | Age: 32
End: 2022-09-15
Payer: COMMERCIAL

## 2022-09-15 DIAGNOSIS — E11.29 TYPE 2 DIABETES MELLITUS WITH MICROALBUMINURIA, WITHOUT LONG-TERM CURRENT USE OF INSULIN (H): ICD-10-CM

## 2022-09-15 DIAGNOSIS — Z51.81 ENCOUNTER FOR THERAPEUTIC DRUG MONITORING: ICD-10-CM

## 2022-09-15 DIAGNOSIS — I10 ESSENTIAL HYPERTENSION: ICD-10-CM

## 2022-09-15 DIAGNOSIS — F64.9 GENDER DYSPHORIA: Primary | ICD-10-CM

## 2022-09-15 DIAGNOSIS — Z78.9 NONIMMUNE TO HEPATITIS B VIRUS: ICD-10-CM

## 2022-09-15 DIAGNOSIS — R80.9 TYPE 2 DIABETES MELLITUS WITH MICROALBUMINURIA, WITHOUT LONG-TERM CURRENT USE OF INSULIN (H): ICD-10-CM

## 2022-09-15 PROCEDURE — 99214 OFFICE O/P EST MOD 30 MIN: CPT | Mod: 95 | Performed by: STUDENT IN AN ORGANIZED HEALTH CARE EDUCATION/TRAINING PROGRAM

## 2022-09-15 RX ORDER — CONTAINER,EMPTY
EACH MISCELLANEOUS
Qty: 1 EACH | Refills: 3 | Status: SHIPPED | OUTPATIENT
Start: 2022-09-15 | End: 2022-10-03

## 2022-09-15 RX ORDER — ESTRADIOL VALERATE 20 MG/ML
2 INJECTION INTRAMUSCULAR WEEKLY
Qty: 5 ML | Refills: 3 | Status: SHIPPED | OUTPATIENT
Start: 2022-09-15 | End: 2022-10-03

## 2022-09-15 RX ORDER — SYRINGE, DISPOSABLE, 1 ML
SYRINGE, EMPTY DISPOSABLE MISCELLANEOUS
Qty: 60 EACH | Refills: 3 | Status: SHIPPED | OUTPATIENT
Start: 2022-09-15 | End: 2022-10-03

## 2022-09-15 RX ORDER — NEEDLES, DISPOSABLE 25GX5/8"
NEEDLE, DISPOSABLE MISCELLANEOUS
Qty: 100 EACH | Refills: 3 | Status: SHIPPED | OUTPATIENT
Start: 2022-09-15 | End: 2022-09-28

## 2022-09-15 NOTE — PROGRESS NOTES
Preceptor Attestation:   I discussed the patient with the resident. Patient seen and evaluated via video visit. I have verified the content of the note, which accurately reflects my assessment of the patient and the plan of care.   Supervising Physician:  Stephany Santillan MD.

## 2022-09-15 NOTE — PROGRESS NOTES
"Perla is a 32 year old who is being evaluated via a billable video visit.      How would you like to obtain your AVS? MyChart  If the video visit is dropped, the invitation should be resent by: Text to cell phone: [phone]  Will anyone else be joining your video visit? No    Assessment & Plan     Gender dysphoria  Encounter for therapeutic drug monitoring  We're switching to injectable E!  0.1ml which is 2mg of estradiol valerate per week to start- will likely titrate up. Start one week after last patch.  Call and make an appointment with our nursing staff to do a \"shot visit\" - looks like 10 October. I've reviewed your labs! Will recheck 6 wks after new regimen initiation. Discussed follow up timeline and risks/benefits. Patient still seeing thicker body hair, not sure what effects she should be seeing. This was discussed. She has completed her legal name change and documents are forthcoming.     We should then check your labs 3 days after your shot day week of 14 Nov- I should see you the week of the 21st.       Type 2 diabetes mellitus with microalbuminuria, without long-term current use of insulin (H)  We'll get A1c next time. Continuing to work on meal prepping and exercise, will continue to monitor. Reviewed most recent A1c and kidney function. Continue current regimen.     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     Essential hypertension  Home values within goal range, will continue to monitor at home regarding implications for androgen blockers or other interventions.     Return in about 9 weeks (around 11/17/2022).    Sima Gilbert MD  [facility]    Gerald Duncan is a 32 year old, presenting for the following health issues:  RECHECK (HRT follow up. No specific questions or concerns.)      HPI     Eating patterns are difficulty with stress, is doing better now than was doing over the summer. Giving it her best- looking for a car. " Looking for a car for many months. We are working on getting things stable to be able to come in- today was a good day to virtual.     Hep B  Had all 3 shots- now checking the Abs    HRT:  T rising, a little worried about this. Should we change methods? Interested in injectable E vs adding a T blocker  The two patches are a pain in the ass- sometimes they peel off unless layer with OTC tegaderm    HTN   Checks BP every Monday and Friday  Measures first, gives it a few minutes and does it again  Most recent values: 117/81, 114/78 (same day)    Review of Systems   See HPI      Objective         Vitals:  No vitals were obtained today due to virtual visit.    Physical Exam   GENERAL: Healthy, alert and no distress  EYES: Eyes grossly normal to inspection.  No discharge or erythema, or obvious scleral/conjunctival abnormalities.  RESP: No audible wheeze, cough, or visible cyanosis.  No visible retractions or increased work of breathing.    SKIN: Visible skin clear. No significant rash, abnormal pigmentation or lesions.  NEURO: Cranial nerves grossly intact.  Mentation and speech appropriate for age.  PSYCH: Mentation appears normal, affect normal/bright, judgement and insight intact, normal speech and appearance well-groomed.    Lab on 08/29/2022   Component Date Value Ref Range Status     Sodium 08/29/2022 135 (A) 136 - 145 mmol/L Final     Potassium 08/29/2022 4.9  3.4 - 5.3 mmol/L Final     Creatinine 08/29/2022 0.75  0.51 - 1.17 mg/dL Final    Male and Female  0-2 Months    0.31-0.88 mg/dL  2-12 Months   0.16-0.39 mg/dL  1-2 Years     0.18-0.35 mg/dL  3-4 Years     0.26-0.42 mg/dL  5-6 Years     0.29-0.47 mg/dL  7-8 Years     0.34-0.53 mg/dL  9-10 Years    0.33-0.64 mg/dL  11-12 Years   0.44-0.68 mg/dL  13-14 Years   0.46-0.77 mg/dL    Female  15 Years and older  0.51-0.95 mg/dL    Male  15 Years and older  0.67-1.17 mg/dL         Urea Nitrogen 08/29/2022 10.6  6.0 - 20.0 mg/dL Final     Chloride 08/29/2022 98  98 -  107 mmol/L Final     Carbon Dioxide (CO2) 08/29/2022 26  22 - 29 mmol/L Final     Anion Gap 08/29/2022 11  7 - 15 mmol/L Final     Glucose 08/29/2022 139 (A) 70 - 99 mg/dL Final     Calcium 08/29/2022 9.4  8.6 - 10.0 mg/dL Final     Protein Total 08/29/2022 7.4  6.4 - 8.3 g/dL Final     Albumin 08/29/2022 4.2  3.5 - 5.2 g/dL Final     Bilirubin Total 08/29/2022 0.3  <=1.2 mg/dL Final     Alkaline Phosphatase 08/29/2022 33 (A) 35 - 129 U/L Final    Female:    0-15 days     U/L  15d-1 year   122-469 U/L  1-10 years   142-335 U/L  10-13 years  129-417 U/L  13-15 years   U/L  15-17 years   U/L  17-19 years  45-87 U/L  19 years and older   U/L      Male:  0-15 days     U/L  15d-1 year   122-469 U/L  1-10 years   142-335 U/L  10-13 years  129-417 U/L  13-15 years  116-468 U/L  15-17 years   U/L  17-19 years   U/L  19 years and older   U/L       AST 08/29/2022 22  10 - 50 U/L Final    Female   All ages 10-35 U/L     Male   All ages 10-50 U/L         ALT 08/29/2022 15  10 - 50 U/L Final    Female   All ages 10-35 U/L     Male   All ages 10-50 U/L         GFR Estimate 08/29/2022 >90  >60 mL/min/1.73m2 Final    GFR not calculated when sex unspecified or nonbinary.  Effective December 21, 2021 eGFRcr in adults is calculated using the 2021 CKD-EPI creatinine equation which includes age and gender (Fariba et al., NEJM, DOI: 10.1056/KBINfl4349505)     Cholesterol 08/29/2022 144  <200 mg/dL Final     Triglycerides 08/29/2022 178 (A) <150 mg/dL Final     Direct Measure HDL 08/29/2022 31 (A) >=40 mg/dL Final     LDL Cholesterol Calculated 08/29/2022 77  <=100 mg/dL Final     Non HDL Cholesterol 08/29/2022 113  <130 mg/dL Final     Hemoglobin 08/29/2022 12.6  11.7 - 17.7 g/dL Final    Sex Specific Reference Ranges:    Female  11.7-15.7  g/dL  Male    13.3-17.7   g/dL       Testosterone Total 08/29/2022 69  8 - 950 ng/dL Final     Albumin Urine mg/L 08/29/2022 25.9  mg/L Final    The  reference ranges have not been established in urine albumin. The results should be integrated into the clinical context for interpretation.     Albumin Urine mg/g Cr 08/29/2022 23.55  0.00 - 25.00 mg/g Cr Final    Microalbuminuria is defined as an albumin:creatinine ratio of 17 to 299 for males and 25 to 299 for females. A ratio of albumin:creatinine of 300 or higher is indicative of overt proteinuria.  Due to biologic variability, positive results should be confirmed by a second, first-morning random or 24-hour timed urine specimen. If there is discrepancy, a third specimen is recommended. When 2 out of 3 results are in the microalbuminuria range, this is evidence for incipient nephropathy and warrants increased efforts at glucose control, blood pressure control, and institution of therapy with an angiotensin-converting-enzyme (ACE) inhibitor (if the patient can tolerate it).       Creatinine Urine mg/dL 08/29/2022 110.0  mg/dL Final    The reference ranges have not been established in urine creatinine. The results should be integrated into the clinical context for interpretation.           Video-Visit Details    Video Start Time: 1:29 PM    Type of service:  Video Visit    Video End Time:2:30 PM    Originating Location (pt. Location): Home    Distant Location (provider location):  [facility]     Platform used for Video Visit: [employer]

## 2022-09-15 NOTE — PATIENT INSTRUCTIONS
"It was good to see you today,    Please let me know what your blood pressures have been next time    We're switching to injectable E!   0.1ml which is 2mg of estradiol valerate per week.   Start one week after your last patch.  Call and make an appointment with our nursing staff to do a \"shot visit\" - looks like 10 October    We should then check your labs 3 days after your shot day week of 14 Nov- I should see you the week of the 21st.     You should get the COVID booster at [facility] or wherever you can. Same with the flu shot.     It was good to see you again! Let me know if any questions come up.   Sima Gilbert MD    "

## 2022-09-19 ENCOUNTER — OFFICE VISIT (OUTPATIENT)
Dept: DERMATOLOGY | Facility: CLINIC | Age: 32
End: 2022-09-19
Payer: COMMERCIAL

## 2022-09-19 DIAGNOSIS — F64.9 GENDER DYSPHORIA: ICD-10-CM

## 2022-09-19 PROCEDURE — 17999 UNLISTD PX SKN MUC MEMB SUBQ: CPT | Mod: 58 | Performed by: DERMATOLOGY

## 2022-09-19 NOTE — PATIENT INSTRUCTIONS
Gentle Max Laser Hair Reduction Instructions    Laser hair reduction: I will have redness, pain and swelling. I may have skin discoloration, bruising and itching. Risks are permenant discoloration, hives, infection scarring, eye injury,hair growth, and blister. The outcome could be no improvement or slight improvement. Multiple treatments are required. All hair will not be removed.     Before the procedure:  Sun Protection:  Do not allow the area to become tan or come in with a tan for a treatment. Tans will increased the risks of the procedure. Do not use spray or any over counter product self tanners prior to the procedure.     Shaving:   Gently shave the area the evening before the procedure.     Other:  Avoid any retinols such as Differin, adapalene, retinol or tretinoin to the treated area 1 week prior. Hold bleaching creams such as hydroquinone until 1 week prior.  Avoid any hair removal procedures such as waxing, electrolysis or other lasers on the skin within 6 weeks prior. Do not have any other cosmetic procedures done on the area within the prior 6 weeks such as chemical peels or dermabrasion.      Post Procedure:  Day 1-7  The healing time for any given treatment varies between clients. The following represents the general recovery phases you might expect. Individual clients may experience variations from this course.     Swelling/Discomfort/Redness:  The most common side effects are erythema and edema (redness and swelling) which generally occur immediately after and typically resolve within 48 hours. If crusting and blistering occurs call the clinic.     Activity:  Avoid swimming the day of laser hair removal treatment. Also, no rigorous exercise the day of treatment.     Moisturizers and Sunscreens:  Wait until the skin has returned to normal to start topical products.     Sun Protection:  Do not allow the area to become tan or come in with a tan for a treatment. Tans will increased the risks of the  procedure. Do not use spray or any over counter product self tanners prior to the procedure.     Warning signs:  Call the clinic if you have significant pain, increasing redness, pus, blisters/crusting or for any other concerns.     Who should I call with questions?  [facility]: [phone]  [facility]: [phone]  For urgent needs outside of business hours call the [facility] at [phone] and ask for the dermatology resident on call  [employer] messaging response may be delayed by several days

## 2022-09-19 NOTE — LETTER
9/19/2022         RE: Perla Javed  [address]  [place] [address]        Dear Colleague,    Thank you for referring your patient, Perla Javed, to the [facility]. Please see a copy of my visit note below.    The patient presents for laser hair removal.   See procedure note for details.     Daniel Fischer DO    Department of Dermatology  [facility]: Phone: [phone], Fax:[phone]  [facility]: Phone: [phone], Fax: [phone]        Again, thank you for allowing me to participate in the care of your patient.        Sincerely,        Daniel Fischer MD

## 2022-09-19 NOTE — PROGRESS NOTES
The patient presents for laser hair removal.   See procedure note for details.     Daniel Fischer DO    Department of Dermatology  [facility]: Phone: [phone], Fax:[phone]  [facility]: Phone: [phone], Fax: [phone]

## 2022-09-19 NOTE — NURSING NOTE
Osman Castellano's goals for this visit include:   Chief Complaint   Patient presents with     Laser Treatment     Here for LHR #8 for face/neck           Dermatology Laser Intake Checklist:  History of psoriasis: No  History of recent tan, indoor or outdoor tanning/vacation or other sun exposure: No  History of vitiligo: No  Family history of vitiligo: No  Recent other cosmetic procedure(microderm abrasion/peel/hair removal/facial etc): No  History of HSV: yes   Did the patient start valtrex:yes started on 09/17/2022  For genital laser hair removal patient only: Is there a history of genital warts or condyloma: No  Tattoo in the area to be treated: No  Is patient using hydroquinone: No  Retinoids and other acne medications stopped for 2 weeks: No  Has the patient had accutane in the last 6-12 months: No  Pregnant or breastfeeding: No  History of skin cancer in area planned for treatment: No  History of treatment with gold: No  Changes in medical history: No  Photos obtained:   Does the patient smoke: No  Is the patient on ibuprofen/aspirin/plavix/coumadin/other blood thinner: yes took 600mg of Ibuprofen for back pain yesterday.    If patient is taking narcotic or diazepam(valium)-does patient have : No      Helena Ferguson RN

## 2022-09-20 NOTE — PROCEDURES
Laser Hair Removal Gentle Max Prox (755/1064nm)755  Procedure Date: Sep 19, 2022    Staff Surgeon: Aaliyah    Resident Surgeon: andria    Assistant: Helena Ferguson RN      Trimming required prior to treatment in clinic?: yes  Barba skin type: II  Diagnosis/Treatment Reason: gender dysphoria    Treatment#: 8    Test Area Settings:   Wavelength(755/1064nm):755  Location: left lower chin  Fluence: 30J    Spot size: 15mm  Pulse width:  3 mS ( mS)   Total Number of Pulses: 2  Dynamic cooling spray settin mS  Dynamic cooling device delay:  40 mS    Laser Settings:  Wavelength(755/1064nm): 755  Location: face/neck  Fluence: 30J-significant perifollicular edema with this setting   Spot size: 15mm  Pulse width:  3 mS ( mS)   Total Number of Pulses: 182 pulses  Dynamic cooling spray settin mS  Dynamic cooling device delay:  40 mS        Anesthesia:  The following medication was applied to skin:    MEDICATION: Benzocaine 20%/Lidocaine 6%/Tetracaine 4%  ROUTE: Topical   SITE: face/neck  DOSE: 5 grams  LOT #: [id number]   : Alessandra  EXPIRATION DATE: 10/2022  Was there drug waste? No  Multi-dose vial: Yes      Javier used?:No  Ice used?: Yes    Description of Operation/Procedure:      for return laser hair removal patients.The risks were again reviewed including skin hyperpigmentation, hypopigmentation, scarring, bruising, and blistering. Reviewed white and red hair do not respond. Reviewed hair may regrow as it is not permanent but a reduction in hair growth.  Reviewed that a 3 months waiting period prior to surgery to recommended. Reviewed that the patient can terminate the procedure at anytime.         A  operative consent were obtained. Time-out was performed.  The patient was positioned to optimally expose the area treated. Dynamic cooling was verified and functioning properly.  Protective eyewear was worn by the patient and goggles on all personnel in the treatment room.  The patient confirmed  the site to be treated. The laser energy output was verified by meter reading.  N95 and buffalo filtration was used.     The clinically evident lesion(s) was/were treated with laser beam as above with 1 pass.  The patient tolerated the procedure well and no complications were noted. Post operative instructions were provided. The total laser operation and preparation time was 10 minutes.  The patient was counseled to contact us immediately for any concerns. The patient was offered and recommended to read their after visit summary.     Numeric pain scale after treatment: 4/10    The patient will follow-up in 6-8 weeks and has been scheduled.    Bill to insurance     Dr. Fischer staffed the patient was present for identifying and marking target area(s),, ascertainment of protective equipment utilization,, calibration of laser,, treatment of area(s), and treatment of the 1 portion of the lesion,    Staff Involved:  Dr Aaliyah Ferguson, RN

## 2022-09-23 ENCOUNTER — IMMUNIZATION (OUTPATIENT)
Dept: FAMILY MEDICINE | Facility: CLINIC | Age: 32
End: 2022-09-23
Payer: COMMERCIAL

## 2022-09-23 DIAGNOSIS — Z23 HIGH PRIORITY FOR 2019-NCOV VACCINE: Primary | ICD-10-CM

## 2022-09-23 PROCEDURE — 91312 COVID-19,PF,PFIZER BOOSTER BIVALENT: CPT

## 2022-09-23 PROCEDURE — 0124A COVID-19,PF,PFIZER BOOSTER BIVALENT: CPT

## 2022-10-10 ENCOUNTER — ALLIED HEALTH/NURSE VISIT (OUTPATIENT)
Dept: FAMILY MEDICINE | Facility: CLINIC | Age: 32
End: 2022-10-10
Payer: COMMERCIAL

## 2022-10-10 ENCOUNTER — HEALTH MAINTENANCE LETTER (OUTPATIENT)
Age: 32
End: 2022-10-10

## 2022-10-10 DIAGNOSIS — F64.9 GENDER DYSPHORIA: Primary | ICD-10-CM

## 2022-10-10 PROCEDURE — 99207 PR NO CHARGE NURSE ONLY: CPT

## 2022-10-10 NOTE — PROGRESS NOTES
"Patient presented to clinic with all supplies for Sub-Q injection teaching. Patient was present at visit.     RN reviewed necessary supplies: difference in needles (drawing up vs injecting), how to read a syringe, how to read medication label, disposal of sharps, and proper hand hygiene.     Discussed how to switch out needles and patient demonstrated understanding of reading syringe. RN reviewed safe needle handling (using \"scoop\" method). Patient independently eunice up proper amount of Delestrogen.     RN discussed site for Sub-Q injection and reviewed proper Sub-Q injection technique. Patient practiced using injecting pad.    At end of visit, patient independently completed self-injection of 0.1 mL (2 mg) Delestrogen into left abd under supervision of RN.    Completed visit with discussion of refill policy.     Patient verbalized understanding and was engaged during appointment.    Dr. Collado was the preceptor on site for this visit and available for questions.    Sheila Maguire RN      "

## 2022-10-19 ENCOUNTER — TELEPHONE (OUTPATIENT)
Dept: DERMATOLOGY | Facility: CLINIC | Age: 32
End: 2022-10-19

## 2022-10-19 NOTE — TELEPHONE ENCOUNTER
Called pt to cancel November appointment due to staffing shortage.     Pt confirms November appointment canceled.  Would like to add additional appointment after 1/9/23 appt.     Scheduled pt for 2/20/23.    Pt wanted to verify prior authorization would be ok for these last two appointments.  Routing to financial counselors to verify extension is ok.  Informed pt we will contact her if any issues with PA.    Pt verbalized understanding.    Suzie Puri RN

## 2022-10-21 NOTE — TELEPHONE ENCOUNTER
There is no prior authorization on file as [facility] Medicaid does not require prior authorization for CPT code 05900 for 2022. Patient will need to make sure with Medicaid that her insurance will stay [facility] for the new year.  PA requirements can not be checked for next year until 1/1/2023  The patient will need to call their insurance to check 2023 benefits.

## 2022-10-21 NOTE — TELEPHONE ENCOUNTER
Writer spoke to patient and relayed information below. Patient confirmed understanding verbally and had no further questions or concerns.    Priscilla Aguilar LPN

## 2022-11-10 ENCOUNTER — IMMUNIZATION (OUTPATIENT)
Dept: FAMILY MEDICINE | Facility: CLINIC | Age: 32
End: 2022-11-10
Payer: COMMERCIAL

## 2022-11-10 DIAGNOSIS — Z23 NEED FOR PROPHYLACTIC VACCINATION AND INOCULATION AGAINST INFLUENZA: Primary | ICD-10-CM

## 2022-11-10 PROCEDURE — 90686 IIV4 VACC NO PRSV 0.5 ML IM: CPT

## 2022-11-10 PROCEDURE — 90471 IMMUNIZATION ADMIN: CPT

## 2022-11-17 ENCOUNTER — DOCUMENTATION ONLY (OUTPATIENT)
Dept: LAB | Facility: CLINIC | Age: 32
End: 2022-11-17

## 2022-11-17 ENCOUNTER — LAB (OUTPATIENT)
Dept: LAB | Facility: CLINIC | Age: 32
End: 2022-11-17
Payer: COMMERCIAL

## 2022-11-17 DIAGNOSIS — Z78.9 NONIMMUNE TO HEPATITIS B VIRUS: ICD-10-CM

## 2022-11-17 DIAGNOSIS — R80.9 TYPE 2 DIABETES MELLITUS WITH MICROALBUMINURIA, WITHOUT LONG-TERM CURRENT USE OF INSULIN (H): ICD-10-CM

## 2022-11-17 DIAGNOSIS — E11.29 TYPE 2 DIABETES MELLITUS WITH MICROALBUMINURIA, WITHOUT LONG-TERM CURRENT USE OF INSULIN (H): ICD-10-CM

## 2022-11-17 DIAGNOSIS — F64.9 GENDER DYSPHORIA: ICD-10-CM

## 2022-11-17 DIAGNOSIS — Z51.81 ENCOUNTER FOR THERAPEUTIC DRUG MONITORING: ICD-10-CM

## 2022-11-17 LAB
ALBUMIN SERPL-MCNC: 3.5 G/DL (ref 3.4–5)
ALP SERPL-CCNC: 29 U/L (ref 40–150)
ALT SERPL W P-5'-P-CCNC: 25 U/L (ref 0–70)
ANION GAP SERPL CALCULATED.3IONS-SCNC: 9 MMOL/L (ref 3–14)
AST SERPL W P-5'-P-CCNC: 17 U/L (ref 0–45)
BILIRUB SERPL-MCNC: 0.5 MG/DL (ref 0.2–1.3)
BUN SERPL-MCNC: 9 MG/DL (ref 7–30)
CALCIUM SERPL-MCNC: 8.4 MG/DL (ref 8.5–10.1)
CHLORIDE BLD-SCNC: 104 MMOL/L (ref 94–109)
CHOLEST SERPL-MCNC: 144 MG/DL
CO2 SERPL-SCNC: 25 MMOL/L (ref 20–32)
CREAT SERPL-MCNC: 0.73 MG/DL (ref 0.52–1.25)
ESTRADIOL SERPL-MCNC: 97 PG/ML
FASTING STATUS PATIENT QL REPORTED: ABNORMAL
GFR SERPL CREATININE-BSD FRML MDRD: >90 ML/MIN/1.73M2
GLUCOSE BLD-MCNC: 137 MG/DL (ref 70–99)
HBA1C MFR BLD: 6.5 % (ref 0–5.6)
HCV AB SERPL QL IA: NONREACTIVE
HDLC SERPL-MCNC: 31 MG/DL
HIV 1+2 AB+HIV1 P24 AG SERPL QL IA: NONREACTIVE
LDLC SERPL CALC-MCNC: 73 MG/DL
NONHDLC SERPL-MCNC: 113 MG/DL
POTASSIUM BLD-SCNC: 4.1 MMOL/L (ref 3.4–5.3)
PROT SERPL-MCNC: 7.5 G/DL (ref 6.8–8.8)
SODIUM SERPL-SCNC: 138 MMOL/L (ref 133–144)
TRIGL SERPL-MCNC: 201 MG/DL

## 2022-11-17 PROCEDURE — 82670 ASSAY OF TOTAL ESTRADIOL: CPT

## 2022-11-17 PROCEDURE — 84403 ASSAY OF TOTAL TESTOSTERONE: CPT

## 2022-11-17 PROCEDURE — 86803 HEPATITIS C AB TEST: CPT

## 2022-11-17 PROCEDURE — 80053 COMPREHEN METABOLIC PANEL: CPT

## 2022-11-17 PROCEDURE — 83036 HEMOGLOBIN GLYCOSYLATED A1C: CPT

## 2022-11-17 PROCEDURE — 36415 COLL VENOUS BLD VENIPUNCTURE: CPT

## 2022-11-17 PROCEDURE — 80061 LIPID PANEL: CPT

## 2022-11-17 PROCEDURE — 86706 HEP B SURFACE ANTIBODY: CPT

## 2022-11-17 PROCEDURE — 87389 HIV-1 AG W/HIV-1&-2 AB AG IA: CPT

## 2022-11-17 NOTE — PROGRESS NOTES
Patient was in this am for lab work.  She thought she also needed a urine Albumin.  No orders have been placed.  Pls place orders if applicable. Ann-Marie Hartley MA/lab    Noted that provider doesn't see patient. Ann-Marie Hartley MA/lab

## 2022-11-18 LAB
HBV SURFACE AB SERPL IA-ACNC: 11.23 M[IU]/ML
HBV SURFACE AB SERPL IA-ACNC: NORMAL M[IU]/ML

## 2022-11-21 LAB — TESTOST SERPL-MCNC: 59 NG/DL (ref 8–950)

## 2022-12-07 ENCOUNTER — TELEPHONE (OUTPATIENT)
Dept: FAMILY MEDICINE | Facility: CLINIC | Age: 32
End: 2022-12-07

## 2022-12-07 ENCOUNTER — OFFICE VISIT (OUTPATIENT)
Dept: FAMILY MEDICINE | Facility: CLINIC | Age: 32
End: 2022-12-07
Payer: COMMERCIAL

## 2022-12-07 VITALS
HEART RATE: 104 BPM | BODY MASS INDEX: 44.41 KG/M2 | OXYGEN SATURATION: 96 % | HEIGHT: 68 IN | WEIGHT: 293 LBS | DIASTOLIC BLOOD PRESSURE: 85 MMHG | TEMPERATURE: 99.6 F | SYSTOLIC BLOOD PRESSURE: 137 MMHG

## 2022-12-07 DIAGNOSIS — F64.9 GENDER DYSPHORIA: ICD-10-CM

## 2022-12-07 DIAGNOSIS — E11.29 TYPE 2 DIABETES MELLITUS WITH MICROALBUMINURIA, WITHOUT LONG-TERM CURRENT USE OF INSULIN (H): Primary | ICD-10-CM

## 2022-12-07 DIAGNOSIS — Z51.81 ENCOUNTER FOR THERAPEUTIC DRUG MONITORING: ICD-10-CM

## 2022-12-07 DIAGNOSIS — R80.9 TYPE 2 DIABETES MELLITUS WITH MICROALBUMINURIA, WITHOUT LONG-TERM CURRENT USE OF INSULIN (H): Primary | ICD-10-CM

## 2022-12-07 DIAGNOSIS — I10 ESSENTIAL HYPERTENSION: ICD-10-CM

## 2022-12-07 LAB
CREAT UR-MCNC: 37.4 MG/DL
MICROALBUMIN UR-MCNC: <12 MG/L
MICROALBUMIN/CREAT UR: NORMAL MG/G{CREAT}

## 2022-12-07 PROCEDURE — 99214 OFFICE O/P EST MOD 30 MIN: CPT | Mod: GC | Performed by: STUDENT IN AN ORGANIZED HEALTH CARE EDUCATION/TRAINING PROGRAM

## 2022-12-07 PROCEDURE — 90677 PCV20 VACCINE IM: CPT | Performed by: STUDENT IN AN ORGANIZED HEALTH CARE EDUCATION/TRAINING PROGRAM

## 2022-12-07 PROCEDURE — 90471 IMMUNIZATION ADMIN: CPT | Performed by: STUDENT IN AN ORGANIZED HEALTH CARE EDUCATION/TRAINING PROGRAM

## 2022-12-07 PROCEDURE — 82043 UR ALBUMIN QUANTITATIVE: CPT | Performed by: STUDENT IN AN ORGANIZED HEALTH CARE EDUCATION/TRAINING PROGRAM

## 2022-12-07 PROCEDURE — 99207 PR FOOT EXAM NO CHARGE: CPT | Performed by: STUDENT IN AN ORGANIZED HEALTH CARE EDUCATION/TRAINING PROGRAM

## 2022-12-07 RX ORDER — ESTRADIOL VALERATE 20 MG/ML
3 INJECTION INTRAMUSCULAR WEEKLY
Qty: 5 ML | Refills: 3 | Status: SHIPPED | OUTPATIENT
Start: 2022-12-07 | End: 2023-02-09

## 2022-12-07 NOTE — PROGRESS NOTES
Assessment & Plan     Type 2 diabetes mellitus with microalbuminuria, without long-term current use of insulin (H)  Albumin urine looks well, kidney function stable. A1c improved from previous despite patient's struggle with returning to previous patterns (meal prepping and activity) although she feels more motivated and overall better on current E regimen. Discussed diabetic foot exam and changes as well as eye exams and changes for which we are monitoring. Referral placed for yearly eye visit and diabetic foot exam performed.   - MICRO ALBUMIN, RANDOM URINE  - Adult Eye  Referral; Future  - Albumin Random Urine Quantitative with Creat Ratio; Future  - Albumin Random Urine Quantitative with Creat Ratio    Gender dysphoria  Encounter for therapeutic drug monitoring  Labs reviewed, slightly under goal with T suppression so increase from 2mg weekly to 3mg weekly. Will check E levels in 6 weeks to be sure we have not overshot into dangerous ranges. She has been on E for 21 mo and now is due for breast monitoring that would be due for all other women her age. Discussed self breast exams.   - estradiol valerate (DELESTROGEN) 20 MG/ML injection; Inject 0.15 mLs (3 mg) into the muscle once a week Inject subcutaneous, not into the muscle    Essential hypertension  Reviewed BP measurements from home for last few weeks. Mostly 120s/70s, which is within goal. Sometimes systolic as low as 104. Worried about triggering low BP with increasing dosing. So after discussion with patient will continue to monitor Bps (especially after the holidays) and reassess.     Return in about 6 weeks (around 1/18/2023).    Sima Gilbert MD  [facility] LOLIS Duncan is a 32 year old, presenting for the following health issues:  RECHECK (Pt is here to follow up on lab results.)      HPI     HRT:  Recently switched to injectable E, feels this is going much better. Skin feels softer, mood is  "better, motivation is higher, feels consistently better overall. No problems with injections or getting supplies. Wondering about persistently elevated T (>50) even though E levels suggest physiologic dosing.     DM  Pleasantly surprised about the A1c being down 6.5 from 6.8. Has fallen off meal planning but is working on getting back into it, had some decreased activity and increased stress but wants to work on themselves how they are going to fix it for a few months.     HTN  Home readings 100s-120s systolic, 70s diastolic. No symptomatic highs or lows. Wondering if we should increase meds.     Review of Systems   See HPI      Objective    /85   Pulse 104   Temp 99.6  F (37.6  C) (Oral)   Ht 1.727 m (5' 8\")   Wt (!) 164.3 kg (362 lb 3.2 oz)   SpO2 96%   BMI 55.07 kg/m    Body mass index is 55.07 kg/m .  Physical Exam   Vital signs reviewed  Constitutional: Age appropriate human well kempt, no acute distress  HENT: Sclera non-icteric and not injected, pink conjunctivae  Cardiac: Regular but tachycardic rate  Resp: Speaking in full sentences on room air, no respiratory distress   Skin: Warm, dry  Msk: Ambulates independently, full range of gesture  Neuro: Alert and oriented, movements coordinated and symmetric, appropriate gait  Psych: Affect anxious but soothes over visit, consistent with previous, appropriate to conversation and situation   Diabetic foot exam: normal DP and PT pulses, no trophic changes or ulcerative lesions, normal sensory exam and normal monofilament exam        Lab on 11/17/2022   Component Date Value Ref Range Status     Hepatitis B Surface Antibody Instr* 11/17/2022 11.23  <8.00 m[IU]/mL Final    Indeterminate, Unable to determine if anti-HBs (hepatitis B surface antibody) is present at levels consistent with immunity when the value is between 8.0 and 11.9 mIU/mL.     Hepatitis B Surface Antibody 11/17/2022 Indeterminate   Final    Unable to determine if anti-HBs (hepatitis B " surface antibody) is present at levels consistent with immunity when the value is between 8.00 and 11.99 mIU/mL.     Estradiol 11/17/2022 97  pg/mL Final    Healthy Men:   11.3-43.2 pg/mL    Healthy Postmenopausal Women:  Postmenopause: <5-138 pg/mL    Healthy Pregnant Women:  1st trimester: 154-3243 pg/mL  2nd trimester: 1561-12723 pg/mL  3rd trimester: 8525->03812 pg/mL    Healthy Women Cycle Phase:  Follicular: 30.9-90.4 pg/mL  Ovulation: 60.4-533 pg/mL  Luteal: 60.4-232 pg/mL    Healthy Women Cycle Sub-Phase:  Early Follicular: 20.5-62.8 pg/mL  Intermediate Follicular: 26-79.8 pg/mL  Late Follicular: 49.5-233 pg/mL  Ovulation: 60.4-602 pg/mL  Early Luteal: 51.1-179 pg/mL  Intermediate Luteal: 66.5-305 pg/mL  Late Luteal: 30.2-222 pg/mL     Hemoglobin A1C 11/17/2022 6.5 (H)  0.0 - 5.6 % Final    Normal <5.7%   Prediabetes 5.7-6.4%    Diabetes 6.5% or higher     Note: Adopted from ADA consensus guidelines.     Cholesterol 11/17/2022 144  <200 mg/dL Final     Triglycerides 11/17/2022 201 (H)  <150 mg/dL Final     Direct Measure HDL 11/17/2022 31 (L)  >=40 mg/dL Final     LDL Cholesterol Calculated 11/17/2022 73  <=100 mg/dL Final     Non HDL Cholesterol 11/17/2022 113  <130 mg/dL Final     Patient Fasting > 8hrs? 11/17/2022 Unknown   Final     Sodium 11/17/2022 138  133 - 144 mmol/L Final     Potassium 11/17/2022 4.1  3.4 - 5.3 mmol/L Final     Chloride 11/17/2022 104  94 - 109 mmol/L Final    0-20 years:       Female:  mmol/L  Male:    mmol/L       20 years and older:   Female:  mmol/L   Male:    mmol/L       Carbon Dioxide (CO2) 11/17/2022 25  20 - 32 mmol/L Final     Anion Gap 11/17/2022 9  3 - 14 mmol/L Final     Urea Nitrogen 11/17/2022 9  7 - 30 mg/dL Final    Female  0 to 15 days           3-23  15 days to 1 year      3-17  1 to 10 years          9-22  10 to 19 years         7-19  19 years and older     7-30    Male  0 to 15 days           3-23  15 days to 1 year      3-17  1 to 10  years          9-22  10 to 19 years         7-21  19 years and older     7-30     Creatinine 11/17/2022 0.73  0.52 - 1.25 mg/dL Final    20 y and older Female 0.52-1.04 mg/dL  20 y and older Male 0.66-1.25 mg/dL    Varies with the amount of muscle mass present.    GICH  Female: 0.6-1.2 mg/dL  Male: 0.7-1.3 mg/dL       Calcium 11/17/2022 8.4 (L)  8.5 - 10.1 mg/dL Final     Glucose 11/17/2022 137 (H)  70 - 99 mg/dL Final     Alkaline Phosphatase 11/17/2022 29 (L)  40 - 150 U/L Final    Female:    0-3 years  110-320 U/L  4-9 years  150-420 U/L  10-11 years  130-560 U/L  12-13 years  105-420 U/L  14-15 years   U/L  16 years and older   U/L      Male:  0-3 years  110-320 U/L  4-9 years  150-420 U/L  10-15 years  130-530 U/L  16-19 years   U/L  20 years and older   U/L       AST 11/17/2022 17  0 - 45 U/L Final     ALT 11/17/2022 25  0 - 70 U/L Final    Female    All ages 0-50 U/L    Male    0 - 19 years       0-50 U/L  20 years and older 0-70 U/L            Protein Total 11/17/2022 7.5  6.8 - 8.8 g/dL Final     Albumin 11/17/2022 3.5  3.4 - 5.0 g/dL Final     Bilirubin Total 11/17/2022 0.5  0.2 - 1.3 mg/dL Final     GFR Estimate 11/17/2022 >90  >60 mL/min/1.73m2 Final    GFR not calculated when sex unspecified or nonbinary.  Effective December 21, 2021 eGFRcr in adults is calculated using the 2021 CKD-EPI creatinine equation which includes age and gender (Fariba et al., NEJM, DOI: 10.1056/ORURzi8605524)     Hepatitis C Antibody 11/17/2022 Nonreactive  Nonreactive Final     HIV Antigen Antibody Combo 11/17/2022 Nonreactive  Nonreactive Final    HIV-1 p24 Ag & HIV-1/HIV-2 Ab Not Detected     Testosterone Total 11/17/2022 59  8 - 950 ng/dL Final     Results for orders placed or performed in visit on 12/07/22 (from the past 24 hour(s))   Albumin Random Urine Quantitative with Creat Ratio   Result Value Ref Range    Albumin Urine mg/L <12.0 mg/L    Albumin Urine mg/g Cr      Creatinine Urine mg/dL 37.4  mg/dL

## 2022-12-07 NOTE — TELEPHONE ENCOUNTER
We would like to verify the direction on this prescription, it says inject into the muscle and subcutaneous.

## 2022-12-08 NOTE — PATIENT INSTRUCTIONS
It was solo to see you today,    I am glad we have the opportunity to talk about some your diabetes monitoring.  We did the diabetic foot exam and talked over why we care so much about the diabetic foot exam and why it automatically part of our yearly checks.  We also went over making sure that you have a referral in for a diabetic eye exam which should happen once a year.  Regarding your question about yearly preventative exams or yearly preventative visit, we could time one of your next 2 visits for diabetes and just call one of them a yearly preventative visit, because diabetes is a chronic condition if that is all of what we talked about in addition to those things we review in a yearly preventative exam (like smoking and alcohol, what monitoring and preventative cares are due, that you have a dentist or an eye doctor, etc...  Most things we usually talk about in our clinic visits) we can get that done if you would like.    We will increase her estrogen at this visit, increase to 0.15 mL or 3 mg/week, we will check in at your levels in 6 weeks to make sure that your body adjusted well.  You should be getting mammograms starting at 45 years old.  We do encourage people to do self exams or become kind of familiar with what the breast tissue feels like themselves, however especially if in the future restart progesterone we can anticipate that there will be some changes that you will notice that we will just watch closely.  If you would like, at your next visit we can talk about what are some red flag things that I look for on breast changes or breast findings so that you have that information in the back of your mind if that would be helpful for you.    Regarding your blood pressure, having your systolic or top number be in the 120s usually and down to 104 and your diastolic or the bottom number being mostly in the 70s, I am okay with that.  Our goal is for you to be less than 130/80 at home which it sounds like  you are meeting.  We will keep an eye on it, continue to do your log as that is really quite helpful for us keeping track of where you are.    We will check labs in about 6 weeks, I will see you in about 3 months in person and we will go from there.  Please let me know if you have any other questions or concerns.    It was good to see you, and I hope the new year finds you well  Sima Gilbert MD

## 2022-12-09 NOTE — TELEPHONE ENCOUNTER
"Can you please clarify the direction? The sig says \"INJECT 0.15 MLS (3 MG) INTO THE MUSCLE ONCE A WEEK INJECT SUBCUTANEOUS, NOT INTO THE MUSCLE\". So is it subcutaneous, or intramuscular?    Thank you,  Sabrina Rosas, Pharmacy Technician  [facility]  "

## 2022-12-21 ENCOUNTER — TELEPHONE (OUTPATIENT)
Dept: DERMATOLOGY | Facility: CLINIC | Age: 32
End: 2022-12-21

## 2022-12-21 NOTE — TELEPHONE ENCOUNTER
Extension of services request for 2023.  I spoke with Perla and she confirmed she will have the same insurance for 2023.    Financial Counselor Review:  Procedure to be performed (include CPT code):  Laser hair removal, 88872  Diagnosis:  Gender dyshporia  ICD-10 code:  F64.9  # of treatments requested: 3.  Patient has completed 8 treatments to date.  Ninth treatment is scheduled for 1/9/23.  Sites: face and neck    Ana Palomares RN

## 2022-12-22 NOTE — TELEPHONE ENCOUNTER
PB DOS: 1/2/23-6/30/23  Type of Procedure: LHR  CPT Codes: 45989  ICD10 Codes: F64.9  Surgeon/Ordering provider: Philip Tolentino  Pre-cert/Authorization completed:  no PA required   Payer: Blue Plus   Spoke to [email or web address]  Ref. # [id number]  As long as patient still has Blue Plus active insurance and the patient has contacted their insurance for their specific benefit plan then:      No precertification or post-service clinical review is required for the specific codes you have requested. The patient must stay within network for highest benefit level if it is a plan with out of network benefits. It is important that you verify benefits by going to [email or web address] or calling Customer Service.      This is not a guarantee of payment. Payment is subject to the patient's plan benefits and eligibility at the time services are rendered.       Thank you,    SHARON Chavez  Financial Counselor  [facility]  [address] [place] [address]  [phone]

## 2022-12-23 NOTE — TELEPHONE ENCOUNTER
Writer spoke to patient and relayed information. Patient requested info be mycharted, writer sent message from financial counselor via [employer]. No further questions or concerns.     Priscilla Aguilar LPN

## 2023-01-09 ENCOUNTER — OFFICE VISIT (OUTPATIENT)
Dept: DERMATOLOGY | Facility: CLINIC | Age: 33
End: 2023-01-09
Payer: COMMERCIAL

## 2023-01-09 DIAGNOSIS — F64.9 GENDER DYSPHORIA: Primary | ICD-10-CM

## 2023-01-09 PROCEDURE — 17999 UNLISTD PX SKN MUC MEMB SUBQ: CPT | Mod: 58 | Performed by: DERMATOLOGY

## 2023-01-09 NOTE — LETTER
1/9/2023         RE: Perla Javed  [address]  [place] [address]        Dear Colleague,    Thank you for referring your patient, Perla Javed, to the [facility]. Please see a copy of my visit note below.    The patient presents for laser hair removal.   See procedure note for details.     Staff Physician Comments:   I saw and evaluated the patient with the RN (Suzie Plascencia, CORINNE).  I agree with the assessment and plan and description of the procedure as above.   I personally approved the laser settings and treatment area.   I was present for the test spots. I was immediately available at all times.       Daniel Fischer DO    Department of Dermatology  [facility]: Phone: [phone], Fax:[phone]  [facility]: Phone: [phone], Fax: [phone]        Again, thank you for allowing me to participate in the care of your patient.        Sincerely,        Daniel Fischer MD

## 2023-01-09 NOTE — PROCEDURES
Dermatology Laser Intake Checklist:  History of psoriasis: No, pt states unsure-not on problem list  History of recent tan, indoor or outdoor tanning/vacation or other sun exposure: No  History of vitiligo: No  Family history of vitiligo: No  Recent other cosmetic procedure(microderm abrasion/peel/hair removal/facial etc): No  History of HSV: yes   Did the patient start valtrex: yes - pt states she missed two doses, doubled dose today 1/9/23  For genital laser hair removal patient only: Is there a history of genital warts or condyloma: NA  Tattoo in the area to be treated: No  Is patient using hydroquinone: No  Retinoids and other acne medications stopped for 2 weeks: Occasional use of benzonoate  Has the patient had accutane in the last 6-12 months: No  Pregnant or breastfeeding: No  History of skin cancer in area planned for treatment: No  History of treatment with gold: No  Changes in medical history: No  Photos obtained: no  Does the patient smoke: No  Is the patient on ibuprofen/aspirin/plavix/coumadin/other blood thinner: as needed. None recently  If patient is taking narcotic or diazepam(valium)-does patient have : No    Suzie Puri RN

## 2023-01-09 NOTE — PROGRESS NOTES
The patient presents for laser hair removal.   See procedure note for details.     Staff Physician Comments:   I saw and evaluated the patient with the RN (Suzie Plascencia RN).  I agree with the assessment and plan and description of the procedure as above.   I personally approved the laser settings and treatment area.   I was present for the test spots. I was immediately available at all times.       Daniel Fischer DO    Department of Dermatology  [facility]: Phone: [phone], Fax:[phone]  [facility]: Phone: [phone], Fax: [phone]

## 2023-01-09 NOTE — NURSING NOTE
Laser Hair Removal Gentle Max Prox (755nm)  Procedure Date: 2023    Staff Surgeon: Dr. Fischer    Assistant:  Suzie FRANKLIN    Trimming required prior to treatment in clinic?: yes  Barba skin type: II  Diagnosis/Treatment Reason: gender dysphoria    Treatment#: 9     Test Area Settings:   Wavelength(755/1064nm): 755  Location: left lower chin  Fluence: 30J   Spot size: 15mm  Pulse width:  3 mS ( mS)   Total Number of Pulses: 1  Dynamic cooling spray settin mS  Dynamic cooling device delay:  40 mS    Laser Settings:  Wavelength(755/1064nm): 755  Location: face/neck  Fluence: 30J   Spot size: 15mm  Pulse width:  3 mS ( mS)   Total Number of Pulses: 94  Dynamic cooling spray settin mS  Dynamic cooling device delay:  40 mS        Anesthesia:  The following medication was applied to skin:    MEDICATION: Benzocaine 20%/Lidocaine 6%/Tetracaine 4%  ROUTE: Topical   SITE: face/neck  DOSE: 4 grams  LOT #: [id number]  : Alessandra  EXPIRATION DATE: 23  Was there drug waste? No  Multi-dose vial: Yes      Javier used?:No  Ice used?: Yes    Description of Operation/Procedure:     The risks were again reviewed including skin hyperpigmentation, hypopigmentation, scarring, bruising, and blistering. Reviewed white and red hair do not respond. Reviewed hair may regrow as it is not permanent but a reduction in hair growth.  Reviewed that a 3 months waiting period prior to surgery to recommended. Reviewed that the patient can terminate the procedure at anytime.     An operative consent was obtained. Time-out was performed.  The patient was positioned to optimally expose the area treated. Dynamic cooling was verified and functioning properly.  Protective eyewear was worn by the patient and goggles on all personnel in the treatment room.  The patient confirmed the site to be treated. The laser energy output was verified by meter reading.  N95 and buffalo filtration was used.     The clinically  evident lesion(s) was/were treated with laser beam as above with 1 pass.  The patient tolerated the procedure well and no complications were noted. Post operative instructions were provided. The total laser operation and preparation time was 20 minutes.  The patient was counseled to contact us immediately for any concerns. The patient was offered and recommended to read their after visit summary.     Numeric pain scale after treatment: 5/10    The patient will follow-up in 6 weeks, pt scheduled for next two appts.    Dr. Fischer staffed the patient was present for ascertainment of protective equipment utilization,, calibration of laser, and treatment of area(s),    Staff Involved:  Suzie CODY RN and Stephanie FRANKLIN RN

## 2023-01-16 ENCOUNTER — TELEPHONE (OUTPATIENT)
Dept: DERMATOLOGY | Facility: CLINIC | Age: 33
End: 2023-01-16
Payer: COMMERCIAL

## 2023-01-16 NOTE — TELEPHONE ENCOUNTER
[facility] Call Center    Phone Message    May a detailed message be left on voicemail: yes     Reason for Call: Pt has already talked with billing and they told pt to reach out to Dr. Fischer. Pt is being billed for 01/09/23 - $2,777.00.  Pt thinks this is incorrect, normally pt is billed for $277. Please call pt back to discuss. Thanks     Action Taken: Message routed to:  [facility] ([facility] Derm    Travel Screening: Not Applicable

## 2023-01-16 NOTE — TELEPHONE ENCOUNTER
Per chart review patient was treated for LHR. RN reviewed documentation and noted that correct orders were placed. No PA was required. Patient was notified of this information and informed to reach out to insurance to confirm coverage. Patient is now calling with questions regarding bill. Will forward to patient care supervisor.     Katelyn Taveras RN on 1/16/2023 at 2:57 PM

## 2023-01-17 NOTE — TELEPHONE ENCOUNTER
I contacted the patient and inquired on if insurance had previously covered the entire bill to which she said yes. I explained that there was a price increase with billing insurance but that if her insurance was to not cover the cost at this new rate that she should contact our clinic back for me to work with billing on the cost. The patient was satisfied with this and just wanted assurance that she was not going to be stuck with a $3000 bill when prior would have been $277.    Stephanie Villeda LPN

## 2023-01-19 DIAGNOSIS — R80.9 TYPE 2 DIABETES MELLITUS WITH MICROALBUMINURIA, WITHOUT LONG-TERM CURRENT USE OF INSULIN (H): ICD-10-CM

## 2023-01-19 DIAGNOSIS — I10 ESSENTIAL HYPERTENSION: ICD-10-CM

## 2023-01-19 DIAGNOSIS — E11.29 TYPE 2 DIABETES MELLITUS WITH MICROALBUMINURIA, WITHOUT LONG-TERM CURRENT USE OF INSULIN (H): ICD-10-CM

## 2023-01-19 DIAGNOSIS — E78.1 HYPERTRIGLYCERIDEMIA: ICD-10-CM

## 2023-01-19 NOTE — TELEPHONE ENCOUNTER
"Request for medication refill: lisinopril (ZESTRIL) 10 MG tablet, metFORMIN (GLUCOPHAGE-XR) 500 MG 24 hr tablet    Providers if patient needs an appointment and you are willing to give a one month supply please refill for one month and  send a letter/MyChart using \".SMILLIMITEDREFILL\" .smillimited and route chart to \"P [facility] \" (Giving one month refill in non controlled medications is strongly recommended before denial)    If refill has been denied, meaning absolutely no refills without visit, please complete the smart phrase \".smirxrefuse\" and route it to the \"P SMI MED REFILLS\"  pool to inform the patient and the pharmacy.    Delores Warner, CMA      "

## 2023-01-23 RX ORDER — METFORMIN HCL 500 MG
1000 TABLET, EXTENDED RELEASE 24 HR ORAL 2 TIMES DAILY
Qty: 360 TABLET | Refills: 3 | Status: SHIPPED | OUTPATIENT
Start: 2023-01-23 | End: 2023-02-09

## 2023-01-23 RX ORDER — LISINOPRIL 10 MG/1
10 TABLET ORAL DAILY
Qty: 90 TABLET | Refills: 3 | Status: SHIPPED | OUTPATIENT
Start: 2023-01-23 | End: 2023-02-09

## 2023-01-23 RX ORDER — FENOFIBRATE 200 MG/1
200 CAPSULE ORAL
Qty: 90 CAPSULE | Refills: 3 | Status: SHIPPED | OUTPATIENT
Start: 2023-01-23 | End: 2023-02-09

## 2023-01-26 ENCOUNTER — LAB (OUTPATIENT)
Dept: LAB | Facility: CLINIC | Age: 33
End: 2023-01-26
Payer: COMMERCIAL

## 2023-01-26 DIAGNOSIS — E11.29 TYPE 2 DIABETES MELLITUS WITH MICROALBUMINURIA, WITHOUT LONG-TERM CURRENT USE OF INSULIN (H): ICD-10-CM

## 2023-01-26 DIAGNOSIS — F64.9 GENDER DYSPHORIA: ICD-10-CM

## 2023-01-26 DIAGNOSIS — R80.9 TYPE 2 DIABETES MELLITUS WITH MICROALBUMINURIA, WITHOUT LONG-TERM CURRENT USE OF INSULIN (H): ICD-10-CM

## 2023-01-26 DIAGNOSIS — Z51.81 ENCOUNTER FOR THERAPEUTIC DRUG MONITORING: ICD-10-CM

## 2023-01-26 LAB
ESTRADIOL SERPL-MCNC: 155 PG/ML
HBA1C MFR BLD: 7.5 % (ref 0–5.6)

## 2023-01-26 PROCEDURE — 36415 COLL VENOUS BLD VENIPUNCTURE: CPT

## 2023-01-26 PROCEDURE — 83036 HEMOGLOBIN GLYCOSYLATED A1C: CPT

## 2023-01-26 PROCEDURE — 84403 ASSAY OF TOTAL TESTOSTERONE: CPT

## 2023-01-26 PROCEDURE — 82670 ASSAY OF TOTAL ESTRADIOL: CPT

## 2023-01-30 LAB — TESTOST SERPL-MCNC: 20 NG/DL (ref 8–950)

## 2023-02-09 ENCOUNTER — OFFICE VISIT (OUTPATIENT)
Dept: FAMILY MEDICINE | Facility: CLINIC | Age: 33
End: 2023-02-09
Payer: COMMERCIAL

## 2023-02-09 VITALS
RESPIRATION RATE: 18 BRPM | OXYGEN SATURATION: 96 % | HEIGHT: 68 IN | WEIGHT: 293 LBS | BODY MASS INDEX: 44.41 KG/M2 | DIASTOLIC BLOOD PRESSURE: 82 MMHG | SYSTOLIC BLOOD PRESSURE: 130 MMHG | HEART RATE: 112 BPM | TEMPERATURE: 98.7 F

## 2023-02-09 DIAGNOSIS — F64.9 GENDER DYSPHORIA: ICD-10-CM

## 2023-02-09 DIAGNOSIS — R80.9 TYPE 2 DIABETES MELLITUS WITH MICROALBUMINURIA, WITHOUT LONG-TERM CURRENT USE OF INSULIN (H): Primary | ICD-10-CM

## 2023-02-09 DIAGNOSIS — G47.9 SLEEP DIFFICULTIES: ICD-10-CM

## 2023-02-09 DIAGNOSIS — E11.29 TYPE 2 DIABETES MELLITUS WITH MICROALBUMINURIA, WITHOUT LONG-TERM CURRENT USE OF INSULIN (H): Primary | ICD-10-CM

## 2023-02-09 DIAGNOSIS — I10 ESSENTIAL HYPERTENSION: ICD-10-CM

## 2023-02-09 DIAGNOSIS — B00.9 HSV (HERPES SIMPLEX VIRUS) INFECTION: ICD-10-CM

## 2023-02-09 DIAGNOSIS — E78.1 HYPERTRIGLYCERIDEMIA: ICD-10-CM

## 2023-02-09 PROCEDURE — 99214 OFFICE O/P EST MOD 30 MIN: CPT | Mod: GC | Performed by: STUDENT IN AN ORGANIZED HEALTH CARE EDUCATION/TRAINING PROGRAM

## 2023-02-09 RX ORDER — METFORMIN HCL 500 MG
1000 TABLET, EXTENDED RELEASE 24 HR ORAL 2 TIMES DAILY
Qty: 360 TABLET | Refills: 3 | Status: SHIPPED | OUTPATIENT
Start: 2023-02-09 | End: 2024-03-11

## 2023-02-09 RX ORDER — ESTRADIOL VALERATE 20 MG/ML
3 INJECTION INTRAMUSCULAR WEEKLY
Qty: 5 ML | Refills: 3 | Status: SHIPPED | OUTPATIENT
Start: 2023-02-09 | End: 2023-06-05

## 2023-02-09 RX ORDER — LISINOPRIL 10 MG/1
10 TABLET ORAL DAILY
Qty: 90 TABLET | Refills: 3 | Status: SHIPPED | OUTPATIENT
Start: 2023-02-09 | End: 2024-03-11

## 2023-02-09 RX ORDER — FENOFIBRATE 200 MG/1
200 CAPSULE ORAL
Qty: 90 CAPSULE | Refills: 3 | Status: SHIPPED | OUTPATIENT
Start: 2023-02-09 | End: 2024-03-11

## 2023-02-09 NOTE — PROGRESS NOTES
Assessment & Plan     Gender dysphoria  Estrogen and testosterone both within goal range at current dosage, discussed starting progesterone however patient would like to more firmly established that this is a stable and steady dose with stable and steady estrogen levels prior to changing anything else.  Has noticed discontinuation of breast tenderness, has had continued darkened hair which is moderately dysphoria inducing.  Will consider further what steps she wishes to take at future visit.  - estradiol valerate (DELESTROGEN) 20 MG/ML injection; Inject 0.15 mLs (3 mg) into the muscle once a week Inject subcutaneous, not into the muscle    Type 2 diabetes mellitus with microalbuminuria, without long-term current use of insulin (H)  A1c increased to 7 from 6, patient states that they were anticipating it would actually be higher.  Suggests that within the last 30 days they may have had some good effect with the behavioral changes resulting in improvement in daily glucoses, glucoses most recently from home monitor have been within goal range.  We will wait until 3 months from now to recheck A1c and see if any further adjustments were recommended.  - lisinopril (ZESTRIL) 10 MG tablet; Take 1 tablet (10 mg) by mouth daily  - metFORMIN (GLUCOPHAGE XR) 500 MG 24 hr tablet; Take 2 tablets (1,000 mg) by mouth 2 times daily    Patient requesting medications refilled and sent to new pharmacy system- this was done    Hypertriglyceridemia  - fenofibrate micronized (LOFIBRA) 200 MG capsule; Take 1 capsule (200 mg) by mouth every morning (before breakfast)  Essential hypertension  Blood pressures at home within goal range, will not adjust medications at this time.  - lisinopril (ZESTRIL) 10 MG tablet; Take 1 tablet (10 mg) by mouth daily  Sleep difficulties  - melatonin 5 MG tablet; Take 1 tablet (5 mg) by mouth nightly as needed for sleep    Patient is a member of the LGBT community, this is a social determinants of health  "that impacts how they interact with the medical system.      No follow-ups on file.    Sima Gilbert MD  [facility]    Check testosterone and estrogen at June visit with A1c    Subjective   Perla is a 32 year old, presenting for the following health issues:  Lab Result Notice (Follow up)      HPI     Felt very out of control and physically unwell (which happens when she eats poorly) over last two months. Doesn't' like cooking but has been deliberate about doing more of it. Has been regularly measuring BP and Glu at home, both have come down over last few weeks and she feels more in control.     BP between 114-120/70-81  Blood sugar: yesterday 123, today 130    No difficulties with HRT, feels more steady/stable. Notes still growing thick hair in some places, breasts no longer tender or growing. Would like to feel stable and steady on E before adding progesterone. Has has [facility] trouble with [facility], would like to transfer to [facility] office site for pharm.     Review of Systems   See HPI      Objective    /82   Pulse 112   Temp 98.7  F (37.1  C)   Resp 18   Ht 1.727 m (5' 8\")   Wt (!) 161.7 kg (356 lb 6.4 oz)   SpO2 96%   BMI 54.19 kg/m    Body mass index is 54.19 kg/m .  Physical Exam   Vital signs reviewed  Constitutional: Age appropriate human well kempt, no acute distress  HENT: Sclera non-icteric and not injected, pink conjunctivae  Cardiac: Tachycardic (baseline for presenting to clinic)  Resp: Speaking in full sentences on room air, no respiratory distress  Skin: Warm, dry   Msk: Ambulates independently, full range of gesture  Neuro: Alert and oriented, movements coordinated and symmetric, appropriate gait  Psych: Affect slightly anxious but calms through interview and is appropriate to conversation and situation       Lab on 01/26/2023   Component Date Value Ref Range Status     Hemoglobin A1C 01/26/2023 7.5 (H)  0.0 - 5.6 % Final    Normal <5.7% "   Prediabetes 5.7-6.4%    Diabetes 6.5% or higher     Note: Adopted from ADA consensus guidelines.     Estradiol 01/26/2023 155  pg/mL Final    Healthy Men:   11.3-43.2 pg/mL    Healthy Postmenopausal Women:  Postmenopause: <5-138 pg/mL    Healthy Pregnant Women:  1st trimester: 154-3243 pg/mL  2nd trimester: 1561-23020 pg/mL  3rd trimester: 8525->47529 pg/mL    Healthy Women Cycle Phase:  Follicular: 30.9-90.4 pg/mL  Ovulation: 60.4-533 pg/mL  Luteal: 60.4-232 pg/mL    Healthy Women Cycle Sub-Phase:  Early Follicular: 20.5-62.8 pg/mL  Intermediate Follicular: 26-79.8 pg/mL  Late Follicular: 49.5-233 pg/mL  Ovulation: 60.4-602 pg/mL  Early Luteal: 51.1-179 pg/mL  Intermediate Luteal: 66.5-305 pg/mL  Late Luteal: 30.2-222 pg/mL     Testosterone Total 01/26/2023 20  8 - 950 ng/dL Final

## 2023-02-18 ENCOUNTER — HEALTH MAINTENANCE LETTER (OUTPATIENT)
Age: 33
End: 2023-02-18

## 2023-02-20 ENCOUNTER — OFFICE VISIT (OUTPATIENT)
Dept: DERMATOLOGY | Facility: CLINIC | Age: 33
End: 2023-02-20
Payer: COMMERCIAL

## 2023-02-20 DIAGNOSIS — F64.9 GENDER DYSPHORIA: Primary | ICD-10-CM

## 2023-02-20 PROCEDURE — 17999 UNLISTD PX SKN MUC MEMB SUBQ: CPT | Mod: 58 | Performed by: DERMATOLOGY

## 2023-02-20 ASSESSMENT — PAIN SCALES - GENERAL: PAINLEVEL: NO PAIN (0)

## 2023-02-20 NOTE — PATIENT INSTRUCTIONS
Electrolysis locations ([facility] is not affiliated with any of these locations.  These are recommendations only).  These locations have been specifically identified as being safe and positive experiences for trans folks. If you have other recommendations we are happy to add additional locations, please let us know. If you have a negative experience, please tell us know so we can remove the provider.       [facility] (July, owner, Trans owned)   Operating out of [employer]   [address]   [place] [address]   [email or web address]   [phone]     [facility]   [address] [place] [address]   [email or web address]   [phone]     [facility]   [address]   [place] [address]   [email or web address]   [phone]     [facility] [place] location   [address]   [place], [place] [address]   [email or web address]   [email or web address]     [facility]   [address]   [place] [address]   [phone]     [facility]   [address],   [place] [address]   [email or web address]   [phone]      Gentle Max Laser Hair Reduction Instructions    Laser hair reduction: I will have redness, pain and swelling. I may have skin discoloration, bruising and itching. Risks are permenant discoloration, hives, infection scarring, eye injury,hair growth, and blister. The outcome could be no improvement or slight improvement. Multiple treatments are required. All hair will not be removed.     Before the procedure:  Sun Protection:  Do not allow the area to become tan or come in with a tan for a treatment. Tans will increased the risks of the procedure. Do not use spray or any over counter product self tanners prior to the procedure.     Shaving:   Gently shave the area the evening before the procedure.     Other:  Avoid any retinols  such as Differin, adapalene, retinol or tretinoin to the treated area 1 week prior. Hold bleaching creams such as hydroquinone until 1 week prior.  Avoid any hair removal procedures such as waxing, electrolysis or other lasers on the skin within 6 weeks prior. Do not have any other cosmetic procedures done on the area within the prior 6 weeks such as chemical peels or dermabrasion.      Post Procedure:  Day 1-7  The healing time for any given treatment varies between clients. The following represents the general recovery phases you might expect. Individual clients may experience variations from this course.     Swelling/Discomfort/Redness:  The most common side effects are erythema and edema (redness and swelling) which generally occur immediately after and typically resolve within 48 hours. If crusting and blistering occurs call the clinic.     Activity:  Avoid swimming the day of laser hair removal treatment. Also, no rigorous exercise the day of treatment.     Moisturizers and Sunscreens:  Wait until the skin has returned to normal to start topical products.     Sun Protection:  Do not allow the area to become tan or come in with a tan for a treatment. Tans will increased the risks of the procedure. Do not use spray or any over counter product self tanners prior to the procedure.     Warning signs:  Call the clinic if you have significant pain, increasing redness, pus, blisters/crusting or for any other concerns.     Who should I call with questions?  [facility]: [phone]  [facility]: [phone]  For urgent needs outside of business hours call the [facility] at [phone] and ask for the dermatology resident on call  [employer] messaging response may be delayed by several days

## 2023-02-20 NOTE — PROCEDURES
Laser Hair Removal Gentle Max Prox (755nm)  Procedure Date: 2023    Staff Surgeon: Dr Tolentino    Assistant: CORINNE Stephenson    Trimming required prior to treatment in clinic?: Yes  Barba skin type: II  Diagnosis/Treatment Reason: gender dysphoria    Treatment#: 10     Test Area Settings:   Wavelength(755/1064nm): 755  Location: Chin  Fluence: 30J   Spot size: 15mm  Pulse width:  3 mS ( mS)   Total Number of Pulses: 1    Dynamic cooling spray settin mS  Dynamic cooling device delay:  40 mS    Laser Settings:  Wavelength(755/1064nm): 755  Location: face/neck  Fluence: 30J   Spot size: 15mm  Pulse width:  5 mS ( mS)   Total Number of Pulses: 115  Dynamic cooling spray settin mS  Dynamic cooling device delay:  40 mS    Anesthesia:  Topical LMX    Javier used?:No  Ice used?: Yes    Description of Operation/Procedure:   The risks were again reviewed including skin hyperpigmentation, hypopigmentation, scarring, bruising, and blistering. Reviewed white and red hair do not respond. Reviewed hair may regrow as it is not permanent but a reduction in hair growth.  Reviewed that a 3 months waiting period prior to surgery to recommended. Reviewed that the patient can terminate the procedure at anytime.     An operative consent was obtained. Time-out was performed.  The patient was positioned to optimally expose the area treated. Dynamic cooling was verified and functioning properly.  Protective eyewear was worn by the patient and goggles on all personnel in the treatment room.  The patient confirmed the site to be treated. The laser energy output was verified by meter reading.  N95 and buffalo filtration was used.     The patient tolerated the procedure well and no complications were noted. Post operative instructions were provided. The total laser operation and preparation time was 60 minutes.  The patient was counseled to contact us immediately for any concerns. The patient was offered and recommended to read  their after visit summary.     Numeric pain scale after treatment: 1/10    This is pt's last treatment. MD graduated pt and recommends electrolysis for any remaining hair.    This appointment will be billed to pt's insurance    Dr. Tolentino staffed the patient was present for identifying and marking target area(s),, ascertainment of protective equipment utilization,, calibration of laser, and treatment of area(s)    Patient has been graduated from laser hair reduction.  Last treatment: 2/20/23  Sites/# of treatments completed: Face/neck/ 10 total treatements    Patient has been removed from active L[facility] list as of 2/20/23.  If patient calls back to schedule please notify RN (send message to [phone]).    CORINNE Stephenson    Staff Involved:  Staff Only    Staff Physician Comments:   I saw and evaluated the patient with the RN and I agree with the assessment and plan.  I was present for the key portions of the above major procedure and examination, including identifying and marking target area(s), ascertainment of protective equipment utilization, calibration of laser, and for part of the procedure.     Philip Tolentino MD  Pronouns: he/him/his    Department of Dermatology  [facility]: Phone: [phone], Fax:[phone]  [facility]: Phone: [phone] Fax: [phone]

## 2023-02-20 NOTE — PROGRESS NOTES
See procedure note.     Philip Tolentino MD  Pronouns: he/him/his    Department of Dermatology  [facility]: Phone: [phone], Fax:[phone]  [facility]: Phone: [phone] Fax: [phone]

## 2023-02-20 NOTE — LETTER
2/20/2023         RE: Perla Javed  [address]  [place] [address]        Dear Colleague,    Thank you for referring your patient, Perla Javed, to the [facility]. Please see a copy of my visit note below.    See procedure note.     Philip Tolentino MD  Pronouns: he/him/his    Department of Dermatology  [facility]: Phone: [phone], Fax:[phone]  [facility]: Phone: [phone] Fax: [phone]        Again, thank you for allowing me to participate in the care of your patient.        Sincerely,        Philip Tolentino MD

## 2023-02-20 NOTE — NURSING NOTE
Osman Castellano's goals for this visit include:   Chief Complaint   Patient presents with     Laser Treatment     LHR- gender dysphoria- face/neck           Dermatology Laser Intake Checklist:  History of psoriasis: No  History of recent tan, indoor or outdoor tanning/vacation or other sun exposure: No  History of vitiligo: No  Family history of vitiligo: No  Recent other cosmetic procedure(microderm abrasion/peel/hair removal/facial etc): No  History of HSV: yes   Did the patient start valtrex:yes started on 09/17/2022  For genital laser hair removal patient only: Is there a history of genital warts or condyloma: No  Tattoo in the area to be treated: No  Is patient using hydroquinone: No  Retinoids and other acne medications stopped for 2 weeks: No  Has the patient had accutane in the last 6-12 months: No  Pregnant or breastfeeding: No  History of skin cancer in area planned for treatment: No  History of treatment with gold: No  Changes in medical history: No  Photos obtained: No  Does the patient smoke: No  Is the patient on ibuprofen/aspirin/plavix/coumadin/other blood thinner: yes took 600mg of Ibuprofen for back pain yesterday.    If patient is taking narcotic or diazepam(valium)-does patient have : No      Reny Pearl RN on 2/20/2023 at 10:19 AM    The following medication was applied to skin:    MEDICATION: 4% licodaine  ROUTE: Topical   SITE:Face/neck  DOSE: 10g  LOT #: [id number]  : [employer]  EXPIRATION DATE: 06/2024  Was there drug waste? No  Multi-dose tube: No

## 2023-03-05 ENCOUNTER — MYC MEDICAL ADVICE (OUTPATIENT)
Dept: FAMILY MEDICINE | Facility: CLINIC | Age: 33
End: 2023-03-05
Payer: COMMERCIAL

## 2023-03-05 DIAGNOSIS — E11.29 TYPE 2 DIABETES MELLITUS WITH MICROALBUMINURIA, WITHOUT LONG-TERM CURRENT USE OF INSULIN (H): ICD-10-CM

## 2023-03-05 DIAGNOSIS — R80.9 TYPE 2 DIABETES MELLITUS WITH MICROALBUMINURIA, WITHOUT LONG-TERM CURRENT USE OF INSULIN (H): ICD-10-CM

## 2023-03-06 NOTE — TELEPHONE ENCOUNTER
"Last seen 2/9/2023    Request for medication refill:    Providers if patient needs an appointment and you are willing to give a one month supply please refill for one month and  send a letter/MyChart using \".SMILLIMITEDREFILL\" .smillimited and route chart to \"P SMI \" (Giving one month refill in non controlled medications is strongly recommended before denial)    If refill has been denied, meaning absolutely no refills without visit, please complete the smart phrase \".smirxrefuse\" and route it to the \"P SMI MED REFILLS\"  pool to inform the patient and the pharmacy.    Carolyn Robert RN        "

## 2023-04-27 ENCOUNTER — LAB (OUTPATIENT)
Dept: LAB | Facility: CLINIC | Age: 33
End: 2023-04-27
Payer: COMMERCIAL

## 2023-04-27 DIAGNOSIS — E11.29 TYPE 2 DIABETES MELLITUS WITH MICROALBUMINURIA, WITHOUT LONG-TERM CURRENT USE OF INSULIN (H): ICD-10-CM

## 2023-04-27 DIAGNOSIS — E78.1 HYPERTRIGLYCERIDEMIA: ICD-10-CM

## 2023-04-27 DIAGNOSIS — I10 ESSENTIAL HYPERTENSION: ICD-10-CM

## 2023-04-27 DIAGNOSIS — Z51.81 ENCOUNTER FOR THERAPEUTIC DRUG MONITORING: ICD-10-CM

## 2023-04-27 DIAGNOSIS — F64.9 GENDER DYSPHORIA: ICD-10-CM

## 2023-04-27 DIAGNOSIS — R80.9 TYPE 2 DIABETES MELLITUS WITH MICROALBUMINURIA, WITHOUT LONG-TERM CURRENT USE OF INSULIN (H): ICD-10-CM

## 2023-04-27 LAB
ALBUMIN SERPL-MCNC: 3.6 G/DL (ref 3.4–5)
ALP SERPL-CCNC: 31 U/L (ref 40–150)
ALT SERPL W P-5'-P-CCNC: 19 U/L (ref 0–70)
ANION GAP SERPL CALCULATED.3IONS-SCNC: 5 MMOL/L (ref 3–14)
AST SERPL W P-5'-P-CCNC: 14 U/L (ref 0–45)
BILIRUB SERPL-MCNC: 0.5 MG/DL (ref 0.2–1.3)
BUN SERPL-MCNC: 13 MG/DL (ref 7–30)
CALCIUM SERPL-MCNC: 9 MG/DL (ref 8.5–10.1)
CHLORIDE BLD-SCNC: 103 MMOL/L (ref 94–109)
CO2 SERPL-SCNC: 28 MMOL/L (ref 20–32)
CREAT SERPL-MCNC: 0.85 MG/DL (ref 0.52–1.25)
GFR SERPL CREATININE-BSD FRML MDRD: >90 ML/MIN/1.73M2
GLUCOSE BLD-MCNC: 138 MG/DL (ref 70–99)
HBA1C MFR BLD: 5.8 % (ref 0–5.6)
POTASSIUM BLD-SCNC: 4.2 MMOL/L (ref 3.4–5.3)
PROT SERPL-MCNC: 7.6 G/DL (ref 6.8–8.8)
SODIUM SERPL-SCNC: 136 MMOL/L (ref 133–144)

## 2023-04-27 PROCEDURE — 82043 UR ALBUMIN QUANTITATIVE: CPT

## 2023-04-27 PROCEDURE — 80053 COMPREHEN METABOLIC PANEL: CPT

## 2023-04-27 PROCEDURE — 36415 COLL VENOUS BLD VENIPUNCTURE: CPT

## 2023-04-27 PROCEDURE — 83036 HEMOGLOBIN GLYCOSYLATED A1C: CPT

## 2023-04-27 PROCEDURE — 82570 ASSAY OF URINE CREATININE: CPT

## 2023-04-27 PROCEDURE — 82670 ASSAY OF TOTAL ESTRADIOL: CPT

## 2023-04-28 LAB
CREAT UR-MCNC: 299 MG/DL
ESTRADIOL SERPL-MCNC: 99 PG/ML
MICROALBUMIN UR-MCNC: 65 MG/L
MICROALBUMIN/CREAT UR: 21.74 MG/G CR (ref 0–25)

## 2023-05-11 ENCOUNTER — OFFICE VISIT (OUTPATIENT)
Dept: FAMILY MEDICINE | Facility: CLINIC | Age: 33
End: 2023-05-11
Payer: COMMERCIAL

## 2023-05-11 VITALS
SYSTOLIC BLOOD PRESSURE: 116 MMHG | TEMPERATURE: 98.8 F | HEART RATE: 94 BPM | RESPIRATION RATE: 16 BRPM | DIASTOLIC BLOOD PRESSURE: 71 MMHG | OXYGEN SATURATION: 97 %

## 2023-05-11 DIAGNOSIS — N18.1 CHRONIC KIDNEY DISEASE, STAGE 1: ICD-10-CM

## 2023-05-11 DIAGNOSIS — R80.9 TYPE 2 DIABETES MELLITUS WITH MICROALBUMINURIA, WITHOUT LONG-TERM CURRENT USE OF INSULIN (H): ICD-10-CM

## 2023-05-11 DIAGNOSIS — F64.9 GENDER DYSPHORIA: Primary | ICD-10-CM

## 2023-05-11 DIAGNOSIS — Z51.81 ENCOUNTER FOR THERAPEUTIC DRUG MONITORING: ICD-10-CM

## 2023-05-11 DIAGNOSIS — E11.29 TYPE 2 DIABETES MELLITUS WITH MICROALBUMINURIA, WITHOUT LONG-TERM CURRENT USE OF INSULIN (H): ICD-10-CM

## 2023-05-11 PROCEDURE — 84403 ASSAY OF TOTAL TESTOSTERONE: CPT | Mod: KX | Performed by: STUDENT IN AN ORGANIZED HEALTH CARE EDUCATION/TRAINING PROGRAM

## 2023-05-11 PROCEDURE — 36415 COLL VENOUS BLD VENIPUNCTURE: CPT | Performed by: STUDENT IN AN ORGANIZED HEALTH CARE EDUCATION/TRAINING PROGRAM

## 2023-05-11 PROCEDURE — 99214 OFFICE O/P EST MOD 30 MIN: CPT | Mod: GC | Performed by: STUDENT IN AN ORGANIZED HEALTH CARE EDUCATION/TRAINING PROGRAM

## 2023-05-11 NOTE — PROGRESS NOTES
Preceptor Attestation:   Patient seen, evaluated and discussed with the resident. I have verified the content of the note, which accurately reflects my assessment of the patient and the plan of care.   Supervising Physician:  Pedro Martinez MD

## 2023-05-11 NOTE — PROGRESS NOTES
"  Assessment & Plan     Gender dysphoria  Encounter for therapeutic drug monitoring  Reviewed lab results, E is in physiologic range. Wants to check T to be sure it is suppressed, is still deciding on finasteride or progesterone but would prefer to wait until established with a new provider. No adjustments to therapy at this time.   - Testosterone total; Future  - Testosterone total    Type 2 diabetes mellitus with microalbuminuria, without long-term current use of insulin (H)  Chronic kidney disease, stage 1  Lab check for DMII shows significant improvement in their A1c 5.8 4/27/23 from 7.5 1/26/23. Will continue with current efforts and let us know if she needs more or different support.     Situationally appropriate distress  Patient has had a traumatic year, multiple stressors. She is attending therapy and feeling safe with herself. She will consider an selective serotonin reuptake inhibitor or other medication support in the future but right now feels that she can get what she needs from therapy. She will let us know what support she may need from us.       No follow-ups on file.    Sima Gilbert MD  [facility] LOLIS Duncan is a 33 year old, presenting for the following health issues:  Results (Pt is here for lab review from 4/27/2023 )        5/11/2023    10:20 AM   Additional Questions   Roomed by Teresita Boone MA   Accompanied by Self         5/11/2023    10:20 AM   Patient Reported Additional Medications   Patient reports taking the following new medications No     HPI     Started injections on 10th of October,was on patches prior to that for about 6 months. \"Progesterone can help with breast development and hip distribution.\" Would like to learn more about progesterone. Year has been rather tremoltuous.     Is not on any androgen blocker at the moment. Is open to orchiectomy in future based on T - levels more so than dysphoria at this point, but potentially may " want to address in future. Is wanting to talk about progesterone rather than starting today. Is considering something a bit more permanent once Keysha leaves    Happy with progress overall, but it is hard to determine subtle changes. Improved skin texture is noted and appreciated, but is no longer noticing breast tenderness as frequently and thinks this might mean growth has plateaued.      Recent panic attack last week. Year has been mostly awful. Mom had medical crisis. Therapy counselling every 2 weeks, feels reasonably safe and supported om that setting and Less helpless overall. No SI, very occassional passive SI in past with severere episodes of mood symptoms.    Review of Systems   See HPI      Objective    /71 (BP Location: Left arm, Patient Position: Sitting, Cuff Size: Adult Large)   Pulse 94   Temp 98.8  F (37.1  C) (Oral)   Resp 16   SpO2 97%   There is no height or weight on file to calculate BMI.  Physical Exam   Vital signs reviewed  Constitutional: Age appropriate human well kempt, no acute distress  HENT: Sclera non-icteric and not injected, pink conjunctivae  Cardiac: Regular rate  Resp: Speaking in full sentences on room air, no respiratory distress  Skin: Warm, dry   Msk: Ambulates independently, full range of gesture  Neuro: Alert and oriented, movements coordinated and symmetric, appropriate gait  Psych: Affect appropriate to conversation and situation       Lab on 04/27/2023   Component Date Value Ref Range Status     Estradiol 04/27/2023 99  pg/mL Final    Healthy Men:   11.3-43.2 pg/mL    Healthy Postmenopausal Women:  Postmenopause: <5-138 pg/mL    Healthy Pregnant Women:  1st trimester: 154-3243 pg/mL  2nd trimester: 1561-72812 pg/mL  3rd trimester: 8525->93844 pg/mL    Healthy Women Cycle Phase:  Follicular: 30.9-90.4 pg/mL  Ovulation: 60.4-533 pg/mL  Luteal: 60.4-232 pg/mL    Healthy Women Cycle Sub-Phase:  Early Follicular: 20.5-62.8 pg/mL  Intermediate Follicular: 26-79.8  pg/mL  Late Follicular: 49.5-233 pg/mL  Ovulation: 60.4-602 pg/mL  Early Luteal: 51.1-179 pg/mL  Intermediate Luteal: 66.5-305 pg/mL  Late Luteal: 30.2-222 pg/mL     Hemoglobin A1C 04/27/2023 5.8 (H)  0.0 - 5.6 % Final    Normal <5.7%   Prediabetes 5.7-6.4%    Diabetes 6.5% or higher     Note: Adopted from ADA consensus guidelines.     Creatinine Urine mg/dL 04/27/2023 299  mg/dL Final     Albumin Urine mg/L 04/27/2023 65  mg/L Final     Albumin Urine mg/g Cr 04/27/2023 21.74  0.00 - 25.00 mg/g Cr Final    0-17 y             0-25 mg/g creatinine  18 y and older, Female 0-25 mg/g creatinine  18 y and older, Male 0-17 mg/g creatinine       Sodium 04/27/2023 136  133 - 144 mmol/L Final     Potassium 04/27/2023 4.2  3.4 - 5.3 mmol/L Final     Chloride 04/27/2023 103  94 - 109 mmol/L Final    0-20 years:       Female:  mmol/L  Male:    mmol/L       20 years and older:   Female:  mmol/L   Male:    mmol/L       Carbon Dioxide (CO2) 04/27/2023 28  20 - 32 mmol/L Final     Anion Gap 04/27/2023 5  3 - 14 mmol/L Final     Urea Nitrogen 04/27/2023 13  7 - 30 mg/dL Final    Female  0 to 15 days           3-23  15 days to 1 year      3-17  1 to 10 years          9-22  10 to 19 years         7-19  19 years and older     7-30    Male  0 to 15 days           3-23  15 days to 1 year      3-17  1 to 10 years          9-22  10 to 19 years         7-21  19 years and older     7-30     Creatinine 04/27/2023 0.85  0.52 - 1.25 mg/dL Final    20 y and older Female 0.52-1.04 mg/dL  20 y and older Male 0.66-1.25 mg/dL    Varies with the amount of muscle mass present.    GICH  Female: 0.6-1.2 mg/dL  Male: 0.7-1.3 mg/dL       Calcium 04/27/2023 9.0  8.5 - 10.1 mg/dL Final     Glucose 04/27/2023 138 (H)  70 - 99 mg/dL Final     Alkaline Phosphatase 04/27/2023 31 (L)  40 - 150 U/L Final    Female:    0-3 years  110-320 U/L  4-9 years  150-420 U/L  10-11 years  130-560 U/L  12-13 years  105-420 U/L  14-15 years    U/L  16 years and older   U/L      Male:  0-3 years  110-320 U/L  4-9 years  150-420 U/L  10-15 years  130-530 U/L  16-19 years   U/L  20 years and older   U/L       AST 04/27/2023 14  0 - 45 U/L Final     ALT 04/27/2023 19  0 - 70 U/L Final    Female    All ages 0-50 U/L    Male    0 - 19 years       0-50 U/L  20 years and older 0-70 U/L            Protein Total 04/27/2023 7.6  6.8 - 8.8 g/dL Final     Albumin 04/27/2023 3.6  3.4 - 5.0 g/dL Final     Bilirubin Total 04/27/2023 0.5  0.2 - 1.3 mg/dL Final     GFR Estimate 04/27/2023 >90  >60 mL/min/1.73m2 Final    GFR not calculated when sex unspecified or nonbinary.  eGFR calculated using 2021 CKD-EPI equation.

## 2023-05-13 LAB — TESTOST SERPL-MCNC: 33 NG/DL (ref 8–950)

## 2023-07-31 ENCOUNTER — OFFICE VISIT (OUTPATIENT)
Dept: OPHTHALMOLOGY | Facility: CLINIC | Age: 33
End: 2023-07-31
Attending: STUDENT IN AN ORGANIZED HEALTH CARE EDUCATION/TRAINING PROGRAM
Payer: COMMERCIAL

## 2023-07-31 DIAGNOSIS — H52.223 REGULAR ASTIGMATISM OF BOTH EYES: ICD-10-CM

## 2023-07-31 DIAGNOSIS — E11.29 TYPE 2 DIABETES MELLITUS WITH MICROALBUMINURIA, WITHOUT LONG-TERM CURRENT USE OF INSULIN (H): Primary | ICD-10-CM

## 2023-07-31 DIAGNOSIS — H50.52 EXOPHORIA: ICD-10-CM

## 2023-07-31 DIAGNOSIS — R80.9 TYPE 2 DIABETES MELLITUS WITH MICROALBUMINURIA, WITHOUT LONG-TERM CURRENT USE OF INSULIN (H): Primary | ICD-10-CM

## 2023-07-31 DIAGNOSIS — Z01.01 ENCOUNTER FOR EXAMINATION OF EYES AND VISION WITH ABNORMAL FINDINGS: ICD-10-CM

## 2023-07-31 PROCEDURE — 92004 COMPRE OPH EXAM NEW PT 1/>: CPT | Performed by: OPHTHALMOLOGY

## 2023-07-31 PROCEDURE — 92015 DETERMINE REFRACTIVE STATE: CPT | Performed by: OPHTHALMOLOGY

## 2023-07-31 ASSESSMENT — CONF VISUAL FIELD
OD_INFERIOR_NASAL_RESTRICTION: 0
OS_SUPERIOR_TEMPORAL_RESTRICTION: 0
OD_NORMAL: 1
OD_INFERIOR_TEMPORAL_RESTRICTION: 0
OS_INFERIOR_NASAL_RESTRICTION: 0
OS_SUPERIOR_NASAL_RESTRICTION: 0
OD_SUPERIOR_NASAL_RESTRICTION: 0
OS_NORMAL: 1
OS_INFERIOR_TEMPORAL_RESTRICTION: 0
METHOD: COUNTING FINGERS
OD_SUPERIOR_TEMPORAL_RESTRICTION: 0

## 2023-07-31 ASSESSMENT — EXTERNAL EXAM - LEFT EYE: OS_EXAM: NORMAL

## 2023-07-31 ASSESSMENT — REFRACTION_MANIFEST
OD_AXIS: 180
OD_SPHERE: -0.25
OS_AXIS: 172
OS_SPHERE: -0.50
OS_CYLINDER: +0.75
OD_CYLINDER: +0.75

## 2023-07-31 ASSESSMENT — CUP TO DISC RATIO
OS_RATIO: 0.4
OD_RATIO: 0.3

## 2023-07-31 ASSESSMENT — VISUAL ACUITY
OS_SC: 20/25
OS_SC: J1+
OD_SC: J1+
OD_SC+: -2
OS_SC+: +2
METHOD: SNELLEN - LINEAR
OD_SC: 20/20

## 2023-07-31 ASSESSMENT — TONOMETRY
OD_IOP_MMHG: 16
IOP_METHOD: APPLANATION
OS_IOP_MMHG: 18

## 2023-07-31 ASSESSMENT — SLIT LAMP EXAM - LIDS
COMMENTS: NORMAL
COMMENTS: NORMAL

## 2023-07-31 ASSESSMENT — EXTERNAL EXAM - RIGHT EYE: OD_EXAM: NORMAL

## 2023-07-31 NOTE — PATIENT INSTRUCTIONS
"Glasses prescription given - optional  Can try +1.25 over the counter glasses for reading.  May use artificial tears up to four times a day (like Refresh Optive, Systane Balance, TheraTears, or generic artificial tears are ok. Avoid \"get the red out\" drops).   Call in March 2024 for an appointment in July 2024 for Complete Exam    Dr. Mayers [phone]      Patient Education   Diabetes weakens the blood vessels all over the body, including the eyes. Damage to the blood vessels in the eyes can cause swelling or bleeding into part of the eye (called the retina). This is called diabetic retinopathy (EVELIN-tin-AH-puh-thee). If not treated, this disease can cause vision loss or blindness.   Symptoms may include blurred or distorted vision, but many people have no symptoms. It's important to see your eye doctor regularly to check for problems.   Early treatment and good control can help protect your vision. Here are the things you can do to help prevent vision loss:      1. Keep your blood sugar levels under tight control.      2. Bring high blood pressure under control.      3. No smoking.      4. Have yearly dilated eye exams.       "

## 2023-07-31 NOTE — PROGRESS NOTES
" Current Eye Medications:  none     Subjective:  diabetic, dilated exam - has rx reading glasses to use as her eyes get tired after reading periods of time. No issues with distance vision without glasses. No pain or discomfort either eyes.     Type II DM - oral med controlled.  Last blood sugar - 117 - Friday AM reading    Lab Results   Component Value Date    A1C 5.8 04/27/2023    A1C 7.5 01/26/2023    A1C 6.5 11/17/2022    A1C 6.8 06/21/2022    A1C 5.6 01/11/2022    A1C 5.2 02/04/2021    A1C 10.5 10/27/2020     Occasionally closes right eye; no libia diplopia.     Objective:  See Ophthalmology Exam.       Assessment:  Baseline eye exam in patient with diabetes.  No diabetic retinopathy.  Exophoria.      ICD-10-CM    1. Type 2 diabetes mellitus with microalbuminuria, without long-term current use of insulin (H)  E11.29 Adult Eye  Referral    R80.9       2. 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 Encounter for examination of eyes and vision with abnormal findings  Z01.01       4. Regular astigmatism of both eyes  H52.223            Plan:  Glasses prescription given - optional  Can try +1.25 over the counter glasses for reading.  May use artificial tears up to four times a day (like Refresh Optive, Systane Balance, TheraTears, or generic artificial tears are ok. Avoid \"get the red out\" drops).   Mentioned could evaluate for surgical repair if exodeviation becomes more manifest in time.  Call in March 2024 for an appointment in July 2024 for Complete Exam    Dr. Mayers [phone]         "

## 2023-07-31 NOTE — LETTER
"    7/31/2023         RE: Perla Javed  [address]  [address]  [facility] [address]        Dear Colleague,    Thank you for referring your patient, Perla Javed, to the [facility]. Please see a copy of my visit note below.     Current Eye Medications:  none     Subjective:  diabetic, dilated exam - has rx reading glasses to use as her eyes get tired after reading periods of time. No issues with distance vision without glasses. No pain or discomfort either eyes.     Type II DM - oral med controlled.  Last blood sugar - 117 - Friday AM reading    Lab Results   Component Value Date    A1C 5.8 04/27/2023    A1C 7.5 01/26/2023    A1C 6.5 11/17/2022    A1C 6.8 06/21/2022    A1C 5.6 01/11/2022    A1C 5.2 02/04/2021    A1C 10.5 10/27/2020     Occasionally closes right eye; no libia diplopia.     Objective:  See Ophthalmology Exam.       Assessment:  Baseline eye exam in patient with diabetes.  No diabetic retinopathy.  Exophoria.      ICD-10-CM    1. Type 2 diabetes mellitus with microalbuminuria, without long-term current use of insulin (H)  E11.29 Adult Eye  Referral    R80.9       2. 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 Encounter for examination of eyes and vision with abnormal findings  Z01.01       4. Regular astigmatism of both eyes  H52.223            Plan:  Glasses prescription given - optional  Can try +1.25 over the counter glasses for reading.  May use artificial tears up to four times a day (like Refresh Optive, Systane Balance, TheraTears, or generic artificial tears are ok. Avoid \"get the red out\" drops).   Mentioned could evaluate for surgical repair if exodeviation becomes more manifest in time.  Call in March 2024 for an appointment in July 2024 for Complete Exam    Dr. Mayers [phone]           Again, thank you for allowing me to participate in the care of your patient.        Sincerely,        Christopher Mayers MD  "

## 2023-08-19 ENCOUNTER — HEALTH MAINTENANCE LETTER (OUTPATIENT)
Age: 33
End: 2023-08-19

## 2023-10-11 ENCOUNTER — NURSE TRIAGE (OUTPATIENT)
Dept: NURSING | Facility: CLINIC | Age: 33
End: 2023-10-11
Payer: COMMERCIAL

## 2023-10-11 NOTE — TELEPHONE ENCOUNTER
Patient reporting lower back, mostly on the right side.  He says he has had this pain on and off since Thanksgiving of 2020 when he thinks he may have pulled a muscle, and is mostly controlled with Ibuprofen, but has started a new job.  Denies recent injury.    Disposition is to see provider within three days.  Patient verbalizes understanding, but is at work and had to end the call before confirming his plan.  Will call back if he has any more needs.    Betty Bergman RN  [facility]         Reason for Disposition   MODERATE back pain (e.g., interferes with normal activities) and present > 3 days    Additional Information   Negative: Passed out (i.e., fainted, collapsed and was not responding)   Negative: Shock suspected (e.g., cold/pale/clammy skin, too weak to stand, low BP, rapid pulse)   Negative: Sounds like a life-threatening emergency to the triager   Negative: SEVERE back pain of sudden onset and age > 60 years   Negative: SEVERE abdominal pain (e.g., excruciating)   Negative: Abdominal pain and age > 60 years   Negative: Unable to urinate (or only a few drops) and bladder feels very full   Negative: Loss of bladder or bowel control (urine or bowel incontinence; wetting self, leaking stool) of new-onset   Negative: Numbness (loss of sensation) in groin or rectal area   Negative: Pain radiates into groin, scrotum   Negative: Blood in urine (red, pink, or tea-colored)   Negative: Vomiting and pain over lower ribs of back (i.e., flank - kidney area)   Negative: Weakness of a leg or foot (e.g., unable to bear weight, dragging foot)   Negative: Patient sounds very sick or weak to the triager   Negative: Fever > 100.4 F (38.0 C) and flank pain   Negative: Pain or burning with passing urine (urination)   Negative: SEVERE back pain (e.g., excruciating, unable to do any normal activities) and not improved after pain medicine and CARE ADVICE   Negative: Numbness in an arm or hand (i.e., loss of  sensation) and upper back pain   Negative: Numbness in a leg or foot (i.e., loss of sensation)   Negative: High-risk adult (e.g., history of cancer, history of HIV, or history of IV Drug Use)   Negative: Soft tissue infection (e.g., abscess, cellulitis) or other serious infection (e.g., bacteremia) in last 2 weeks   Negative: Painful rash with multiple small blisters grouped together (i.e., dermatomal distribution or 'band' or 'stripe')   Negative: Pain radiates into the thigh or further down the leg, and in both legs   Negative: Age > 50 and no history of prior similar back pain    Protocols used: Back Pain-A-[place]

## 2023-11-27 DIAGNOSIS — F64.9 GENDER DYSPHORIA: ICD-10-CM

## 2023-11-27 NOTE — TELEPHONE ENCOUNTER
"Last seen 5/11/23 by Dr. Chopra    Request for medication refill:  Needle, Disp, (BD DISP NEEDLES) 25G X 5/8\" MISC     Providers if patient needs an appointment and you are willing to give a one month supply please refill for one month and  send a letter/MyChart using \".SMILLIMITEDREFILL\" .smillimited and route chart to \"P [facility] \" (Giving one month refill in non controlled medications is strongly recommended before denial)    If refill has been denied, meaning absolutely no refills without visit, please complete the smart phrase \".smirxrefuse\" and route it to the \"P [facility] MED REFILLS\"  pool to inform the patient and the pharmacy.    Carolyn Robert RN      "

## 2023-11-30 RX ORDER — NEEDLES, DISPOSABLE 25GX5/8"
NEEDLE, DISPOSABLE MISCELLANEOUS
Qty: 100 EACH | Refills: 3 | Status: SHIPPED | OUTPATIENT
Start: 2023-11-30

## 2023-12-01 DIAGNOSIS — F64.9 GENDER DYSPHORIA: ICD-10-CM

## 2023-12-01 RX ORDER — SYRINGE, DISPOSABLE, 1 ML
SYRINGE, EMPTY DISPOSABLE MISCELLANEOUS
Qty: 60 EACH | Refills: 3 | Status: SHIPPED | OUTPATIENT
Start: 2023-12-01

## 2023-12-01 NOTE — TELEPHONE ENCOUNTER
"Request for medication refill:  needle, disp, (B-D DISP NEEDLE TW) 18G X 1\" MISC     syringe, disposable, (B-D SYRINGE LUER-GRACIE) 1 ML MISC     Providers if patient needs an appointment and you are willing to give a one month supply please refill for one month and  send a letter/MyChart using \".SMILLIMITEDREFILL\" .smillimited and route chart to \"P [facility] \" (Giving one month refill in non controlled medications is strongly recommended before denial)    If refill has been denied, meaning absolutely no refills without visit, please complete the smart phrase \".smirxrefuse\" and route it to the \"P [facility] MED REFILLS\"  pool to inform the patient and the pharmacy.    Cristin Lowry, CMA      "

## 2023-12-25 DIAGNOSIS — F64.9 GENDER DYSPHORIA: ICD-10-CM

## 2023-12-28 ENCOUNTER — IMMUNIZATION (OUTPATIENT)
Dept: FAMILY MEDICINE | Facility: CLINIC | Age: 33
End: 2023-12-28
Payer: COMMERCIAL

## 2023-12-28 DIAGNOSIS — Z23 NEED FOR PROPHYLACTIC VACCINATION AND INOCULATION AGAINST INFLUENZA: Primary | ICD-10-CM

## 2023-12-28 DIAGNOSIS — Z23 HIGH PRIORITY FOR 2019-NCOV VACCINE: ICD-10-CM

## 2023-12-28 PROCEDURE — 90471 IMMUNIZATION ADMIN: CPT

## 2023-12-28 PROCEDURE — 90480 ADMN SARSCOV2 VAC 1/ONLY CMP: CPT

## 2023-12-28 PROCEDURE — 90686 IIV4 VACC NO PRSV 0.5 ML IM: CPT

## 2023-12-28 PROCEDURE — 91320 SARSCV2 VAC 30MCG TRS-SUC IM: CPT

## 2023-12-29 RX ORDER — ESTRADIOL VALERATE 20 MG/ML
INJECTION INTRAMUSCULAR
Qty: 5 ML | Refills: 6 | Status: SHIPPED | OUTPATIENT
Start: 2023-12-29 | End: 2024-04-19

## 2024-01-06 ENCOUNTER — HEALTH MAINTENANCE LETTER (OUTPATIENT)
Age: 34
End: 2024-01-06

## 2024-03-08 DIAGNOSIS — E11.29 TYPE 2 DIABETES MELLITUS WITH MICROALBUMINURIA, WITHOUT LONG-TERM CURRENT USE OF INSULIN (H): ICD-10-CM

## 2024-03-08 DIAGNOSIS — Z79.4 TYPE 2 DIABETES MELLITUS WITH MICROALBUMINURIA, WITH LONG-TERM CURRENT USE OF INSULIN (H): ICD-10-CM

## 2024-03-08 DIAGNOSIS — E11.29 TYPE 2 DIABETES MELLITUS WITH MICROALBUMINURIA, WITH LONG-TERM CURRENT USE OF INSULIN (H): ICD-10-CM

## 2024-03-08 DIAGNOSIS — R80.9 TYPE 2 DIABETES MELLITUS WITH MICROALBUMINURIA, WITHOUT LONG-TERM CURRENT USE OF INSULIN (H): ICD-10-CM

## 2024-03-08 DIAGNOSIS — R80.9 TYPE 2 DIABETES MELLITUS WITH MICROALBUMINURIA, WITH LONG-TERM CURRENT USE OF INSULIN (H): ICD-10-CM

## 2024-03-08 DIAGNOSIS — E78.1 HYPERTRIGLYCERIDEMIA: ICD-10-CM

## 2024-03-08 DIAGNOSIS — I10 ESSENTIAL HYPERTENSION: ICD-10-CM

## 2024-03-11 RX ORDER — METFORMIN HCL 500 MG
1000 TABLET, EXTENDED RELEASE 24 HR ORAL 2 TIMES DAILY
Qty: 360 TABLET | Refills: 3 | Status: SHIPPED | OUTPATIENT
Start: 2024-03-11 | End: 2024-04-19

## 2024-03-11 RX ORDER — BLOOD SUGAR DIAGNOSTIC
STRIP MISCELLANEOUS
Qty: 300 STRIP | Refills: 4 | Status: SHIPPED | OUTPATIENT
Start: 2024-03-11 | End: 2024-04-22

## 2024-03-11 RX ORDER — LANCETS
EACH MISCELLANEOUS
Qty: 100 EACH | Refills: 11 | Status: SHIPPED | OUTPATIENT
Start: 2024-03-11 | End: 2024-04-22

## 2024-03-11 RX ORDER — LISINOPRIL 10 MG/1
10 TABLET ORAL DAILY
Qty: 90 TABLET | Refills: 3 | Status: SHIPPED | OUTPATIENT
Start: 2024-03-11 | End: 2024-04-19

## 2024-03-11 RX ORDER — FENOFIBRATE 200 MG/1
200 CAPSULE ORAL
Qty: 90 CAPSULE | Refills: 3 | Status: SHIPPED | OUTPATIENT
Start: 2024-03-11 | End: 2024-04-19

## 2024-03-11 NOTE — TELEPHONE ENCOUNTER
"Request for medication refill:  fenofibrate micronized (LOFIBRA) 200 MG capsule     lisinopril (ZESTRIL) 10 MG tablet     metFORMIN (GLUCOPHAGE XR) 500 MG 24 hr tablet     Providers if patient needs an appointment and you are willing to give a one month supply please refill for one month and  send a letter/MyChart using \".SMILLIMITEDREFILL\" .smillimited and route chart to \"P [facility] \" (Giving one month refill in non controlled medications is strongly recommended before denial)    If refill has been denied, meaning absolutely no refills without visit, please complete the smart phrase \".smirxrefuse\" and route it to the \"P [facility] MED REFILLS\"  pool to inform the patient and the pharmacy.    Cristin Lowry, CMA      "

## 2024-03-11 NOTE — TELEPHONE ENCOUNTER
"Request for medication refill:  blood glucose (NO BRAND SPECIFIED) test strip     blood glucose monitoring (SOFTCLIX) lancets     Providers if patient needs an appointment and you are willing to give a one month supply please refill for one month and  send a letter/MyChart using \".SMILLIMITEDREFILL\" .smillimited and route chart to \"P [facility] \" (Giving one month refill in non controlled medications is strongly recommended before denial)    If refill has been denied, meaning absolutely no refills without visit, please complete the smart phrase \".smirxrefuse\" and route it to the \"P [facility] MED REFILLS\"  pool to inform the patient and the pharmacy.    Cristin Lowry, CMA      "

## 2024-03-16 ENCOUNTER — HEALTH MAINTENANCE LETTER (OUTPATIENT)
Age: 34
End: 2024-03-16

## 2024-04-19 ENCOUNTER — MYC MEDICAL ADVICE (OUTPATIENT)
Dept: FAMILY MEDICINE | Facility: CLINIC | Age: 34
End: 2024-04-19
Payer: COMMERCIAL

## 2024-04-19 DIAGNOSIS — E78.1 HYPERTRIGLYCERIDEMIA: ICD-10-CM

## 2024-04-19 DIAGNOSIS — I10 ESSENTIAL HYPERTENSION: ICD-10-CM

## 2024-04-19 DIAGNOSIS — F64.9 GENDER DYSPHORIA: ICD-10-CM

## 2024-04-19 DIAGNOSIS — E11.29 TYPE 2 DIABETES MELLITUS WITH MICROALBUMINURIA, WITHOUT LONG-TERM CURRENT USE OF INSULIN (H): ICD-10-CM

## 2024-04-19 DIAGNOSIS — R80.9 TYPE 2 DIABETES MELLITUS WITH MICROALBUMINURIA, WITHOUT LONG-TERM CURRENT USE OF INSULIN (H): ICD-10-CM

## 2024-04-19 RX ORDER — LISINOPRIL 10 MG/1
10 TABLET ORAL DAILY
Qty: 90 TABLET | Refills: 3 | Status: SHIPPED | OUTPATIENT
Start: 2024-04-19

## 2024-04-19 RX ORDER — ESTRADIOL VALERATE 20 MG/ML
INJECTION INTRAMUSCULAR WEEKLY
Qty: 5 ML | Refills: 0 | Status: SHIPPED | OUTPATIENT
Start: 2024-04-19 | End: 2024-04-23

## 2024-04-19 RX ORDER — FENOFIBRATE 200 MG/1
200 CAPSULE ORAL
Qty: 90 CAPSULE | Refills: 3 | Status: SHIPPED | OUTPATIENT
Start: 2024-04-19

## 2024-04-19 RX ORDER — METFORMIN HCL 500 MG
1000 TABLET, EXTENDED RELEASE 24 HR ORAL 2 TIMES DAILY
Qty: 360 TABLET | Refills: 3 | Status: SHIPPED | OUTPATIENT
Start: 2024-04-19

## 2024-04-19 NOTE — TELEPHONE ENCOUNTER
"Patient asking for 90 day fill for meds, transferred to [facility]    Request for medication refill:    Providers if patient needs an appointment and you are willing to give a one month supply please refill for one month and  send a letter/MyChart using \".SMILLIMITEDREFILL\" .smillimited and route chart to \"P [facility] \" (Giving one month refill in non controlled medications is strongly recommended before denial)    If refill has been denied, meaning absolutely no refills without visit, please complete the smart phrase \".smirxrefuse\" and route it to the \"P [facility] MED REFILLS\"  pool to inform the patient and the pharmacy.    Carolyn Robert RN      "

## 2024-04-22 DIAGNOSIS — E11.29 TYPE 2 DIABETES MELLITUS WITH MICROALBUMINURIA, WITH LONG-TERM CURRENT USE OF INSULIN (H): ICD-10-CM

## 2024-04-22 DIAGNOSIS — E11.29 TYPE 2 DIABETES MELLITUS WITH MICROALBUMINURIA, WITHOUT LONG-TERM CURRENT USE OF INSULIN (H): ICD-10-CM

## 2024-04-22 DIAGNOSIS — Z79.4 TYPE 2 DIABETES MELLITUS WITH MICROALBUMINURIA, WITH LONG-TERM CURRENT USE OF INSULIN (H): ICD-10-CM

## 2024-04-22 DIAGNOSIS — R80.9 TYPE 2 DIABETES MELLITUS WITH MICROALBUMINURIA, WITHOUT LONG-TERM CURRENT USE OF INSULIN (H): ICD-10-CM

## 2024-04-22 DIAGNOSIS — R80.9 TYPE 2 DIABETES MELLITUS WITH MICROALBUMINURIA, WITH LONG-TERM CURRENT USE OF INSULIN (H): ICD-10-CM

## 2024-04-22 RX ORDER — LANCETS
EACH MISCELLANEOUS
Qty: 100 EACH | Refills: 11 | Status: SHIPPED | OUTPATIENT
Start: 2024-04-22

## 2024-04-22 RX ORDER — BLOOD SUGAR DIAGNOSTIC
STRIP MISCELLANEOUS
Qty: 300 STRIP | Refills: 4 | Status: SHIPPED | OUTPATIENT
Start: 2024-04-22

## 2024-04-22 NOTE — TELEPHONE ENCOUNTER
"Request for medication refill:ACCU-CHEK GUIDE test strip     blood glucose monitoring (SOFTCLIX) lancets       Providers if patient needs an appointment and you are willing to give a one month supply please refill for one month and  send a letter/MyChart using \".SMILLIMITEDREFILL\" .smillimited and route chart to \"P [facility] \" (Giving one month refill in non controlled medications is strongly recommended before denial)    If refill has been denied, meaning absolutely no refills without visit, please complete the smart phrase \".smirxrefuse\" and route it to the \"P [facility] MED REFILLS\"  pool to inform the patient and the pharmacy.    Emmie Mascorro MA      "

## 2024-04-23 DIAGNOSIS — F64.9 GENDER DYSPHORIA: ICD-10-CM

## 2024-04-23 RX ORDER — ESTRADIOL VALERATE 20 MG/ML
INJECTION INTRAMUSCULAR
Qty: 5 ML | Refills: 0 | Status: SHIPPED | OUTPATIENT
Start: 2024-04-23 | End: 2024-04-30

## 2024-04-26 DIAGNOSIS — F64.9 GENDER DYSPHORIA: ICD-10-CM

## 2024-04-26 NOTE — TELEPHONE ENCOUNTER
"Request for medication refill:estradiol valerate (DELESTROGEN) 20 MG/ML injection     Please clarify the ROUTE OF ADMINISTRATION for the prescription. This medication is generally administered INTRAMUSCULARLY. Thank you.    Providers if patient needs an appointment and you are willing to give a one month supply please refill for one month and  send a letter/MyChart using \".SMILLIMITEDREFILL\" .smillimited and route chart to \"P [facility] \" (Giving one month refill in non controlled medications is strongly recommended before denial)    If refill has been denied, meaning absolutely no refills without visit, please complete the smart phrase \".smirxrefuse\" and route it to the \"P [facility] MED REFILLS\"  pool to inform the patient and the pharmacy.    Emmie Mascorro MA      "

## 2024-04-29 RX ORDER — ESTRADIOL VALERATE 20 MG/ML
INJECTION INTRAMUSCULAR
Qty: 5 ML | Refills: 0 | OUTPATIENT
Start: 2024-04-29

## 2024-04-29 NOTE — TELEPHONE ENCOUNTER
Called and spoke with pharmacist, they needed clarification on dosage and route which I gave, they said they will process the order.    Carolyn Robert RN

## 2024-04-30 RX ORDER — ESTRADIOL VALERATE 20 MG/ML
INJECTION INTRAMUSCULAR
Qty: 15 ML | Refills: 1 | Status: SHIPPED | OUTPATIENT
Start: 2024-04-30

## 2024-04-30 RX ORDER — ESTRADIOL VALERATE 20 MG/ML
INJECTION INTRAMUSCULAR
Qty: 5 ML | Refills: 3 | Status: SHIPPED | OUTPATIENT
Start: 2024-04-30 | End: 2024-04-30

## 2024-05-06 NOTE — PROCEDURES
Laser Hair Removal Gentle Max Prox (755/1064nm) 755nm  Procedure Date: Apr 15, 2022    Staff Surgeon: Dr Mccullough    Resident Surgeon: andria    Assistant: Helena Ferguson RN      Trimming required prior to treatment in clinic?: yes and shaved this morning  Barba skin type: II  Diagnosis/Treatment Reason: gender dysphoria    Treatment#: 4    Test Area Settings:   Wavelength(755/1064nm): 755nm  Location: left cheek  Fluence: 26J   Spot size: 15mm  Pulse width:  3 mS ( mS)   Total Number of Pulses: 2  Dynamic cooling spray settin mS  Dynamic cooling device delay:  40 mS    Laser Settings:  Wavelength(755/1064nm): 755nm  Location: face/neck  Fluence: 26J   Spot size: 15mm  Pulse width:  3 mS ( mS)   Total Number of Pulses: 136  Dynamic cooling spray settin mS  Dynamic cooling device delay:  40 mS        Anesthesia:  The following medication was applied to skin:    MEDICATION: Benzocaine 20%/Lidocaine 6%/Tetracaine 4%  ROUTE: Topical   SITE: face/neck  DOSE: 5 grams  LOT #: [id number]  : Alessandra  EXPIRATION DATE: 2022  Was there drug waste? No  Multi-dose vial: Yes      Javier used?:No  Ice used?: Yes    Description of Operation/Procedure:   for return laser hair removal patients.The risks were again reviewed including skin hyperpigmentation, hypopigmentation, scarring, bruising, and blistering. Reviewed white and red hair do not respond. Reviewed hair may regrow as it is not permanent but a reduction in hair growth.  Reviewed that a 3 months waiting period prior to surgery to recommended. Reviewed that the patient can terminate the procedure at anytime.         A photo and operative consent were obtained. Time-out was performed.  The patient was positioned to optimally expose the area treated. Dynamic cooling was verified and functioning properly.  Protective eyewear was worn by the patient and goggles on all personnel in the treatment room.  The patient confirmed the site to be  [Father] : father treated. The laser energy output was verified by meter reading.  N95 and buffalo filtration was used.     The clinically evident lesion(s) was/were treated with laser beam as above with 1 pass.  The patient tolerated the procedure well and no complications were noted. Post operative instructions were provided. The total laser operation and preparation time was 10 minutes.  The patient was counseled to contact us immediately for any concerns. The patient was offered and recommended to read their after visit summary.     Numeric pain scale after treatment: 8/10 on lip and chin    The patient will follow-up in 6-8 weeks and is already scheduled        Dr. Mccullough staffed the patient was present for identifying and marking target area(s),, ascertainment of protective equipment utilization,, calibration of laser, and treatment of 1 area(s),    Staff Involved:  Staff Only  Helena Ferguson RN  I was present for the test areas. Based on post op photos will also drop the neck settings at follow up to 24mJ. I called patient and added lidex. Patient will ice. Pt reports no swelling and patch red areas. No open areas and appears the same as it has in the past.   Call clinic for any blisters    Sera Mccullough MD    Department of Dermatology  [facility]: Phone: [phone], Fax:[phone]  [facility]: Phone: [phone], Fax: [phone]

## 2024-05-25 ENCOUNTER — HEALTH MAINTENANCE LETTER (OUTPATIENT)
Age: 34
End: 2024-05-25

## 2024-06-03 ENCOUNTER — DOCUMENTATION ONLY (OUTPATIENT)
Dept: FAMILY MEDICINE | Facility: CLINIC | Age: 34
End: 2024-06-03
Payer: COMMERCIAL

## 2024-06-03 NOTE — PROGRESS NOTES
Unsure of where form originated, it was found signed in the completed folder. Form will be faxed out per usual.       Form has been completed by provider.     Form sent out via: Fax to [employer] at Fax Number: [phone]  Patient informed: N/A  Output date: Mellissa 3, 2024    Monet Kaye MA      **Please close the encounter**

## 2024-10-12 ENCOUNTER — HEALTH MAINTENANCE LETTER (OUTPATIENT)
Age: 34
End: 2024-10-12

## 2025-01-25 ENCOUNTER — HEALTH MAINTENANCE LETTER (OUTPATIENT)
Age: 35
End: 2025-01-25

## 2025-01-27 DIAGNOSIS — F64.9 GENDER DYSPHORIA: ICD-10-CM

## 2025-01-27 RX ORDER — SYRINGE, DISPOSABLE, 1 ML
SYRINGE, EMPTY DISPOSABLE MISCELLANEOUS
Qty: 60 EACH | Refills: 3 | Status: SHIPPED | OUTPATIENT
Start: 2025-01-27

## 2025-01-27 NOTE — TELEPHONE ENCOUNTER
"Request for medication refill: syringe, disposable, (B-D SYRINGE LUER-GRACIE) 1 ML MISC     Providers if patient needs an appointment and you are willing to give a one month supply please refill for one month and  send a letter/MyChart using \".SMILLIMITEDREFILL\" .smillimited and route chart to \"P [facility] \" (Giving one month refill in non controlled medications is strongly recommended before denial)    If refill has been denied, meaning absolutely no refills without visit, please complete the smart phrase \".smirxrefuse\" and route it to the \"P [facility] MED REFILLS\"  pool to inform the patient and the pharmacy.    Delores Wraner, CMA    "

## 2025-02-10 NOTE — PROGRESS NOTES
You will be scheduled for Pacemaker implant with .  You will receive a call from Dr Adams's office located at [facility] to schedule your procedure.  Their contact number is [phone].    Please see below for important instructions regarding your procedure     Pre Procedure Instructions:    ~   HOLD ALL A.M. MEDICATIONS the day of your procedure unless otherwise instructed.  Take your carbidopa-levodopa     ~  Please bring a list of your medications with you to the hospital.    ~   DO NOT EAT OR DRINK ANYTHING after midnight the night before your procedure unless otherwise instructed. If your procedure is schedule later in the day, no solid food for 8 hours prior to procedure.  You may have clear liquids up until 4 hours prior to your procedure, avoid liquids red in color.     ~  You will need to have non-fasting blood work prior to the procedure. Labs must be done within 1 week prior to your procedure at the [place] lab of your choice.  You will need to contact lab of choice to schedule appointment.  They no longer accept walk ins.      ~  Your Primary Care Physician (PCP) has been notified of your upcoming   procedure and must provide a preoperative visit with you for a history and physical within 30 days prior to your procedure. It is recommended that you call your PCP to schedule this visit AFTER your procedure date has been determined.    ~ Depending on your procedure, prepare for an overnight stay     ~ You will need a  at discharge, unless otherwise stated.     ~ Your follow up with Dr Adams will be scheduled at discharge           Before Surgery Skin Preparation for Pacemaker(and Leadless)/Defibrillator Implant/Generator Change/Loop Recorder Implant    If able, shower the night before surgery            1) Shower as you normally would,using your regular soaps and shampoo's rinse fully.    2) When finished bathing, use the provided Chlorhexidine (Hibiclens)on a clean freshly laundered washcloth.  Gender Support Clinic Visit Note         HPI       Ghazala Peña is a 30 year old individual that uses pronouns she/her or they/ them pronouns and is here to discuss feminizing hormone therapy.     Gender identity  Gender Identity: nonbinary transwoman, much more on female of the spectrum, grown to understand and accept gender identity as spectrum  Sex Assigned at Birth  male      Gender journey: 2018 when friend of hers first asked if she thought she'd be trans but then started looking into it and thought related to way too much of it. Has been realizing that for long time has gone undiagnosed and discovered. Came out last year. Took it slowly. Came out to friend at first. Stopped hiding completely 1 year ago.   Support network for gender identity: friends (few that are really close), a few friends that are trans themselves, therapist Rose Keller at [facility] and [facility]. Out to mom-- went pretty good. Hasn't spoken to dad in close to 9 years.     Anatomic gender dysphoria:   - facial hair (extremely)-- has been picking hair on face without knowing it  - body hair in general  - proportions- broad shoulders  - facial  - presentational qualities  - comfortable in androgyny  - not a lot of genital dysphoria  - like have breasts, bigger so has a little bit    Previous medical care related to gender affirmation:   Already taking medications related to gender dysphoria (ie: hormones): no  Surgical interventions desired: FFS, hairline does cause some dysphoria, hopefully trying to be as minimal surgery as possible. Hasn't thought too much into GRS.     Housing: Live with mom and roommate. Going great.    Mental Health Assessment:  PHQ-9 SCORE 10/27/2020   PHQ-9 Total Score 15      AMPARO-7 SCORE 10/27/2020   Total Score 13      Working with therapist for about 2 years now. Ups and downs. A lot better. Much better since living true self. No more anxiety attacks.   Active symptoms: racing thoughts, social withdrawal,   Wash your chest, from under your chin down to your waist including both sides of your underarms down to waist.   3) Leave the Chlorhexidine (Hibiclens) on your body for 1 minute.   4) Rinse your body thoroughly with warm water.    5) Use a clean fresh towel to dry off.             If able, shower morning of surgery             1. Repeat steps above.   2. Do not apply lotions, deodorants, perfumes, powder or makeup to your body. Avoid wearing face makeup.   3. Do not apply any oils, mousse, gel, or hair products to your hair. Remove hair pins, clips, ties.   4. Avoid wearing nail polish.         It is best to do skin prep in the shower, but a sponge bath can be completed if you are unable to shower.  Use clean freshly laundered washcloths and towels.      ~  A prescription for Mupirocin ointment has been ordered and sent to your preferred pharmacy. You are to use it twice daily starting 3 days prior to admission. Squirt a small amount on a Q-Tip and wipe it inside one side of your nose and then use another clean Q-Tip and repeat for the other nostril.       AFTER VISIT PLAN AND HEALTH GOALS:  Proceed with pacemaker implant  Follow up 2 weeks post implant for site check    Follow heart healthy diet.  Stay active. We recommend 150 minutes of exercise per week.  Avoid caffeine and alcohol.    If questions should arise after you leave, please do not hesitate to call the office.  Our office hours are:  Monday through Friday, 8:00 AM - 4:30 PM  Office phone number is [phone]      If you receive a survey in your e-mail or by text, we ask that you please fill it out. By providing us with answers that are  good or bad it helps us know how we are doing and what we can improve on.    Thank you for choosing [facility] for your health care needs!  The Electrophysiology Team for Dr. Anca Licona and Dr. Ahsan Adams     distractibility, irritability/anger, social discomfort, fatigue, sadness/feeling down, panic attacks, forgetfulness, hopelessness, low self worth, lack of motivation, nightmares   Hx of self harm and suicidal thoughts: Not very often, have been very sparse. Does have aspects of self harm when in deep depression. Wish didn't exist. No cutting. Might do things not good for health. Starving self for a day or two. Not taking care of self. Has not struggled with this recently.   Hx of suicide attempts/ hospitalizations for mental health: No    [facility]  Chemical use history: no tobacco use. Alcohol use sparse 1-2x/year. Marijuana use been a number of years. No other substances.   Psych dx history: anxiety, MDD severe, gender dysphoria.   Medications prescribed for above and by whom? None, open to the idea if need be.     Sexual health and relationships:  Current:    On and off  Contraception needed? No, no penetrative sex. Condoms use every time.     Fertility:  Future fertility goals: no interest in having children  Interested in sperm/ovary cryopreservation? no    Medical history: seasonal allergies, sinus infections  Surgical history: wisdom teeth removed 5 years ago, no other significant surgeries  Family history: mom with HTN and type II diabetes.  Diabetes runs in family.  Father on mental health medication.   No breast or ovarian cancer    Past History   Past Surgical History:   Procedure Laterality Date     TONSILLECTOMY, ADENOIDECTOMY, MYRINGOTOMY, INSERT TUBE BILATERAL, COMBINED      age 5     Patient Active Problem List   Diagnosis     CARDIOVASCULAR SCREENING; LDL GOAL LESS THAN 160     Past Medical History:   Diagnosis Date     Environmental allergies     flare-up in fall     Current Outpatient Medications   Medication Sig Dispense Refill     Albuterol Sulfate (PROAIR HFA) 108 (90 BASE) MCG/ACT AERS Inhale 2 puffs into the lungs every  4 hours as needed. 1 Inhaler 1     BENADRYL 25 MG OR TABS 1 TABLET EVERY 4 TO 6 HOURS AS NEEDED       NO ACTIVE MEDICATIONS .       No family history on file.  Allergies   Allergen Reactions     Ceclor [Cefaclor]      Social History     Socioeconomic History     Marital status: Single     Spouse name: Not on file     Number of children: Not on file     Years of education: Not on file     Highest education level: Not on file   Occupational History     Not on file   Social Needs     Financial resource strain: Not on file     Food insecurity     Worry: Not on file     Inability: Not on file     Transportation needs     Medical: Not on file     Non-medical: Not on file   Tobacco Use     Smoking status: Never Smoker   Substance and Sexual Activity     Alcohol use: No     Drug use: No     Sexual activity: Not on file   Lifestyle     Physical activity     Days per week: Not on file     Minutes per session: Not on file     Stress: Not on file   Relationships     Social connections     Talks on phone: Not on file     Gets together: Not on file     Attends [employer] service: Not on file     Active member of club or organization: Not on file     Attends meetings of clubs or organizations: Not on file     Relationship status: Not on file     Intimate partner violence     Fear of current or ex partner: Not on file     Emotionally abused: Not on file     Physically abused: Not on file     Forced sexual activity: Not on file   Other Topics Concern     Parent/sibling w/ CABG, MI or angioplasty before 65F 55M? Not Asked   Social History Narrative     Not on file            Review of Systems:        CONSTITUTIONAL: NEGATIVE for fever, chills, change in weight  INTEGUMENTARY/SKIN: NEGATIVE for worrisome rashes, moles or lesions  EYES: NEGATIVE for vision changes or irritation  ENT/MOUTH: + allergies  RESP: NEGATIVE for significant cough or SOB  BREAST: NEGATIVE for masses, tenderness or discharge  CV: NEGATIVE for chest pain,  "palpitations or peripheral edema  GI: NEGATIVE for nausea, abdominal pain, heartburn, or change in bowel habits  : NEGATIVE for frequency, dysuria, or hematuria  MUSCULOSKELETAL: NEGATIVE for significant arthralgias or myalgia  NEURO: NEGATIVE for weakness, dizziness or paresthesias  ENDOCRINE: + run hot  HEME/ALLERGY: NEGATIVE for bleeding problems  PSYCHIATRIC: NEGATIVE for changes in mood or affect  Sleep: normal           Physical Exam:     Vitals:    10/27/20 1055 10/27/20 1100   BP: (!) 162/100 (!) 154/106   Pulse: 121    Resp: 20    Temp: 99.2  F (37.3  C)    TempSrc: Oral    SpO2: 98%    Weight: 145.7 kg (321 lb 3.2 oz)    Height: 1.735 m (5' 8.31\")      BMI= There is no height or weight on file to calculate BMI.   Wt Readings from Last 10 Encounters:   11/22/10 127 kg (280 lb)   12/28/09 125.2 kg (276 lb) (>99 %, Z= 2.78)*     * Growth percentiles are based on CDC (Boys, 2-20 Years) data.     GENERAL: healthy, alert and no distress  HEENT: normal conjunctiva, sclera anicteric  NECK: no adenopathy, no asymmetry, no masses  CARDIAC: normal rate, regular rhythm, no murmur  RESP: lungs clear to auscultation - no rales, no rhonchi, no wheezes  ABDOMEN: soft, no tenderness, no  hepatosplenomegaly, no masses, obese  MS: extremities normal- no gross deformities noted, no edema  Neuro: normal reflexes and symmetric  Psych: mood depressed, affect appropriate, normal speech, normal insight, normal judgement         Labs:     No results found for any previous visit.     Assessment and Plan     Ghazala was seen today for new patient.    Gender dysphoria  Patient meets criteria for gender dysphoria. Stable mental health and follows with therapist. Discussed fertility and patient does not have interest in sperm cryopreservation. Contraception not needed. Plan to get labs today including A1C given family history of diabetes. Feminizing informed consent given to patient today to review prior to next visit. Next visit plan " "to discuss feminizing hormone consent and types/dosing of feminizing hormone therapy. Referral placed for laser hair removal.    -     COMPREHENSIVE GENDER CARE REFERRAL - INTERNAL  -     Hemoglobin A1c  -     Comprehensive metabolic panel  -     CBC with platelets differential  -     Lipid panel reflex to direct LDL Non-fasting    Elevated blood pressure  Patient also very nervous at visit today. Will repeat BP at next visit and if still elevated, recommend starting on BP medication.     Follow up: 1-2 weeks     Today s visit included assessment of interventions to alleviate symptoms related to gender dysphoria or gender nonconformity, including psychological support, medical treatment, and options for social support or changes in gender expression. Today s visit also assessed this individual's suicide risk, as transgender patients are at higher risk of suicide, gender expression, gender identity and how well consolidated in that identity, presence or absence of gender dysphoria versus gender non-conformity, and assessment and diagnosis of coexisting mental health concerns.This assessment is based on the 2011 published Standards of Care for the Health of Transsexual, Transgender, and Gender-Nonconforming People, Version 7, by the [employer] of Transgender Health.    I spent 45 min face to face with the patient and >50% was spent counselling the patient about the above medical conditions, educating patient on their medical conditions, behavioral interventions and supports.    Cole \"Franca\"DO Asher  Pager: [phone]            "

## 2025-03-22 ENCOUNTER — HEALTH MAINTENANCE LETTER (OUTPATIENT)
Age: 35
End: 2025-03-22

## 2025-05-03 ENCOUNTER — HEALTH MAINTENANCE LETTER (OUTPATIENT)
Age: 35
End: 2025-05-03

## 2025-08-16 ENCOUNTER — HEALTH MAINTENANCE LETTER (OUTPATIENT)
Age: 35
End: 2025-08-16